# Patient Record
Sex: MALE | Race: WHITE | NOT HISPANIC OR LATINO | URBAN - NONMETROPOLITAN AREA
[De-identification: names, ages, dates, MRNs, and addresses within clinical notes are randomized per-mention and may not be internally consistent; named-entity substitution may affect disease eponyms.]

---

## 2019-11-15 ENCOUNTER — APPOINTMENT (RX ONLY)
Dept: URBAN - NONMETROPOLITAN AREA CLINIC 31 | Facility: CLINIC | Age: 74
Setting detail: DERMATOLOGY
End: 2019-11-15

## 2019-11-15 DIAGNOSIS — I87.2 VENOUS INSUFFICIENCY (CHRONIC) (PERIPHERAL): ICD-10-CM

## 2019-11-15 DIAGNOSIS — L57.0 ACTINIC KERATOSIS: ICD-10-CM

## 2019-11-15 DIAGNOSIS — I78.8 OTHER DISEASES OF CAPILLARIES: ICD-10-CM

## 2019-11-15 PROBLEM — J30.1 ALLERGIC RHINITIS DUE TO POLLEN: Status: ACTIVE | Noted: 2019-11-15

## 2019-11-15 PROBLEM — M12.9 ARTHROPATHY, UNSPECIFIED: Status: ACTIVE | Noted: 2019-11-15

## 2019-11-15 PROBLEM — I10 ESSENTIAL (PRIMARY) HYPERTENSION: Status: ACTIVE | Noted: 2019-11-15

## 2019-11-15 PROBLEM — H91.90 UNSPECIFIED HEARING LOSS, UNSPECIFIED EAR: Status: ACTIVE | Noted: 2019-11-15

## 2019-11-15 PROBLEM — J45.909 UNSPECIFIED ASTHMA, UNCOMPLICATED: Status: ACTIVE | Noted: 2019-11-15

## 2019-11-15 PROCEDURE — 17000 DESTRUCT PREMALG LESION: CPT

## 2019-11-15 PROCEDURE — 17003 DESTRUCT PREMALG LES 2-14: CPT

## 2019-11-15 PROCEDURE — ? LIQUID NITROGEN

## 2019-11-15 PROCEDURE — ? COUNSELING

## 2019-11-15 PROCEDURE — ? PATIENT SPECIFIC COUNSELING

## 2019-11-15 PROCEDURE — ? SUNSCREEN RECOMMENDATIONS

## 2019-11-15 PROCEDURE — 99203 OFFICE O/P NEW LOW 30 MIN: CPT | Mod: 25

## 2019-11-15 ASSESSMENT — LOCATION DETAILED DESCRIPTION DERM
LOCATION DETAILED: LEFT CENTRAL FRONTAL SCALP
LOCATION DETAILED: LEFT PROXIMAL PRETIBIAL REGION
LOCATION DETAILED: NASAL SUPRATIP
LOCATION DETAILED: RIGHT PROXIMAL PRETIBIAL REGION
LOCATION DETAILED: RIGHT FOREHEAD
LOCATION DETAILED: LEFT MEDIAL MALAR CHEEK
LOCATION DETAILED: NASAL TIP

## 2019-11-15 ASSESSMENT — LOCATION SIMPLE DESCRIPTION DERM
LOCATION SIMPLE: RIGHT PRETIBIAL REGION
LOCATION SIMPLE: RIGHT FOREHEAD
LOCATION SIMPLE: LEFT CHEEK
LOCATION SIMPLE: LEFT SCALP
LOCATION SIMPLE: LEFT PRETIBIAL REGION
LOCATION SIMPLE: NOSE

## 2019-11-15 ASSESSMENT — LOCATION ZONE DERM
LOCATION ZONE: LEG
LOCATION ZONE: SCALP
LOCATION ZONE: NOSE
LOCATION ZONE: FACE

## 2019-11-15 NOTE — PROCEDURE: MIPS QUALITY
Quality 130: Documentation Of Current Medications In The Medical Record: Current Medications Documented
Detail Level: Generalized
Quality 111:Pneumonia Vaccination Status For Older Adults: Pneumococcal Vaccination not Administered or Previously Received, Reason not Otherwise Specified
Quality 265: Biopsy Follow-Up: Biopsy results reviewed, communicated, tracked, and documented
Quality 226: Preventive Care And Screening: Tobacco Use: Screening And Cessation Intervention: Patient screened for tobacco use and is an ex/non-smoker
Quality 110: Preventive Care And Screening: Influenza Immunization: Influenza immunization was not ordered or administered, reason not given

## 2019-11-15 NOTE — HPI: SKIN LESIONS
How Severe Is Your Skin Lesion?: mild
treated_been_treated
Is This A New Presentation, Or A Follow-Up?: Skin Lesions
Additional History: He received PDT. Received a fungal shampoo to treat face. Has a lesion on the leg.

## 2019-11-15 NOTE — PROCEDURE: LIQUID NITROGEN
Number Of Freeze-Thaw Cycles: 1 freeze-thaw cycle
Detail Level: Detailed
Duration Of Freeze Thaw-Cycle (Seconds): 2
Post-Care Instructions: I reviewed with the patient in detail post-care instructions. Patient is to wear sunprotection, and avoid picking at any of the treated lesions. Pt may apply Vaseline to crusted or scabbing areas.
Render Post-Care Instructions In Note?: yes
Consent: The patient's consent was obtained including but not limited to risks of crusting, scabbing, blistering, scarring, darker or lighter pigmentary change, recurrence, incomplete removal and infection.

## 2019-11-15 NOTE — PROCEDURE: PATIENT SPECIFIC COUNSELING
Advised patient to wear compression socks everyday\\nprescription sent for triamcinolone 0.1% topical cream TP, apply twice daily to rash up to 2 weeks/month as needed. Avoid the face. (3refills)
Detail Level: Detailed

## 2019-12-11 VITALS — SYSTOLIC BLOOD PRESSURE: 154 MMHG | DIASTOLIC BLOOD PRESSURE: 86 MMHG

## 2020-07-21 ENCOUNTER — APPOINTMENT (RX ONLY)
Dept: URBAN - NONMETROPOLITAN AREA CLINIC 31 | Facility: CLINIC | Age: 75
Setting detail: DERMATOLOGY
End: 2020-07-21

## 2020-07-21 VITALS — TEMPERATURE: 98.9 F

## 2020-07-21 DIAGNOSIS — L21.8 OTHER SEBORRHEIC DERMATITIS: ICD-10-CM

## 2020-07-21 DIAGNOSIS — L57.0 ACTINIC KERATOSIS: ICD-10-CM

## 2020-07-21 DIAGNOSIS — L259 CONTACT DERMATITIS AND OTHER ECZEMA, UNSPECIFIED CAUSE: ICD-10-CM | Status: INADEQUATELY CONTROLLED

## 2020-07-21 PROBLEM — M12.9 ARTHROPATHY, UNSPECIFIED: Status: ACTIVE | Noted: 2020-07-21

## 2020-07-21 PROBLEM — L30.8 OTHER SPECIFIED DERMATITIS: Status: ACTIVE | Noted: 2020-07-21

## 2020-07-21 PROBLEM — J45.909 UNSPECIFIED ASTHMA, UNCOMPLICATED: Status: ACTIVE | Noted: 2020-07-21

## 2020-07-21 PROCEDURE — ? LIQUID NITROGEN

## 2020-07-21 PROCEDURE — ? COUNSELING

## 2020-07-21 PROCEDURE — ? PRESCRIPTION

## 2020-07-21 PROCEDURE — ? PATIENT SPECIFIC COUNSELING

## 2020-07-21 PROCEDURE — 17000 DESTRUCT PREMALG LESION: CPT

## 2020-07-21 PROCEDURE — 17003 DESTRUCT PREMALG LES 2-14: CPT

## 2020-07-21 PROCEDURE — 99214 OFFICE O/P EST MOD 30 MIN: CPT | Mod: 25

## 2020-07-21 RX ORDER — FLUOROURACIL 5 MG/G
CREAM TOPICAL
Qty: 1 | Refills: 0 | Status: ERX | COMMUNITY
Start: 2020-07-21

## 2020-07-21 RX ORDER — MUPIROCIN 20 MG/G
OINTMENT TOPICAL
Qty: 1 | Refills: 0 | Status: ERX | COMMUNITY
Start: 2020-07-21

## 2020-07-21 RX ADMIN — MUPIROCIN: 20 OINTMENT TOPICAL at 00:00

## 2020-07-21 RX ADMIN — FLUOROURACIL: 5 CREAM TOPICAL at 00:00

## 2020-07-21 ASSESSMENT — LOCATION DETAILED DESCRIPTION DERM
LOCATION DETAILED: RIGHT PROXIMAL CALF
LOCATION DETAILED: RIGHT INFERIOR FOREHEAD
LOCATION DETAILED: LEFT CENTRAL PARIETAL SCALP
LOCATION DETAILED: RIGHT PROXIMAL PRETIBIAL REGION
LOCATION DETAILED: LEFT PROXIMAL PRETIBIAL REGION
LOCATION DETAILED: RIGHT CENTRAL TEMPLE
LOCATION DETAILED: RIGHT FOREHEAD
LOCATION DETAILED: LEFT PROXIMAL DORSAL FOREARM
LOCATION DETAILED: RIGHT SUPERIOR FOREHEAD
LOCATION DETAILED: LEFT SUPERIOR FOREHEAD
LOCATION DETAILED: LEFT PROXIMAL CALF
LOCATION DETAILED: LEFT CENTRAL FRONTAL SCALP
LOCATION DETAILED: RIGHT PROXIMAL DORSAL FOREARM

## 2020-07-21 ASSESSMENT — LOCATION SIMPLE DESCRIPTION DERM
LOCATION SIMPLE: LEFT SCALP
LOCATION SIMPLE: RIGHT TEMPLE
LOCATION SIMPLE: LEFT PRETIBIAL REGION
LOCATION SIMPLE: LEFT FOREHEAD
LOCATION SIMPLE: LEFT CALF
LOCATION SIMPLE: RIGHT CALF
LOCATION SIMPLE: LEFT FOREARM
LOCATION SIMPLE: RIGHT PRETIBIAL REGION
LOCATION SIMPLE: SCALP
LOCATION SIMPLE: RIGHT FOREHEAD
LOCATION SIMPLE: RIGHT FOREARM

## 2020-07-21 ASSESSMENT — LOCATION ZONE DERM
LOCATION ZONE: LEG
LOCATION ZONE: FACE
LOCATION ZONE: SCALP
LOCATION ZONE: ARM

## 2020-07-21 ASSESSMENT — SEVERITY ASSESSMENT: HOW SEVERE IS THIS PATIENT'S CONDITION?: ALMOST CLEAR

## 2020-07-21 NOTE — HPI: EVALUATION OF SKIN LESION(S)
What Type Of Note Output Would You Prefer (Optional)?: Standard Output
Hpi Title: Evaluation of Skin Lesions
Have Your Spot(S) Been Treated In The Past?: has not been treated
Additional History: Has tolerated levulan and [place]ac, and LN2, No history HSV

## 2020-07-21 NOTE — HPI: RASH
What Type Of Note Output Would You Prefer (Optional)?: Standard Output
How Severe Is Your Rash?: moderate
Is This A New Presentation, Or A Follow-Up?: Rash
Additional History: Maybe bite behind left knee, swollen.\\n\\nUsing triamcinalone after showers and AM.

## 2020-07-21 NOTE — PROCEDURE: LIQUID NITROGEN
Number Of Freeze-Thaw Cycles: 1 freeze-thaw cycle
Duration Of Freeze Thaw-Cycle (Seconds): 3
Detail Level: Detailed
Render Note In Bullet Format When Appropriate: No
Consent: The patient's consent was obtained including but not limited to risks of crusting, scabbing, blistering, scarring, darker or lighter pigmentary change, recurrence, incomplete removal and infection.
Post-Care Instructions: I reviewed with the patient in detail post-care instructions. Patient is to wear sunprotection, and avoid picking at any of the treated lesions. Pt may apply Vaseline to crusted or scabbing areas.

## 2020-07-21 NOTE — PROCEDURE: MIPS QUALITY
Quality 111:Pneumonia Vaccination Status For Older Adults: Pneumococcal Vaccination not Administered or Previously Received, Reason not Otherwise Specified
Quality 130: Documentation Of Current Medications In The Medical Record: Current Medications Documented
Quality 226: Preventive Care And Screening: Tobacco Use: Screening And Cessation Intervention: Patient screened for tobacco use and is an ex/non-smoker
Detail Level: Generalized
Quality 265: Biopsy Follow-Up: Biopsy results reviewed, communicated, tracked, and documented
Quality 110: Preventive Care And Screening: Influenza Immunization: Influenza immunization was not ordered or administered, reason not given

## 2021-07-01 ENCOUNTER — APPOINTMENT (RX ONLY)
Dept: URBAN - NONMETROPOLITAN AREA CLINIC 31 | Facility: CLINIC | Age: 76
Setting detail: DERMATOLOGY
End: 2021-07-01

## 2021-07-01 VITALS — TEMPERATURE: 98 F

## 2021-07-01 DIAGNOSIS — L259 CONTACT DERMATITIS AND OTHER ECZEMA, UNSPECIFIED CAUSE: ICD-10-CM

## 2021-07-01 DIAGNOSIS — L21.8 OTHER SEBORRHEIC DERMATITIS: ICD-10-CM

## 2021-07-01 DIAGNOSIS — L57.0 ACTINIC KERATOSIS: ICD-10-CM

## 2021-07-01 PROBLEM — M12.9 ARTHROPATHY, UNSPECIFIED: Status: ACTIVE | Noted: 2021-07-01

## 2021-07-01 PROBLEM — L30.8 OTHER SPECIFIED DERMATITIS: Status: ACTIVE | Noted: 2021-07-01

## 2021-07-01 PROBLEM — J45.909 UNSPECIFIED ASTHMA, UNCOMPLICATED: Status: ACTIVE | Noted: 2021-07-01

## 2021-07-01 PROBLEM — J30.1 ALLERGIC RHINITIS DUE TO POLLEN: Status: ACTIVE | Noted: 2021-07-01

## 2021-07-01 PROBLEM — H91.90 UNSPECIFIED HEARING LOSS, UNSPECIFIED EAR: Status: ACTIVE | Noted: 2021-07-01

## 2021-07-01 PROCEDURE — 17003 DESTRUCT PREMALG LES 2-14: CPT

## 2021-07-01 PROCEDURE — ? LIQUID NITROGEN

## 2021-07-01 PROCEDURE — ? COUNSELING

## 2021-07-01 PROCEDURE — ? PRESCRIPTION MEDICATION MANAGEMENT

## 2021-07-01 PROCEDURE — ? PRESCRIPTION

## 2021-07-01 PROCEDURE — 17000 DESTRUCT PREMALG LESION: CPT

## 2021-07-01 PROCEDURE — 99214 OFFICE O/P EST MOD 30 MIN: CPT | Mod: 25

## 2021-07-01 RX ORDER — CLOBETASOL PROPIONATE 0.5 MG/G
CREAM TOPICAL
Qty: 1 | Refills: 2 | Status: ERX | COMMUNITY
Start: 2021-07-01

## 2021-07-01 RX ADMIN — CLOBETASOL PROPIONATE: 0.5 CREAM TOPICAL at 00:00

## 2021-07-01 ASSESSMENT — LOCATION SIMPLE DESCRIPTION DERM
LOCATION SIMPLE: LEFT LIP
LOCATION SIMPLE: LEFT CALF
LOCATION SIMPLE: LEFT CHEEK
LOCATION SIMPLE: LEFT FOREARM
LOCATION SIMPLE: LEFT PRETIBIAL REGION
LOCATION SIMPLE: LEFT ZYGOMA
LOCATION SIMPLE: RIGHT EAR
LOCATION SIMPLE: RIGHT PRETIBIAL REGION
LOCATION SIMPLE: RIGHT CHEEK
LOCATION SIMPLE: RIGHT FOREARM
LOCATION SIMPLE: RIGHT CALF
LOCATION SIMPLE: LEFT FOREHEAD
LOCATION SIMPLE: RIGHT TEMPLE

## 2021-07-01 ASSESSMENT — LOCATION DETAILED DESCRIPTION DERM
LOCATION DETAILED: RIGHT SUPERIOR CENTRAL BUCCAL CHEEK
LOCATION DETAILED: RIGHT PROXIMAL PRETIBIAL REGION
LOCATION DETAILED: LEFT PROXIMAL DORSAL FOREARM
LOCATION DETAILED: RIGHT INFERIOR CRUS OF ANTIHELIX
LOCATION DETAILED: LEFT PROXIMAL PRETIBIAL REGION
LOCATION DETAILED: RIGHT MEDIAL MALAR CHEEK
LOCATION DETAILED: RIGHT PROXIMAL CALF
LOCATION DETAILED: LEFT MEDIAL ZYGOMA
LOCATION DETAILED: RIGHT SUPERIOR LATERAL MALAR CHEEK
LOCATION DETAILED: LEFT LOWER CUTANEOUS LIP
LOCATION DETAILED: LEFT INFERIOR LATERAL FOREHEAD
LOCATION DETAILED: RIGHT CENTRAL TEMPLE
LOCATION DETAILED: LEFT PROXIMAL CALF
LOCATION DETAILED: LEFT SUPERIOR CENTRAL MALAR CHEEK
LOCATION DETAILED: RIGHT PROXIMAL DORSAL FOREARM
LOCATION DETAILED: LEFT SUPERIOR PREAURICULAR CHEEK
LOCATION DETAILED: LEFT CENTRAL MALAR CHEEK
LOCATION DETAILED: RIGHT MEDIAL TEMPLE

## 2021-07-01 ASSESSMENT — LOCATION ZONE DERM
LOCATION ZONE: LIP
LOCATION ZONE: FACE
LOCATION ZONE: LEG
LOCATION ZONE: ARM
LOCATION ZONE: EAR

## 2021-07-01 NOTE — PROCEDURE: PRESCRIPTION MEDICATION MANAGEMENT
Continue Regimen: Triamcinolone cream on face
Detail Level: Zone
Initiate Treatment: clobetasol for new spots
Render In Strict Bullet Format?: No

## 2021-08-17 ENCOUNTER — APPOINTMENT (RX ONLY)
Dept: URBAN - NONMETROPOLITAN AREA CLINIC 31 | Facility: CLINIC | Age: 76
Setting detail: DERMATOLOGY
End: 2021-08-17

## 2021-08-17 DIAGNOSIS — L21.8 OTHER SEBORRHEIC DERMATITIS: ICD-10-CM

## 2021-08-17 PROCEDURE — 99213 OFFICE O/P EST LOW 20 MIN: CPT

## 2021-08-17 PROCEDURE — ? PRESCRIPTION

## 2021-08-17 PROCEDURE — ? COUNSELING

## 2021-08-17 RX ORDER — KETOCONAZOLE 20 MG/G
CREAM TOPICAL
Qty: 1 | Refills: 1 | Status: ERX | COMMUNITY
Start: 2021-08-17

## 2021-08-17 RX ORDER — SULFACETAMIDE SODIUM AND SULFUR 10; 5 MG/G; MG/G
RINSE TOPICAL
Qty: 1 | Refills: 1 | Status: ERX | COMMUNITY
Start: 2021-08-17

## 2021-08-17 RX ADMIN — KETOCONAZOLE: 20 CREAM TOPICAL at 00:00

## 2021-08-17 RX ADMIN — SULFACETAMIDE SODIUM AND SULFUR: 10; 5 RINSE TOPICAL at 00:00

## 2021-08-17 ASSESSMENT — LOCATION ZONE DERM: LOCATION ZONE: LIP

## 2021-08-17 ASSESSMENT — LOCATION DETAILED DESCRIPTION DERM: LOCATION DETAILED: RIGHT UPPER CUTANEOUS LIP

## 2021-08-17 ASSESSMENT — LOCATION SIMPLE DESCRIPTION DERM: LOCATION SIMPLE: RIGHT LIP

## 2022-02-20 ENCOUNTER — HOSPITAL ENCOUNTER (EMERGENCY)
Facility: HOSPITAL | Age: 77
Discharge: HOME | End: 2022-02-20
Attending: EMERGENCY MEDICINE
Payer: MEDICARE

## 2022-02-20 VITALS
WEIGHT: 185 LBS | HEART RATE: 65 BPM | BODY MASS INDEX: 29.03 KG/M2 | OXYGEN SATURATION: 95 % | SYSTOLIC BLOOD PRESSURE: 119 MMHG | DIASTOLIC BLOOD PRESSURE: 81 MMHG | HEIGHT: 67 IN | RESPIRATION RATE: 16 BRPM | TEMPERATURE: 99 F

## 2022-02-20 DIAGNOSIS — U07.1 COVID-19: Primary | ICD-10-CM

## 2022-02-20 PROCEDURE — 99284 EMERGENCY DEPT VISIT MOD MDM: CPT

## 2022-02-20 RX ORDER — BUDESONIDE AND FORMOTEROL FUMARATE DIHYDRATE 160; 4.5 UG/1; UG/1
AEROSOL RESPIRATORY (INHALATION) 2 TIMES DAILY
COMMUNITY

## 2022-02-20 RX ORDER — ESCITALOPRAM OXALATE 10 MG/1
10 TABLET ORAL DAILY
COMMUNITY

## 2022-02-20 RX ORDER — TRIAMCINOLONE ACETONIDE 55 UG/1
SPRAY, METERED NASAL DAILY
COMMUNITY

## 2022-02-20 RX ORDER — MONTELUKAST SODIUM 10 MG/1
10 TABLET ORAL NIGHTLY
COMMUNITY

## 2022-02-20 RX ORDER — ACETAMINOPHEN 160 MG
500 TABLET,DISINTEGRATING ORAL DAILY
COMMUNITY

## 2022-02-20 RX ORDER — CELECOXIB 200 MG/1
200 CAPSULE ORAL 2 TIMES DAILY
COMMUNITY

## 2022-02-20 RX ORDER — LOSARTAN POTASSIUM 50 MG/1
TABLET ORAL DAILY
COMMUNITY

## 2022-02-20 RX ORDER — POLYETHYLENE GLYCOL 3350 17 G/17G
17 POWDER, FOR SOLUTION ORAL DAILY
COMMUNITY

## 2022-02-20 RX ORDER — GABAPENTIN 300 MG/1
300 CAPSULE ORAL 3 TIMES DAILY
COMMUNITY

## 2022-02-20 RX ORDER — MULTIVITAMIN
1 TABLET ORAL DAILY
COMMUNITY

## 2022-02-20 RX ORDER — FEXOFENADINE HCL 180 MG/1
180 TABLET ORAL DAILY
COMMUNITY

## 2022-02-20 RX ORDER — TAMSULOSIN HYDROCHLORIDE 0.4 MG/1
CAPSULE ORAL DAILY
COMMUNITY

## 2022-02-20 RX ORDER — CLOBETASOL PROPIONATE 0.5 MG/G
CREAM TOPICAL 2 TIMES DAILY
COMMUNITY

## 2022-02-20 NOTE — ED INITIAL ASSESSMENT (HPI)
PT PRESENTS TO ED AFTER HE WAS DX WITH COVID 19 TODAY, SYMPTOMS STARTED 2 DAYS AGO, PT STATES HE WAS SENT FOR ANTIBODIES, PT STATES HE DOES NOT HAVE MORE SHORTNESS OF BREATH THAN NORMAL, HX OF COPD, WEARS O2 AT NIGHT. 93% ON RA.  STATES HE HAS SORE THROAT AND HEADACHE.

## 2022-02-21 ENCOUNTER — TELEPHONE (OUTPATIENT)
Dept: CASE MANAGEMENT | Age: 77
End: 2022-02-21

## 2022-05-05 ENCOUNTER — HOSPITAL ENCOUNTER (OUTPATIENT)
Age: 77
Discharge: HOME | End: 2022-05-05
Payer: MEDICARE

## 2022-05-05 VITALS
DIASTOLIC BLOOD PRESSURE: 76 MMHG | WEIGHT: 185 LBS | SYSTOLIC BLOOD PRESSURE: 134 MMHG | HEART RATE: 67 BPM | OXYGEN SATURATION: 96 % | TEMPERATURE: 98 F | RESPIRATION RATE: 18 BRPM | BODY MASS INDEX: 29.73 KG/M2 | HEIGHT: 66 IN

## 2022-05-05 DIAGNOSIS — S61.412A LACERATION OF LEFT HAND WITHOUT FOREIGN BODY, INITIAL ENCOUNTER: Primary | ICD-10-CM

## 2022-05-05 PROCEDURE — 99203 OFFICE O/P NEW LOW 30 MIN: CPT | Performed by: NURSE PRACTITIONER

## 2022-05-05 PROCEDURE — A4570 SPLINT: HCPCS | Performed by: NURSE PRACTITIONER

## 2022-05-05 PROCEDURE — 90715 TDAP VACCINE 7 YRS/> IM: CPT | Performed by: NURSE PRACTITIONER

## 2022-05-05 PROCEDURE — 90471 IMMUNIZATION ADMIN: CPT | Performed by: NURSE PRACTITIONER

## 2022-05-05 RX ORDER — CEPHALEXIN 500 MG/1
500 CAPSULE ORAL 4 TIMES DAILY
Qty: 28 CAPSULE | Refills: 0 | Status: SHIPPED | OUTPATIENT
Start: 2022-05-05 | End: 2022-05-12

## 2022-05-27 ENCOUNTER — HOSPITAL ENCOUNTER (INPATIENT)
Facility: HOSPITAL | Age: 77
LOS: 3 days | Discharge: HOME | End: 2022-05-31
Attending: EMERGENCY MEDICINE | Admitting: INTERNAL MEDICINE
Payer: MEDICARE

## 2022-05-27 ENCOUNTER — APPOINTMENT (OUTPATIENT)
Dept: GENERAL RADIOLOGY | Facility: HOSPITAL | Age: 77
End: 2022-05-27
Attending: HOSPITALIST
Payer: MEDICARE

## 2022-05-27 DIAGNOSIS — I89.1 LYMPHANGITIS: ICD-10-CM

## 2022-05-27 DIAGNOSIS — L08.9: ICD-10-CM

## 2022-05-27 DIAGNOSIS — S61.412A: ICD-10-CM

## 2022-05-27 DIAGNOSIS — L03.114 CELLULITIS OF LEFT UPPER EXTREMITY: Primary | ICD-10-CM

## 2022-05-27 LAB
ALBUMIN SERPL-MCNC: 3.8 G/DL (ref 3.4–5)
ALBUMIN/GLOB SERPL: 1.2 {RATIO} (ref 1–2)
ALP LIVER SERPL-CCNC: 85 U/L
ALT SERPL-CCNC: 22 U/L
ANION GAP SERPL CALC-SCNC: 2 MMOL/L (ref 0–18)
AST SERPL-CCNC: 27 U/L (ref 15–37)
BASOPHILS # BLD AUTO: 0.03 X10(3) UL (ref 0–0.2)
BASOPHILS NFR BLD AUTO: 0.4 %
BILIRUB SERPL-MCNC: 1.2 MG/DL (ref 0.1–2)
BILIRUB UR QL STRIP.AUTO: NEGATIVE
BUN BLD-MCNC: 18 MG/DL (ref 7–18)
CALCIUM BLD-MCNC: 9 MG/DL (ref 8.5–10.1)
CHLORIDE SERPL-SCNC: 108 MMOL/L (ref 98–112)
CLARITY UR REFRACT.AUTO: CLEAR
CO2 SERPL-SCNC: 29 MMOL/L (ref 21–32)
COLOR UR AUTO: YELLOW
CREAT BLD-MCNC: 0.92 MG/DL
EOSINOPHIL # BLD AUTO: 0.07 X10(3) UL (ref 0–0.7)
EOSINOPHIL NFR BLD AUTO: 0.9 %
ERYTHROCYTE [DISTWIDTH] IN BLOOD BY AUTOMATED COUNT: 13.7 %
GLOBULIN PLAS-MCNC: 3.2 G/DL (ref 2.8–4.4)
GLUCOSE BLD-MCNC: 90 MG/DL (ref 70–99)
GLUCOSE UR STRIP.AUTO-MCNC: NEGATIVE MG/DL
HCT VFR BLD AUTO: 39.5 %
HGB BLD-MCNC: 13.6 G/DL
IMM GRANULOCYTES # BLD AUTO: 0.02 X10(3) UL (ref 0–1)
IMM GRANULOCYTES NFR BLD: 0.3 %
KETONES UR STRIP.AUTO-MCNC: NEGATIVE MG/DL
LEUKOCYTE ESTERASE UR QL STRIP.AUTO: NEGATIVE
LYMPHOCYTES # BLD AUTO: 1.93 X10(3) UL (ref 1–4)
LYMPHOCYTES NFR BLD AUTO: 25.3 %
MCH RBC QN AUTO: 35.8 PG (ref 26–34)
MCHC RBC AUTO-ENTMCNC: 34.4 G/DL (ref 31–37)
MCV RBC AUTO: 103.9 FL
MONOCYTES # BLD AUTO: 0.7 X10(3) UL (ref 0.1–1)
MONOCYTES NFR BLD AUTO: 9.2 %
NEUTROPHILS # BLD AUTO: 4.87 X10 (3) UL (ref 1.5–7.7)
NEUTROPHILS # BLD AUTO: 4.87 X10(3) UL (ref 1.5–7.7)
NEUTROPHILS NFR BLD AUTO: 63.9 %
NITRITE UR QL STRIP.AUTO: NEGATIVE
NT-PROBNP SERPL-MCNC: 323 PG/ML (ref ?–450)
OSMOLALITY SERPL CALC.SUM OF ELEC: 289 MOSM/KG (ref 275–295)
PH UR STRIP.AUTO: 5 [PH] (ref 5–8)
PLATELET # BLD AUTO: 148 10(3)UL (ref 150–450)
POTASSIUM SERPL-SCNC: 4.4 MMOL/L (ref 3.5–5.1)
PROT SERPL-MCNC: 7 G/DL (ref 6.4–8.2)
PROT UR STRIP.AUTO-MCNC: NEGATIVE MG/DL
RBC # BLD AUTO: 3.8 X10(6)UL
RBC UR QL AUTO: NEGATIVE
SARS-COV-2 RNA RESP QL NAA+PROBE: NOT DETECTED
SODIUM SERPL-SCNC: 139 MMOL/L (ref 136–145)
SP GR UR STRIP.AUTO: 1.02 (ref 1–1.03)
UROBILINOGEN UR STRIP.AUTO-MCNC: 4 MG/DL
WBC # BLD AUTO: 7.6 X10(3) UL (ref 4–11)

## 2022-05-27 PROCEDURE — 73130 X-RAY EXAM OF HAND: CPT | Performed by: HOSPITALIST

## 2022-05-27 PROCEDURE — 99223 1ST HOSP IP/OBS HIGH 75: CPT | Performed by: HOSPITALIST

## 2022-05-27 RX ORDER — ENOXAPARIN SODIUM 100 MG/ML
40 INJECTION SUBCUTANEOUS DAILY
Status: DISCONTINUED | OUTPATIENT
Start: 2022-05-28 | End: 2022-05-31

## 2022-05-27 RX ORDER — TAMSULOSIN HYDROCHLORIDE 0.4 MG/1
0.4 CAPSULE ORAL DAILY
Status: DISCONTINUED | OUTPATIENT
Start: 2022-05-27 | End: 2022-05-31

## 2022-05-27 RX ORDER — LOSARTAN POTASSIUM 50 MG/1
50 TABLET ORAL DAILY
Status: DISCONTINUED | OUTPATIENT
Start: 2022-05-28 | End: 2022-05-31

## 2022-05-27 RX ORDER — GABAPENTIN 300 MG/1
300 CAPSULE ORAL 3 TIMES DAILY
Status: DISCONTINUED | OUTPATIENT
Start: 2022-05-27 | End: 2022-05-29

## 2022-05-27 RX ORDER — MONTELUKAST SODIUM 10 MG/1
10 TABLET ORAL NIGHTLY
Status: DISCONTINUED | OUTPATIENT
Start: 2022-05-27 | End: 2022-05-31

## 2022-05-27 RX ORDER — ONDANSETRON 2 MG/ML
4 INJECTION INTRAMUSCULAR; INTRAVENOUS EVERY 6 HOURS PRN
Status: DISCONTINUED | OUTPATIENT
Start: 2022-05-27 | End: 2022-05-31

## 2022-05-27 RX ORDER — POLYETHYLENE GLYCOL 3350 17 G/17G
17 POWDER, FOR SOLUTION ORAL DAILY PRN
Status: DISCONTINUED | OUTPATIENT
Start: 2022-05-27 | End: 2022-05-31

## 2022-05-27 RX ORDER — MELATONIN
3 NIGHTLY PRN
Status: DISCONTINUED | OUTPATIENT
Start: 2022-05-27 | End: 2022-05-31

## 2022-05-27 RX ORDER — METOCLOPRAMIDE HYDROCHLORIDE 5 MG/ML
10 INJECTION INTRAMUSCULAR; INTRAVENOUS EVERY 8 HOURS PRN
Status: DISCONTINUED | OUTPATIENT
Start: 2022-05-27 | End: 2022-05-31

## 2022-05-27 RX ORDER — CELECOXIB 200 MG/1
200 CAPSULE ORAL 2 TIMES DAILY
Status: DISCONTINUED | OUTPATIENT
Start: 2022-05-27 | End: 2022-05-31

## 2022-05-27 RX ORDER — SENNOSIDES 8.6 MG
17.2 TABLET ORAL NIGHTLY PRN
Status: DISCONTINUED | OUTPATIENT
Start: 2022-05-27 | End: 2022-05-31

## 2022-05-27 RX ORDER — POLYETHYLENE GLYCOL 3350 17 G/17G
17 POWDER, FOR SOLUTION ORAL DAILY
Status: DISCONTINUED | OUTPATIENT
Start: 2022-05-27 | End: 2022-05-31

## 2022-05-27 RX ORDER — BISACODYL 10 MG
10 SUPPOSITORY, RECTAL RECTAL
Status: DISCONTINUED | OUTPATIENT
Start: 2022-05-27 | End: 2022-05-31

## 2022-05-27 RX ORDER — CETIRIZINE HYDROCHLORIDE 10 MG/1
10 TABLET ORAL DAILY
Status: DISCONTINUED | OUTPATIENT
Start: 2022-05-27 | End: 2022-05-31

## 2022-05-27 RX ORDER — CLINDAMYCIN PHOSPHATE 600 MG/50ML
600 INJECTION INTRAVENOUS EVERY 8 HOURS
Status: DISCONTINUED | OUTPATIENT
Start: 2022-05-28 | End: 2022-05-31

## 2022-05-27 RX ORDER — ESCITALOPRAM OXALATE 10 MG/1
10 TABLET ORAL DAILY
Status: DISCONTINUED | OUTPATIENT
Start: 2022-05-27 | End: 2022-05-31

## 2022-05-27 RX ORDER — ACETAMINOPHEN 325 MG/1
650 TABLET ORAL EVERY 6 HOURS PRN
Status: DISCONTINUED | OUTPATIENT
Start: 2022-05-27 | End: 2022-05-31

## 2022-05-27 RX ORDER — SODIUM PHOSPHATE, DIBASIC AND SODIUM PHOSPHATE, MONOBASIC 7; 19 G/133ML; G/133ML
1 ENEMA RECTAL ONCE AS NEEDED
Status: DISCONTINUED | OUTPATIENT
Start: 2022-05-27 | End: 2022-05-31

## 2022-05-27 RX ORDER — SODIUM CHLORIDE 9 MG/ML
INJECTION, SOLUTION INTRAVENOUS CONTINUOUS
Status: DISCONTINUED | OUTPATIENT
Start: 2022-05-27 | End: 2022-05-28

## 2022-05-27 NOTE — ED INITIAL ASSESSMENT (HPI)
Pt c/o of redness/swelling to his left hand. Pt had a laceration to the site 10 days ago and had steri-strips placed and prescribed abx. Pt's son also states that pt having elevated BP readings at home and swelling to bilateral lower extremities.

## 2022-05-28 LAB
ALBUMIN SERPL-MCNC: 3.5 G/DL (ref 3.4–5)
ALBUMIN/GLOB SERPL: 1.1 {RATIO} (ref 1–2)
ALP LIVER SERPL-CCNC: 83 U/L
ALT SERPL-CCNC: 19 U/L
ANION GAP SERPL CALC-SCNC: 4 MMOL/L (ref 0–18)
AST SERPL-CCNC: 24 U/L (ref 15–37)
ATRIAL RATE: 66 BPM
BASOPHILS # BLD AUTO: 0.03 X10(3) UL (ref 0–0.2)
BASOPHILS NFR BLD AUTO: 0.5 %
BILIRUB SERPL-MCNC: 1.6 MG/DL (ref 0.1–2)
BUN BLD-MCNC: 14 MG/DL (ref 7–18)
CALCIUM BLD-MCNC: 8.7 MG/DL (ref 8.5–10.1)
CHLORIDE SERPL-SCNC: 108 MMOL/L (ref 98–112)
CO2 SERPL-SCNC: 28 MMOL/L (ref 21–32)
CREAT BLD-MCNC: 0.75 MG/DL
CRP SERPL-MCNC: <0.29 MG/DL (ref ?–0.3)
EOSINOPHIL # BLD AUTO: 0.05 X10(3) UL (ref 0–0.7)
EOSINOPHIL NFR BLD AUTO: 0.9 %
ERYTHROCYTE [DISTWIDTH] IN BLOOD BY AUTOMATED COUNT: 13.3 %
ERYTHROCYTE [SEDIMENTATION RATE] IN BLOOD: 14 MM/HR
GLOBULIN PLAS-MCNC: 3.1 G/DL (ref 2.8–4.4)
GLUCOSE BLD-MCNC: 100 MG/DL (ref 70–99)
GLUCOSE BLD-MCNC: 81 MG/DL (ref 70–99)
HCT VFR BLD AUTO: 38.9 %
HGB BLD-MCNC: 13.2 G/DL
IMM GRANULOCYTES # BLD AUTO: 0.02 X10(3) UL (ref 0–1)
IMM GRANULOCYTES NFR BLD: 0.4 %
LYMPHOCYTES # BLD AUTO: 1.48 X10(3) UL (ref 1–4)
LYMPHOCYTES NFR BLD AUTO: 26.4 %
MCH RBC QN AUTO: 35.3 PG (ref 26–34)
MCHC RBC AUTO-ENTMCNC: 33.9 G/DL (ref 31–37)
MCV RBC AUTO: 104 FL
MONOCYTES # BLD AUTO: 0.47 X10(3) UL (ref 0.1–1)
MONOCYTES NFR BLD AUTO: 8.4 %
NEUTROPHILS # BLD AUTO: 3.55 X10 (3) UL (ref 1.5–7.7)
NEUTROPHILS # BLD AUTO: 3.55 X10(3) UL (ref 1.5–7.7)
NEUTROPHILS NFR BLD AUTO: 63.4 %
NT-PROBNP SERPL-MCNC: 680 PG/ML (ref ?–450)
OSMOLALITY SERPL CALC.SUM OF ELEC: 291 MOSM/KG (ref 275–295)
P AXIS: 19 DEGREES
P-R INTERVAL: 136 MS
PLATELET # BLD AUTO: 140 10(3)UL (ref 150–450)
POTASSIUM SERPL-SCNC: 3.8 MMOL/L (ref 3.5–5.1)
PROT SERPL-MCNC: 6.6 G/DL (ref 6.4–8.2)
Q-T INTERVAL: 408 MS
QRS DURATION: 78 MS
QTC CALCULATION (BEZET): 427 MS
R AXIS: 46 DEGREES
RBC # BLD AUTO: 3.74 X10(6)UL
SODIUM SERPL-SCNC: 140 MMOL/L (ref 136–145)
T AXIS: 29 DEGREES
VENTRICULAR RATE: 66 BPM
WBC # BLD AUTO: 5.6 X10(3) UL (ref 4–11)

## 2022-05-28 PROCEDURE — 5A09357 ASSISTANCE WITH RESPIRATORY VENTILATION, LESS THAN 24 CONSECUTIVE HOURS, CONTINUOUS POSITIVE AIRWAY PRESSURE: ICD-10-PCS | Performed by: STUDENT IN AN ORGANIZED HEALTH CARE EDUCATION/TRAINING PROGRAM

## 2022-05-28 PROCEDURE — 99232 SBSQ HOSP IP/OBS MODERATE 35: CPT | Performed by: STUDENT IN AN ORGANIZED HEALTH CARE EDUCATION/TRAINING PROGRAM

## 2022-05-28 NOTE — PROGRESS NOTES
D: new admit from ED, pt ambulated from stretcher to bed w/ standby assist. A+ox4, pt unsure of whether or not he takes certain medications at home. Pt denies pain to left hand at this time. Pt encouraged to keep hand elevated, pillows provided for support. Admission navigator completed, med rec reviewed with patient. Pt has hx of MAL, cpap at hs. IV abx and fluids infusing at this time. Will cont to monitor.

## 2022-05-28 NOTE — PLAN OF CARE
Patient is alert and oriented x 4. On room air. SL. Voiding freely. Wound to posterior left hand covered with gauze dressing - clean, dry, intact. Han,ple for cultures obtained prior to putting dressing on. Patient denies pain. SCDs. IS and ankle pumps encouraged, call light within reach and encouraged to call for assistance. All safety precautions and alarms in place.

## 2022-05-28 NOTE — ED QUICK NOTES
Orders for admission, patient is aware of plan and ready to go upstairs. Any questions, please call ED RN Georgina Davis at extension [phone].      Patient Covid vaccination status: Fully vaccinated     COVID Test Ordered in ED: Rapid SARS-CoV-2 by PCR    COVID Suspicion at Admission: Low clinical suspicion for COVID    Running Infusions:  None    Mental Status/LOC at time of transport: AAOx4    Other pertinent information:   CIWA score: N/A   NIH score:  N/A

## 2022-05-28 NOTE — ED QUICK NOTES
Patient resting comfortably on the cot, vitals stable, respirations easy and unlabored, no distress noted. Son at bedside. Informed that we are waiting for a room assignment. Will continue to monitor. Patient son concerned about home medications. This RN reviewed medications on file with patients med list he has on his phone. Informed that patient will receive his home meds when he is here. Son also concerned about CPAP machine and oxygen. Informed that the physician can order a CPAP machine for him and Respiratory Therapy can bring it to him.

## 2022-05-29 ENCOUNTER — APPOINTMENT (OUTPATIENT)
Dept: ULTRASOUND IMAGING | Facility: HOSPITAL | Age: 77
End: 2022-05-29
Attending: STUDENT IN AN ORGANIZED HEALTH CARE EDUCATION/TRAINING PROGRAM
Payer: MEDICARE

## 2022-05-29 PROCEDURE — 76882 US LMTD JT/FCL EVL NVASC XTR: CPT | Performed by: STUDENT IN AN ORGANIZED HEALTH CARE EDUCATION/TRAINING PROGRAM

## 2022-05-29 PROCEDURE — 99233 SBSQ HOSP IP/OBS HIGH 50: CPT | Performed by: STUDENT IN AN ORGANIZED HEALTH CARE EDUCATION/TRAINING PROGRAM

## 2022-05-29 RX ORDER — GABAPENTIN 300 MG/1
900 CAPSULE ORAL 3 TIMES DAILY
Status: DISCONTINUED | OUTPATIENT
Start: 2022-05-29 | End: 2022-05-31

## 2022-05-29 NOTE — PLAN OF CARE
Patient is alert and oriented x 4. On room air. SL. Voiding freely. Wound to posterior left hand covered with gauze dressing - clean, dry, intact. Patient denies pain. SCDs. IS and ankle pumps encouraged, call light within reach and encouraged to call for assistance. All safety precautions and alarms in place. US of hand prior to discontinue to determine whether there is an abscess forming.

## 2022-05-29 NOTE — PLAN OF CARE
Pt resting comfortably in bed at this time. Vss at this time, refusing to use cpap tonight,  & tele in place. Pt refusing scds tonight also. Gauze clean dry and intact changed by day rn. Iv abx given see mar. Vss at this time. Pt verbalized understanding of poc & call don't fall protocol. Plan home when ready.

## 2022-05-30 ENCOUNTER — ANESTHESIA (OUTPATIENT)
Dept: SURGERY | Facility: HOSPITAL | Age: 77
End: 2022-05-30
Payer: MEDICARE

## 2022-05-30 ENCOUNTER — ANESTHESIA EVENT (OUTPATIENT)
Dept: SURGERY | Facility: HOSPITAL | Age: 77
End: 2022-05-30
Payer: MEDICARE

## 2022-05-30 PROCEDURE — 0LQ80ZZ REPAIR LEFT HAND TENDON, OPEN APPROACH: ICD-10-PCS | Performed by: ORTHOPAEDIC SURGERY

## 2022-05-30 PROCEDURE — 99232 SBSQ HOSP IP/OBS MODERATE 35: CPT | Performed by: STUDENT IN AN ORGANIZED HEALTH CARE EDUCATION/TRAINING PROGRAM

## 2022-05-30 PROCEDURE — 0RBV0ZZ EXCISION OF LEFT METACARPOPHALANGEAL JOINT, OPEN APPROACH: ICD-10-PCS | Performed by: ORTHOPAEDIC SURGERY

## 2022-05-30 RX ORDER — KETOROLAC TROMETHAMINE 30 MG/ML
30 INJECTION, SOLUTION INTRAMUSCULAR; INTRAVENOUS EVERY 6 HOURS PRN
Status: DISCONTINUED | OUTPATIENT
Start: 2022-05-30 | End: 2022-05-30

## 2022-05-30 RX ORDER — CEFAZOLIN SODIUM 1 G/3ML
INJECTION, POWDER, FOR SOLUTION INTRAMUSCULAR; INTRAVENOUS AS NEEDED
Status: DISCONTINUED | OUTPATIENT
Start: 2022-05-30 | End: 2022-05-30 | Stop reason: SURG

## 2022-05-30 RX ORDER — HYDROMORPHONE HYDROCHLORIDE 1 MG/ML
0.6 INJECTION, SOLUTION INTRAMUSCULAR; INTRAVENOUS; SUBCUTANEOUS EVERY 5 MIN PRN
Status: DISCONTINUED | OUTPATIENT
Start: 2022-05-30 | End: 2022-05-30 | Stop reason: HOSPADM

## 2022-05-30 RX ORDER — MIDAZOLAM HYDROCHLORIDE 1 MG/ML
1 INJECTION INTRAMUSCULAR; INTRAVENOUS EVERY 5 MIN PRN
Status: DISCONTINUED | OUTPATIENT
Start: 2022-05-30 | End: 2022-05-30 | Stop reason: HOSPADM

## 2022-05-30 RX ORDER — IPRATROPIUM BROMIDE AND ALBUTEROL SULFATE 2.5; .5 MG/3ML; MG/3ML
SOLUTION RESPIRATORY (INHALATION)
Status: DISCONTINUED
Start: 2022-05-30 | End: 2022-05-30 | Stop reason: WASHOUT

## 2022-05-30 RX ORDER — KETOROLAC TROMETHAMINE 30 MG/ML
30 INJECTION, SOLUTION INTRAMUSCULAR; INTRAVENOUS EVERY 6 HOURS PRN
Status: DISCONTINUED | OUTPATIENT
Start: 2022-05-30 | End: 2022-05-31

## 2022-05-30 RX ORDER — DEXAMETHASONE SODIUM PHOSPHATE 4 MG/ML
VIAL (ML) INJECTION AS NEEDED
Status: DISCONTINUED | OUTPATIENT
Start: 2022-05-30 | End: 2022-05-30 | Stop reason: SURG

## 2022-05-30 RX ORDER — HYDROMORPHONE HYDROCHLORIDE 1 MG/ML
INJECTION, SOLUTION INTRAMUSCULAR; INTRAVENOUS; SUBCUTANEOUS
Status: COMPLETED
Start: 2022-05-30 | End: 2022-05-30

## 2022-05-30 RX ORDER — SODIUM CHLORIDE, SODIUM LACTATE, POTASSIUM CHLORIDE, CALCIUM CHLORIDE 600; 310; 30; 20 MG/100ML; MG/100ML; MG/100ML; MG/100ML
INJECTION, SOLUTION INTRAVENOUS CONTINUOUS
Status: DISCONTINUED | OUTPATIENT
Start: 2022-05-30 | End: 2022-05-30 | Stop reason: HOSPADM

## 2022-05-30 RX ORDER — HYDROMORPHONE HYDROCHLORIDE 1 MG/ML
0.4 INJECTION, SOLUTION INTRAMUSCULAR; INTRAVENOUS; SUBCUTANEOUS EVERY 5 MIN PRN
Status: DISCONTINUED | OUTPATIENT
Start: 2022-05-30 | End: 2022-05-30 | Stop reason: HOSPADM

## 2022-05-30 RX ORDER — NALOXONE HYDROCHLORIDE 0.4 MG/ML
80 INJECTION, SOLUTION INTRAMUSCULAR; INTRAVENOUS; SUBCUTANEOUS AS NEEDED
Status: DISCONTINUED | OUTPATIENT
Start: 2022-05-30 | End: 2022-05-30 | Stop reason: HOSPADM

## 2022-05-30 RX ORDER — ONDANSETRON 2 MG/ML
4 INJECTION INTRAMUSCULAR; INTRAVENOUS EVERY 6 HOURS PRN
Status: DISCONTINUED | OUTPATIENT
Start: 2022-05-30 | End: 2022-05-30 | Stop reason: HOSPADM

## 2022-05-30 RX ORDER — HYDROMORPHONE HYDROCHLORIDE 1 MG/ML
0.2 INJECTION, SOLUTION INTRAMUSCULAR; INTRAVENOUS; SUBCUTANEOUS EVERY 5 MIN PRN
Status: DISCONTINUED | OUTPATIENT
Start: 2022-05-30 | End: 2022-05-30 | Stop reason: HOSPADM

## 2022-05-30 RX ORDER — METOCLOPRAMIDE HYDROCHLORIDE 5 MG/ML
10 INJECTION INTRAMUSCULAR; INTRAVENOUS EVERY 8 HOURS PRN
Status: DISCONTINUED | OUTPATIENT
Start: 2022-05-30 | End: 2022-05-30 | Stop reason: HOSPADM

## 2022-05-30 RX ORDER — ONDANSETRON 2 MG/ML
INJECTION INTRAMUSCULAR; INTRAVENOUS AS NEEDED
Status: DISCONTINUED | OUTPATIENT
Start: 2022-05-30 | End: 2022-05-30 | Stop reason: SURG

## 2022-05-30 RX ORDER — LIDOCAINE HYDROCHLORIDE 10 MG/ML
INJECTION, SOLUTION EPIDURAL; INFILTRATION; INTRACAUDAL; PERINEURAL AS NEEDED
Status: DISCONTINUED | OUTPATIENT
Start: 2022-05-30 | End: 2022-05-30 | Stop reason: HOSPADM

## 2022-05-30 RX ADMIN — DEXAMETHASONE SODIUM PHOSPHATE 4 MG: 4 MG/ML VIAL (ML) INJECTION at 10:47:00

## 2022-05-30 RX ADMIN — CEFAZOLIN SODIUM 2 G: 1 INJECTION, POWDER, FOR SOLUTION INTRAMUSCULAR; INTRAVENOUS at 10:44:00

## 2022-05-30 RX ADMIN — ONDANSETRON 4 MG: 2 INJECTION INTRAMUSCULAR; INTRAVENOUS at 10:47:00

## 2022-05-30 NOTE — ANESTHESIA POSTPROCEDURE EVALUATION
[address] Patient Status:  Inpatient   Age/Gender 76year old male MRN [id number]   Location [address] [facility] Attending Yudy Whatley MD   [facility] Day # 2 PCP None Pcp       Anesthesia Post-op Note     IRRIGATION & DEBRIDEMENT LEFT HAND    Procedure Summary     Date: 05/30/22 Room / Location: [facility] MAIN OR 08 / [facility] MAIN OR    Anesthesia Start: 1031 Anesthesia Stop: 1137    Procedure: IRRIGATION & DEBRIDEMENT LEFT HAND (Left Hand) Diagnosis:       Laceration of left hand with infection      (Laceration of left hand with infection [S11.349P, L08.9])    Surgeons: Sahara Ayala MD Anesthesiologist: Chacorta Patterson MD    Anesthesia Type: general ASA Status: 3          Anesthesia Type: general    Vitals Value Taken Time   BP See epic 05/30/22 1140   Temp 98.0 05/30/22 1140   Pulse 59 05/30/22 1140   Resp 14 05/30/22 1140   SpO2 98 05/30/22 1140       Patient Location: PACU    Anesthesia Type: general    Airway Patency: patent    Postop Pain Control: adequate    Mental Status: mildly sedated but able to meaningfully participate in the post-anesthesia evaluation    Nausea/Vomiting: none    Cardiopulmonary/Hydration status: stable euvolemic    Dental Exam: Unchanged from Preop    Patient to be discharged from PACU when criteria met.

## 2022-05-30 NOTE — PLAN OF CARE
Pt AOx4, VSS on RA hx of MAL refused CPAP, , and ankle pumps encouraged. On tele-NSR. Gauze and tape dressing to L hand with scant drain. Denies pain, sob, chest pain, n/v. Last BM 05/29, voids in the bathroom up adlib. Reminded to \"call, don't fall\", call light placed within reach, safety precaution in place. POC discussed with patient. Plan: NPO, possible I and D 05/30  Will continue to monitor.

## 2022-05-30 NOTE — BRIEF OP NOTE
Pre-Operative Diagnosis: Laceration of left hand with infection [S61.412A, L08.9]     Post-Operative Diagnosis: Laceration of left hand with infection [H16.192M, L08.9]      Procedure Performed:    IRRIGATION & DEBRIDEMENT LEFT HAND    Surgeon(s) and Role:     Aruna Dunn MD - Primary    Assistant(s):        Surgical Findings: open MCP joint with extensor tendon lac       Specimen: tisue for C&S     Estimated Blood Loss: Blood Output: 5 mL (5/30/2022 11:28 AM)      Dictation Number:       Tierra Parks MD  5/30/2022  11:57 AM

## 2022-05-30 NOTE — ANESTHESIA PROCEDURE NOTES
Airway  Date/Time: 5/30/2022 10:40 AM  Urgency: elective      General Information and Staff    Patient location during procedure: OR  Anesthesiologist: Naida Conrad MD  Performed: anesthesiologist     Indications and Patient Condition  Indications for airway management: anesthesia  Sedation level: deep  Preoxygenated: yes  Patient position: sniffing  Mask difficulty assessment: 1 - vent by mask    Final Airway Details  Final airway type: supraglottic airway      Successful airway: classic  Size 3      Number of attempts at approach: 1

## 2022-05-30 NOTE — PLAN OF CARE
Pt A&O. On O2 2L per NC. Pt states he does use sometimes at home with activity. He also wears cpap at night. Tele and  monitoring maintained. Tolerating regular diet. Last BM 5/29/22. Refusing Scds. Voiding w/o difficulty. Pain managed with current medications. Up with SB assist. Left hand splint w/ ace wrap drsg C/D/I. Elevated on pillows. Tolerating IV atbx as ordered. Pt plans to be dc'd home when ready, possibly tomorrow.

## 2022-05-31 VITALS
HEIGHT: 66.14 IN | BODY MASS INDEX: 29.73 KG/M2 | OXYGEN SATURATION: 90 % | HEART RATE: 82 BPM | SYSTOLIC BLOOD PRESSURE: 114 MMHG | TEMPERATURE: 98 F | WEIGHT: 185 LBS | DIASTOLIC BLOOD PRESSURE: 75 MMHG | RESPIRATION RATE: 18 BRPM

## 2022-05-31 PROCEDURE — 99239 HOSP IP/OBS DSCHRG MGMT >30: CPT | Performed by: STUDENT IN AN ORGANIZED HEALTH CARE EDUCATION/TRAINING PROGRAM

## 2022-05-31 RX ORDER — KETOROLAC TROMETHAMINE 15 MG/ML
15 INJECTION, SOLUTION INTRAMUSCULAR; INTRAVENOUS EVERY 6 HOURS PRN
Status: DISCONTINUED | OUTPATIENT
Start: 2022-05-31 | End: 2022-05-31

## 2022-05-31 RX ORDER — ONDANSETRON 4 MG/1
8 TABLET, ORALLY DISINTEGRATING ORAL EVERY 8 HOURS PRN
Qty: 12 TABLET | Refills: 0 | Status: SHIPPED | OUTPATIENT
Start: 2022-05-31

## 2022-05-31 RX ORDER — GARLIC EXTRACT 500 MG
1 CAPSULE ORAL DAILY
Qty: 10 CAPSULE | Refills: 0 | Status: SHIPPED | OUTPATIENT
Start: 2022-05-31 | End: 2022-06-10

## 2022-05-31 RX ORDER — GARLIC EXTRACT 500 MG
1 CAPSULE ORAL DAILY
Status: DISCONTINUED | OUTPATIENT
Start: 2022-05-31 | End: 2022-05-31

## 2022-05-31 RX ORDER — CLINDAMYCIN HYDROCHLORIDE 300 MG/1
600 CAPSULE ORAL 3 TIMES DAILY
Qty: 60 CAPSULE | Refills: 0 | Status: SHIPPED | OUTPATIENT
Start: 2022-05-31 | End: 2022-06-10

## 2022-05-31 NOTE — PROGRESS NOTES
Pt c/o nausea prior to discharge. Spoke with Dr. Beryl Ennis, she will call in script to his pharmacy for zofran. Pt given ginger ale. His son here now to take him home. PCT took pt to lobby via w/c for DC at this time.

## 2022-05-31 NOTE — PLAN OF CARE
Patient A&O X4 on 2L O2 hx MAL w/ CPAP. VSS, /IS/telemetry- NSR. Refusing SCDs, lovenox. Voiding freely, LBM 5/30. Regular diet- tolerating well, denies n/v. On Clindamycin Q8h. Surgical incision to left hand covered with splint/ACE wrap, c/d/i. Encouraged to elevate on pillows. Pain controlled with IV Toradol. Up ad zhen. Reminded to use call light. Plan for possible dc home in a day or so.

## 2022-05-31 NOTE — PLAN OF CARE
Pt A & O x4, on RA. MAL with CPAP, pt wears 3L oxygen at home at night. Refusing SCDs. Lovenox. Regular diet. Pt had BM today. Pt up ad zhen. Splint to left hand CDI/ace wrap. Pt informed to elevate hand on pillows. Plan for DC today at 80, his son is picking him up by ER. PIV removed. Pt last dose of clindamycin at 1000 given. Pt to f/u on Friday at ortho clinic. Will continue to monitor.

## 2022-05-31 NOTE — CONSULTS
[facility] Pharmacy Note:  Age Based Dose Adjustment    Lawerence Hodgkin has been prescribed ketorolac (TORADOL) 30 mg IV every 6 hours prn. Patient is >71 years old therefore the dose has been adjusted to 15 mg IV every 6 hours prn.       Thank you,  Elizabeth Monet, PharmD  5/31/2022 8:21 AM

## 2022-05-31 NOTE — OPERATIVE REPORT
[address]    PATIENT'S NAME: Isabel Charles   ATTENDING PHYSICIAN: Irvin Langley. Elif Garcia MD   OPERATING PHYSICIAN: Farhad Garcia M.D. PATIENT ACCOUNT#:   [de-identified]    LOCATION:  PACU [address] [place] [address]  MEDICAL RECORD #:   [id number]       YOB: 1945  ADMISSION DATE:       05/27/2022      OPERATION DATE:  05/30/2022    OPERATIVE REPORT    PREOPERATIVE DIAGNOSIS:  Left hand infection. POSTOPERATIVE DIAGNOSIS:  Left hand infection with extensor mechanism laceration. PROCEDURE:  Left hand incision and drainage of the MCP joint with repair of the extensor mechanism at the MCP joint. ANESTHESIA:  General.    ESTIMATED BLOOD LOSS:  Minimal.    TOURNIQUET TIME:  Please see OR records. SPECIMENS:  Tissue for culture and sensitivity. COMPLICATIONS:  None. DISPOSITION:  To recovery room. PLAN:  Patient will be splinted for 1 week and then can transition over to a motion splint. INDICATIONS:  The patient is a 39-year-old gentleman who had sustained a laceration to the left hand with a knife. Injury occurred weeks ago. He had drainage from the site. This was felt to be likely an injury into the joint with drainage from the joint. He was therefore offered surgical intervention. The risks and benefits of the procedure were discussed in detail with the patient, including risk of chronic pain, stiffness, skin healing problems, infection. He shows good understanding of these issues and wished to proceed with surgery. FINDINGS:  Patient with laceration of the extensor mechanism with granulation tissue present. OPERATIVE TECHNIQUE:  On date of operation, I saw the patient in the holding room, initialed the surgical site. The patient was taken to the operating room and was placed on the OR table. General endotracheal anesthesia was initiated. Tourniquet was placed about the left biceps, and the left upper extremity was prepped and draped in standard surgical fashion.   A surgical time-out was taken for in which the proper patient, surgical site, and procedure were verified. The area surrounding the second  MCP joint was infiltrated with plain lidocaine. The limb was exsanguinated with an Esmarch, and tourniquet was inflated to 250 mmHg. The previous laceration was opened and was extended both radially and ulnarly as this was a transverse laceration. Full-thickness skin flaps were raised. There was inflammatory tissue that was present that was debrided back to healthy tissue. There was a significant laceration in the extensor mechanism and essentially an open MCP joint. The granulation tissue was debrided back to healthy tissue, and a significant portion of the capsular tissue that appeared to be infected was resected. The wound was copiously irrigated with 3 L of normal saline. The extensor mechanism was then repaired with horizontal mattress 3-0 Vicryl suture in a modified horizontal mattress configuration. This brought about good coverage of the MCP joint. The skin flaps were then loosely reapproximated with interrupted 4-0 nylon suture. Sterile bulky dressings and a volar splint, splinting the MCP joint extension, were placed. The tourniquet was released and fingers pinked up nicely. The patient was taken to the recovery room in fair condition. He will be admitted for postoperative observation.      Dictated By Jessica Bacon M.D.  d: 05/30/2022 12:12:03  t: 05/30/2022 12:21:18  Job [id number]  /

## 2022-06-02 ENCOUNTER — PATIENT OUTREACH (OUTPATIENT)
Dept: CASE MANAGEMENT | Age: 77
End: 2022-06-02

## 2022-06-02 ENCOUNTER — TELEPHONE (OUTPATIENT)
Dept: FAMILY MEDICINE CLINIC | Facility: CLINIC | Age: 77
End: 2022-06-02

## 2022-06-02 DIAGNOSIS — Z02.9 ENCOUNTERS FOR UNSPECIFIED ADMINISTRATIVE PURPOSE: ICD-10-CM

## 2022-06-02 DIAGNOSIS — L03.114 CELLULITIS OF LEFT UPPER EXTREMITY: ICD-10-CM

## 2022-06-02 PROCEDURE — 1111F DSCHRG MED/CURRENT MED MERGE: CPT

## 2022-06-02 NOTE — TELEPHONE ENCOUNTER
SHIRLEY, Spoke to pt for TCM today. Patient has upcoming appointment with Dr. Raysa Espinal to establish as a new patient, please discuss with PCP, if okay to change appt to TCM/HFU.  TCM/HFU appt recommended by 6/7/22 as pt is a high risk for readmission. Patient states he was prescribed Clindamycin in the ER, which is making him very sick, he c/o indigestion and diarrhea, he is not able to continue this medication. Patient just moved to the [facility] and is asking if Dr. Raysa Espinal would be able to switch him to different antibiotic? Please advise.       BOOK BY DATE (last date for TCM): 6/14/22

## 2022-06-03 NOTE — TELEPHONE ENCOUNTER
Unfortunately I cannot prescribe any medication without seeing the patient, he should call his surgeon

## 2022-12-07 ENCOUNTER — TELEPHONE (OUTPATIENT)
Dept: SURGERY | Facility: CLINIC | Age: 77
End: 2022-12-07

## 2022-12-09 NOTE — TELEPHONE ENCOUNTER
I called the pt. He stated that his previous urologist retired and they were having trouble obtaining the records.  He will attempt to get them for his upcoming appt
Patient is supposed to see me next week to establish care and prior Urologist in [place]. Can you ask patient to bring records from prior Urologist to appt or request we have records faxed to our office for appt?      Thanks,  MPH
SICU CONSULT NOTE    HPI  57 y/o male h/o HTN, CAD - s/p recent stent in July 2021 at [facility] ([facility] in 2017 as well), HLD, previously presenting in Sept 2021 for obstructive jaundice s/p ERCP with CBD and PD stent placement on 9/7 - now presenting to surgical ICU s/p whipple and cholecystectomy for hemodynamic monitoring.    Per anesthesia, intra-op blood loss ~400 cc, s/p 3500 cc NS and 500 cc albumin intra-op.  cc. Off lindsay shortly after extubation. On arrival to SICU pt HDS, A&Ox4. A-line and PIV x 2 in place. Lactate trending upwards. From cardiac standpoint, pt to be continued on ASA daily in setting of cardiac stent in July 2021.      PAST MEDICAL HISTORY: No pertinent past medical history    Hypertension    Hyperlipidemia    CAD (coronary artery disease)    PAST SURGICAL HISTORY: No significant past surgical history    S/P coronary artery stent placement    FAMILY HISTORY: FH: MI in first degree male relative (Father)    FHx: heart disease (Mother)    SOCIAL HISTORY:    CODE STATUS:     HOME MEDICATIONS:    ALLERGIES: No Known Allergies    VITAL SIGNS:  ICU Vital Signs Last 24 Hrs  T(C): 37.9 (06 Oct 2021 16:00), Max: 37.9 (06 Oct 2021 16:00)  T(F): 100.2 (06 Oct 2021 16:00), Max: 100.2 (06 Oct 2021 16:00)  HR: 77 (06 Oct 2021 17:00) (58 - 82)  BP: 128/70 (06 Oct 2021 15:20) (128/70 - 140/90)  BP(mean): 83 (06 Oct 2021 15:20) (83 - 83)  ABP: 168/81 (06 Oct 2021 17:00) (138/67 - 168/81)  ABP(mean): 112 (06 Oct 2021 17:00) (93 - 112)  RR: 20 (06 Oct 2021 17:00) (11 - 20)  SpO2: 98% (06 Oct 2021 17:00) (95% - 98%)      NEURO  Exam: A&O x 4 moving all extremities spontaneously  acetaminophen  IVPB .. 1000 milliGRAM(s) IV Intermittent once PRN Mild Pain (1 - 3)    RESPIRATORY  Mechanical Ventilation:   ABG - ( 06 Oct 2021 15:56 )  pH: 7.37  /  pCO2: 41    /  pO2: 101   / HCO3: 24    / Base Excess: -1.5  /  SaO2: 98.5    Lactate: x      Exam: CTAB. Normal effort.    CARDIOVASCULAR  Exam: RRR. Extremities warm and well perfused  Cardiac Rhythm: SR  metoprolol tartrate Injectable 5 milliGRAM(s) IV Push every 6 hours    GI/NUTRITION  Exam: Abd appropriately TTP. Wound dressings clean, dry and intact  Diet:  pantoprazole  Injectable 40 milliGRAM(s) IV Push daily    GENITOURINARY/RENAL  calcium gluconate IVPB 2 Gram(s) IV Intermittent once  dextrose 5% + sodium chloride 0.45%. 1000 milliLiter(s) IV Continuous <Continuous>  magnesium sulfate  IVPB 4 Gram(s) IV Intermittent once      10-06 @ 07:01  -  10-06 @ 17:08  --------------------------------------------------------  IN:    dextrose 5% + sodium chloride 0.45%: 100 mL  Total IN: 100 mL    OUT:  Total OUT: 0 mL    Total NET: 100 mL        Weight (kg): 68 (10-06 @ 05:45)  10-06    139  |  103  |  13  ----------------------------<  169<H>  3.9   |  21<L>  |  0.64    Ca    8.4      06 Oct 2021 15:56  Phos  3.7     10-06  Mg     1.50     10-06    TPro  6.6  /  Alb  3.7  /  TBili  1.1  /  DBili  x   /  AST  73<H>  /  ALT  68<H>  /  AlkPhos  193<H>  10-06    [ ] Moyer catheter, indication: urine output monitoring in critically ill patient    HEMATOLOGIC  [ ] VTE Prophylaxis:  heparin   Injectable 5000 Unit(s) SubCutaneous every 8 hours                          11.6   17.55 )-----------( 352      ( 06 Oct 2021 15:56 )             34.3     PT/INR - ( 06 Oct 2021 15:56 )   PT: 14.2 sec;   INR: 1.25 ratio         PTT - ( 06 Oct 2021 15:56 )  PTT:30.9 sec  Transfusion: [ ] PRBC	[ ] Platelets	[ ] FFP	[ ] Cryoprecipitate      INFECTIOUS DISEASES    RECENT CULTURES:      ENDOCRINE    CAPILLARY BLOOD GLUCOSE    PATIENT CARE ACCESS DEVICES:  [ ] Peripheral IV  [ ] Central Venous Line	[ ] R	[ ] L	[ ] IJ	[ ] Fem	[ ] SC	Placed:   [ ] Arterial Line		[ ] R	[ ] L	[ ] Fem	[ ] Rad	[ ] Ax	Placed:   [ ] PICC:					[ ] Mediport  [ ] Urinary Catheter, Date Placed:   [x] Necessity of urinary, arterial, and venous catheters discussed    OTHER MEDICATIONS:     IMAGING STUDIES:

## 2022-12-14 ENCOUNTER — LAB ENCOUNTER (OUTPATIENT)
Dept: LAB | Age: 77
End: 2022-12-14
Attending: UROLOGY
Payer: MEDICARE

## 2022-12-14 ENCOUNTER — OFFICE VISIT (OUTPATIENT)
Dept: SURGERY | Facility: CLINIC | Age: 77
End: 2022-12-14
Payer: MEDICARE

## 2022-12-14 DIAGNOSIS — Z12.5 SCREENING PSA (PROSTATE SPECIFIC ANTIGEN): Primary | ICD-10-CM

## 2022-12-14 DIAGNOSIS — N39.41 URGE INCONTINENCE: ICD-10-CM

## 2022-12-14 DIAGNOSIS — N40.1 BPH WITH OBSTRUCTION/LOWER URINARY TRACT SYMPTOMS: ICD-10-CM

## 2022-12-14 DIAGNOSIS — N13.8 BPH WITH OBSTRUCTION/LOWER URINARY TRACT SYMPTOMS: ICD-10-CM

## 2022-12-14 DIAGNOSIS — Z12.5 SCREENING PSA (PROSTATE SPECIFIC ANTIGEN): ICD-10-CM

## 2022-12-14 LAB — COMPLEXED PSA SERPL-MCNC: 0.96 NG/ML (ref ?–4)

## 2022-12-14 PROCEDURE — 36415 COLL VENOUS BLD VENIPUNCTURE: CPT

## 2022-12-14 PROCEDURE — 99204 OFFICE O/P NEW MOD 45 MIN: CPT | Performed by: UROLOGY

## 2022-12-14 RX ORDER — FINASTERIDE 5 MG/1
5 TABLET, FILM COATED ORAL DAILY
Qty: 90 TABLET | Refills: 6 | Status: SHIPPED | OUTPATIENT
Start: 2022-12-14

## 2022-12-14 RX ORDER — TAMSULOSIN HYDROCHLORIDE 0.4 MG/1
0.4 CAPSULE ORAL DAILY
Qty: 90 CAPSULE | Refills: 5 | Status: SHIPPED | OUTPATIENT
Start: 2022-12-14

## 2022-12-14 RX ORDER — FINASTERIDE 5 MG/1
5 TABLET, FILM COATED ORAL DAILY
Qty: 90 TABLET | Refills: 6 | Status: SHIPPED | OUTPATIENT
Start: 2022-12-14 | End: 2022-12-14

## 2022-12-14 RX ORDER — TAMSULOSIN HYDROCHLORIDE 0.4 MG/1
0.4 CAPSULE ORAL DAILY
Qty: 90 CAPSULE | Refills: 5 | Status: SHIPPED | OUTPATIENT
Start: 2022-12-14 | End: 2022-12-14

## 2023-03-14 ENCOUNTER — HOSPITAL ENCOUNTER (OUTPATIENT)
Dept: CV DIAGNOSTICS | Facility: HOSPITAL | Age: 78
Discharge: HOME | End: 2023-03-14
Attending: INTERNAL MEDICINE
Payer: MEDICARE

## 2023-03-14 ENCOUNTER — LAB ENCOUNTER (OUTPATIENT)
Dept: LAB | Facility: HOSPITAL | Age: 78
End: 2023-03-14
Attending: INTERNAL MEDICINE
Payer: MEDICARE

## 2023-03-14 ENCOUNTER — LAB ENCOUNTER (OUTPATIENT)
Dept: LAB | Age: 78
End: 2023-03-14
Attending: INTERNAL MEDICINE
Payer: MEDICARE

## 2023-03-14 DIAGNOSIS — M19.90 ARTHRITIS: ICD-10-CM

## 2023-03-14 DIAGNOSIS — R06.09 DOE (DYSPNEA ON EXERTION): ICD-10-CM

## 2023-03-14 DIAGNOSIS — R53.83 FATIGUE: ICD-10-CM

## 2023-03-14 DIAGNOSIS — I10 BENIGN ESSENTIAL HYPERTENSION: ICD-10-CM

## 2023-03-14 DIAGNOSIS — M19.90 ARTHRITIS: Primary | ICD-10-CM

## 2023-03-14 DIAGNOSIS — I27.20 PULMONARY HTN (HCC): ICD-10-CM

## 2023-03-14 DIAGNOSIS — I10 ESSENTIAL HYPERTENSION, BENIGN: ICD-10-CM

## 2023-03-14 DIAGNOSIS — I27.20 PHT (PULMONARY HYPERTENSION) (HCC): ICD-10-CM

## 2023-03-14 LAB
ALBUMIN SERPL-MCNC: 3.8 G/DL (ref 3.4–5)
ALBUMIN/GLOB SERPL: 1.1 {RATIO} (ref 1–2)
ALP LIVER SERPL-CCNC: 86 U/L
ALT SERPL-CCNC: 20 U/L
ANION GAP SERPL CALC-SCNC: 7 MMOL/L (ref 0–18)
AST SERPL-CCNC: 35 U/L (ref 15–37)
BASOPHILS # BLD AUTO: 0.03 X10(3) UL (ref 0–0.2)
BASOPHILS NFR BLD AUTO: 0.6 %
BILIRUB SERPL-MCNC: 1.3 MG/DL (ref 0.1–2)
BUN BLD-MCNC: 11 MG/DL (ref 7–18)
CALCIUM BLD-MCNC: 9.3 MG/DL (ref 8.5–10.1)
CHLORIDE SERPL-SCNC: 104 MMOL/L (ref 98–112)
CHOLEST SERPL-MCNC: 173 MG/DL (ref ?–200)
CO2 SERPL-SCNC: 29 MMOL/L (ref 21–32)
CREAT BLD-MCNC: 0.96 MG/DL
EOSINOPHIL # BLD AUTO: 0.09 X10(3) UL (ref 0–0.7)
EOSINOPHIL NFR BLD AUTO: 1.8 %
ERYTHROCYTE [DISTWIDTH] IN BLOOD BY AUTOMATED COUNT: 12.6 %
ERYTHROCYTE [SEDIMENTATION RATE] IN BLOOD: 20 MM/HR
FASTING PATIENT LIPID ANSWER: YES
FASTING STATUS PATIENT QL REPORTED: YES
GFR SERPLBLD BASED ON 1.73 SQ M-ARVRAT: 80 ML/MIN/1.73M2 (ref 60–?)
GLOBULIN PLAS-MCNC: 3.5 G/DL (ref 2.8–4.4)
GLUCOSE BLD-MCNC: 90 MG/DL (ref 70–99)
HCT VFR BLD AUTO: 44.6 %
HDLC SERPL-MCNC: 59 MG/DL (ref 40–59)
HGB BLD-MCNC: 15.4 G/DL
IMM GRANULOCYTES # BLD AUTO: 0.01 X10(3) UL (ref 0–1)
IMM GRANULOCYTES NFR BLD: 0.2 %
LDLC SERPL CALC-MCNC: 101 MG/DL (ref ?–100)
LYMPHOCYTES # BLD AUTO: 2.82 X10(3) UL (ref 1–4)
LYMPHOCYTES NFR BLD AUTO: 56.1 %
MCH RBC QN AUTO: 35.5 PG (ref 26–34)
MCHC RBC AUTO-ENTMCNC: 34.5 G/DL (ref 31–37)
MCV RBC AUTO: 102.8 FL
MONOCYTES # BLD AUTO: 0.39 X10(3) UL (ref 0.1–1)
MONOCYTES NFR BLD AUTO: 7.8 %
NEUTROPHILS # BLD AUTO: 1.69 X10 (3) UL (ref 1.5–7.7)
NEUTROPHILS # BLD AUTO: 1.69 X10(3) UL (ref 1.5–7.7)
NEUTROPHILS NFR BLD AUTO: 33.5 %
NONHDLC SERPL-MCNC: 114 MG/DL (ref ?–130)
NT-PROBNP SERPL-MCNC: 300 PG/ML (ref ?–450)
OSMOLALITY SERPL CALC.SUM OF ELEC: 289 MOSM/KG (ref 275–295)
PLATELET # BLD AUTO: 167 10(3)UL (ref 150–450)
POTASSIUM SERPL-SCNC: 4.2 MMOL/L (ref 3.5–5.1)
PROT SERPL-MCNC: 7.3 G/DL (ref 6.4–8.2)
RBC # BLD AUTO: 4.34 X10(6)UL
SODIUM SERPL-SCNC: 140 MMOL/L (ref 136–145)
TRIGL SERPL-MCNC: 65 MG/DL (ref 30–149)
TSI SER-ACNC: 1.65 MIU/ML (ref 0.36–3.74)
VLDLC SERPL CALC-MCNC: 11 MG/DL (ref 0–30)
WBC # BLD AUTO: 5 X10(3) UL (ref 4–11)

## 2023-03-14 PROCEDURE — 80061 LIPID PANEL: CPT

## 2023-03-14 PROCEDURE — 85652 RBC SED RATE AUTOMATED: CPT

## 2023-03-14 PROCEDURE — 84443 ASSAY THYROID STIM HORMONE: CPT

## 2023-03-14 PROCEDURE — 83880 ASSAY OF NATRIURETIC PEPTIDE: CPT

## 2023-03-14 PROCEDURE — 93306 TTE W/DOPPLER COMPLETE: CPT | Performed by: INTERNAL MEDICINE

## 2023-03-14 PROCEDURE — 36415 COLL VENOUS BLD VENIPUNCTURE: CPT

## 2023-03-14 PROCEDURE — 80053 COMPREHEN METABOLIC PANEL: CPT

## 2023-03-14 PROCEDURE — 85025 COMPLETE CBC W/AUTO DIFF WBC: CPT

## 2023-08-30 ENCOUNTER — OFFICE VISIT (OUTPATIENT)
Dept: SURGERY | Facility: CLINIC | Age: 78
End: 2023-08-30

## 2023-08-30 DIAGNOSIS — N52.9 ERECTILE DYSFUNCTION, UNSPECIFIED ERECTILE DYSFUNCTION TYPE: ICD-10-CM

## 2023-08-30 DIAGNOSIS — N40.1 BPH WITH OBSTRUCTION/LOWER URINARY TRACT SYMPTOMS: Primary | ICD-10-CM

## 2023-08-30 DIAGNOSIS — N39.41 URGE INCONTINENCE: ICD-10-CM

## 2023-08-30 DIAGNOSIS — N13.8 BPH WITH OBSTRUCTION/LOWER URINARY TRACT SYMPTOMS: Primary | ICD-10-CM

## 2023-08-30 DIAGNOSIS — R31.0 GROSS HEMATURIA: ICD-10-CM

## 2023-08-30 PROCEDURE — 51798 US URINE CAPACITY MEASURE: CPT | Performed by: UROLOGY

## 2023-08-30 PROCEDURE — 99214 OFFICE O/P EST MOD 30 MIN: CPT | Performed by: UROLOGY

## 2023-08-30 PROCEDURE — 1160F RVW MEDS BY RX/DR IN RCRD: CPT | Performed by: UROLOGY

## 2023-08-30 PROCEDURE — 1159F MED LIST DOCD IN RCRD: CPT | Performed by: UROLOGY

## 2023-08-30 RX ORDER — SOLIFENACIN SUCCINATE 10 MG/1
10 TABLET, FILM COATED ORAL DAILY
Qty: 30 TABLET | Refills: 11 | Status: SHIPPED | OUTPATIENT
Start: 2023-08-30

## 2023-08-30 RX ORDER — TADALAFIL 20 MG/1
20 TABLET ORAL
Qty: 30 TABLET | Refills: 5 | Status: SHIPPED | OUTPATIENT
Start: 2023-08-30

## 2023-11-06 RX ORDER — GABAPENTIN 400 MG/1
800 CAPSULE ORAL 3 TIMES DAILY
COMMUNITY

## 2023-11-21 ENCOUNTER — ANESTHESIA (OUTPATIENT)
Dept: ENDOSCOPY | Facility: HOSPITAL | Age: 78
End: 2023-11-21
Payer: MEDICARE

## 2023-11-21 ENCOUNTER — HOSPITAL ENCOUNTER (OUTPATIENT)
Facility: HOSPITAL | Age: 78
Setting detail: HOSPITAL OUTPATIENT SURGERY
Discharge: HOME | End: 2023-11-21
Attending: INTERNAL MEDICINE | Admitting: INTERNAL MEDICINE
Payer: MEDICARE

## 2023-11-21 ENCOUNTER — ANESTHESIA EVENT (OUTPATIENT)
Dept: ENDOSCOPY | Facility: HOSPITAL | Age: 78
End: 2023-11-21
Payer: MEDICARE

## 2023-11-21 VITALS
WEIGHT: 190 LBS | HEART RATE: 65 BPM | OXYGEN SATURATION: 95 % | HEIGHT: 65 IN | SYSTOLIC BLOOD PRESSURE: 147 MMHG | BODY MASS INDEX: 31.65 KG/M2 | RESPIRATION RATE: 18 BRPM | DIASTOLIC BLOOD PRESSURE: 78 MMHG

## 2023-11-21 PROCEDURE — 0DB38ZX EXCISION OF LOWER ESOPHAGUS, VIA NATURAL OR ARTIFICIAL OPENING ENDOSCOPIC, DIAGNOSTIC: ICD-10-PCS | Performed by: INTERNAL MEDICINE

## 2023-11-21 PROCEDURE — 0DB68ZX EXCISION OF STOMACH, VIA NATURAL OR ARTIFICIAL OPENING ENDOSCOPIC, DIAGNOSTIC: ICD-10-PCS | Performed by: INTERNAL MEDICINE

## 2023-11-21 PROCEDURE — 88305 TISSUE EXAM BY PATHOLOGIST: CPT | Performed by: INTERNAL MEDICINE

## 2023-11-21 PROCEDURE — 0DB18ZX EXCISION OF UPPER ESOPHAGUS, VIA NATURAL OR ARTIFICIAL OPENING ENDOSCOPIC, DIAGNOSTIC: ICD-10-PCS | Performed by: INTERNAL MEDICINE

## 2023-11-21 PROCEDURE — 0D738ZZ DILATION OF LOWER ESOPHAGUS, VIA NATURAL OR ARTIFICIAL OPENING ENDOSCOPIC: ICD-10-PCS | Performed by: INTERNAL MEDICINE

## 2023-11-21 RX ORDER — NALOXONE HYDROCHLORIDE 0.4 MG/ML
0.08 INJECTION, SOLUTION INTRAMUSCULAR; INTRAVENOUS; SUBCUTANEOUS ONCE AS NEEDED
Status: DISCONTINUED | OUTPATIENT
Start: 2023-11-21 | End: 2023-11-21

## 2023-11-21 RX ORDER — SODIUM CHLORIDE, SODIUM LACTATE, POTASSIUM CHLORIDE, CALCIUM CHLORIDE 600; 310; 30; 20 MG/100ML; MG/100ML; MG/100ML; MG/100ML
INJECTION, SOLUTION INTRAVENOUS CONTINUOUS
Status: DISCONTINUED | OUTPATIENT
Start: 2023-11-21 | End: 2023-11-21

## 2023-11-21 RX ORDER — LIDOCAINE HYDROCHLORIDE 10 MG/ML
INJECTION, SOLUTION EPIDURAL; INFILTRATION; INTRACAUDAL; PERINEURAL AS NEEDED
Status: DISCONTINUED | OUTPATIENT
Start: 2023-11-21 | End: 2023-11-21 | Stop reason: SURG

## 2023-11-21 RX ADMIN — LIDOCAINE HYDROCHLORIDE 50 MG: 10 INJECTION, SOLUTION EPIDURAL; INFILTRATION; INTRACAUDAL; PERINEURAL at 10:14:00

## 2023-11-21 NOTE — ANESTHESIA POSTPROCEDURE EVALUATION
[address] Patient Status:  Hospital Outpatient Surgery   Age/Gender [de-identified]year old male MRN [id number]   Location [address] [address] Attending Ирина Patel MD   [facility] Day # 0 PCP Andrew Pineda MD       Anesthesia Post-op Note    ESOPHAGOGASTRODUODENOSCOPY (EGD) with biopsies and balloon dilation 18-20mm    Procedure Summary       Date: 11/21/23 Room / Location: [facility] ENDOSCOPY 04 / [facility] ENDOSCOPY    Anesthesia Start: 1649 Anesthesia Stop:     Procedure: ESOPHAGOGASTRODUODENOSCOPY (EGD) with biopsies and balloon dilation 18-20mm Diagnosis: (Schatzki's ring)    Surgeons: Ирина Patel MD Anesthesiologist: Sammy English MD    Anesthesia Type: MAC ASA Status: 3            Anesthesia Type: MAC    Vitals Value Taken Time   /70 11/21/23 1028   Temp na 11/21/23 1028   Pulse 72 11/21/23 1028   Resp 16 11/21/23 1028   SpO2 93 11/21/23 1028       Patient Location: Endoscopy    Anesthesia Type: MAC    Airway Patency: patent    Postop Pain Control: adequate    Mental Status: mildly sedated but able to meaningfully participate in the post-anesthesia evaluation    Nausea/Vomiting: none    Cardiopulmonary/Hydration status: stable euvolemic    Complications: no apparent anesthesia related complications    Postop vital signs: stable    Dental Exam: Unchanged from Preop    Patient to be discharged from PACU when criteria met.

## 2023-11-21 NOTE — H&P
[address] Patient Status:  Hospital Outpatient Surgery    1945 MRN [id number]   Location [address] [facility] 28 Attending Darcie Lucero MD   [address] Day # 0 PCP Jhonatan Reyes MD     Date:  2023  Date of Admission:  2023    History provided by:patient  Chief Complaint:    dysphagia    HPI:   Maren Lund is a(n) [de-identified]year old male. Here for dysphagia    History     Past Medical History:   Diagnosis Date    Arthritis     Asthma     Back problem     Cataract     COPD (chronic obstructive pulmonary disease) (HCC)     Degenerative disc disease, cervical     Eczema     Esophageal reflux     Essential hypertension     Hearing impairment     bilateral hearing aids    High blood pressure     Interstitial lung disease (HCC)     Muscle weakness     Osteoarthritis     Osteopenia     Problems with swallowing     Pulmonary hypertension (HCC)     Scoliosis     Sleep apnea     cpap    Thoracic kyphosis     Visual impairment      Past Surgical History:   Procedure Laterality Date    APPENDECTOMY      HERNIA SURGERY      HIP REPLACEMENT SURGERY Left     REPAIR ING HERNIA,5+Y/O,REDUCIBL      SPINE SURGERY PROCEDURE UNLISTED  2019    lumbar fusion    SPINE SURGERY PROCEDURE UNLISTED      LUMBAR SURGERY X 2     History reviewed. No pertinent family history. Social History:  Social History     Socioeconomic History    Marital status:    Tobacco Use    Smoking status: Former     Types: Cigarettes    Smokeless tobacco: Never   Vaping Use    Vaping Use: Never used   Substance and Sexual Activity    Alcohol use: Yes     Alcohol/week: 14.0 standard drinks of alcohol     Types: 14 Standard drinks or equivalent per week    Drug use: Never     Allergies/Medications: Allergies:    Allergies   Allergen Reactions    Lisinopril ANGIOEDEMA    Tramadol ANGIOEDEMA    Codeine UNKNOWN     Medications Prior to Admission   Medication Sig gabapentin 400 MG Oral Cap Take 2 capsules (800 mg total) by mouth 3 (three) times daily. Solifenacin Succinate (VESICARE) 10 MG Oral Tab Take 1 tablet (10 mg total) by mouth daily. finasteride 5 MG Oral Tab Take 1 tablet (5 mg total) by mouth daily. tamsulosin 0.4 MG Oral Cap Take 1 capsule (0.4 mg total) by mouth daily. montelukast 10 MG Oral Tab Take 1 tablet (10 mg total) by mouth nightly. celecoxib 200 MG Oral Cap Take 1 capsule (200 mg total) by mouth 2 (two) times daily. losartan 50 MG Oral Tab Take by mouth daily. escitalopram 10 MG Oral Tab Take 1 tablet (10 mg total) by mouth daily. cholecalciferol 50 MCG (2000 UT) Oral Cap Take 500 Units by mouth daily. albuterol (5 MG/ML) 0.5% Inhalation Nebu Soln Take 0.5 mL (2.5 mg total) by nebulization every 6 (six) hours as needed for Wheezing. Tadalafil (CIALIS) 20 MG Oral Tab Take 1 tablet (20 mg total) by mouth daily as needed for Erectile Dysfunction. fexofenadine 180 MG Oral Tab Take 180 mg by mouth daily. Budesonide-Formoterol Fumarate 160-4.5 MCG/ACT Inhalation Aerosol Inhale 2 puffs into the lungs 2 (two) times daily. triamcinolone 55 MCG/ACT Nasal Aerosol by Nasal route daily. clobetasol 0.05 % External Cream Apply topically 2 (two) times daily. polyethylene glycol, PEG 3350, 17 g Oral Powd Pack Take 17 g by mouth daily as needed. Review of Systems:   Pertinent items are noted in HPI. A comprehensive review of systems was negative. Physical Exam:   Vital Signs:  Height 5' 4\" (1.626 m), weight 190 lb (86.2 kg).      General appearance:  alert, appears stated age, and cooperative  Head: Normocephalic, without obvious abnormality, atraumatic  Pulmonary: clear to auscultation bilaterally  Cardiovascular: S1, S2 normal, no murmur, click, rub or gallop, regular rate and rhythm  Abdominal: soft, non-tender; bowel sounds normal; no masses,  no organomegaly  Extremities: extremities normal, atraumatic, no cyanosis or edema        Results:     Lab Results   Component Value Date    WBC 5.0 03/14/2023    HGB 15.4 03/14/2023    HCT 44.6 03/14/2023    .0 03/14/2023    CREATSERUM 0.96 03/14/2023    BUN 11 03/14/2023     03/14/2023    K 4.2 03/14/2023     03/14/2023    CO2 29.0 03/14/2023    GLU 90 03/14/2023    CA 9.3 03/14/2023    ALB 3.8 03/14/2023    ALKPHO 86 03/14/2023    BILT 1.3 03/14/2023    TP 7.3 03/14/2023    AST 35 03/14/2023    ALT 20 03/14/2023    TSH 1.650 03/14/2023    ESRML 20 03/14/2023    CRP <0.29 05/27/2022       No results found.         Assessment/Plan:   Tenny Cushing, MD  11/21/2023

## 2024-01-03 ENCOUNTER — ORDER TRANSCRIPTION (OUTPATIENT)
Dept: CARDIAC REHAB | Facility: HOSPITAL | Age: 79
End: 2024-01-03

## 2024-01-03 DIAGNOSIS — I27.20 PULMONARY HYPERTENSION (HCC): Primary | ICD-10-CM

## 2024-01-10 ENCOUNTER — APPOINTMENT (OUTPATIENT)
Dept: CARDIAC REHAB | Facility: HOSPITAL | Age: 79
End: 2024-01-10
Attending: INTERNAL MEDICINE
Payer: MEDICARE

## 2024-01-15 ENCOUNTER — CARDPULM VISIT (OUTPATIENT)
Dept: CARDIAC REHAB | Facility: HOSPITAL | Age: 79
End: 2024-01-15
Attending: INTERNAL MEDICINE
Payer: MEDICARE

## 2024-01-18 ENCOUNTER — CARDPULM VISIT (OUTPATIENT)
Dept: CARDIAC REHAB | Facility: HOSPITAL | Age: 79
End: 2024-01-18
Attending: INTERNAL MEDICINE
Payer: MEDICARE

## 2024-01-23 ENCOUNTER — CARDPULM VISIT (OUTPATIENT)
Dept: CARDIAC REHAB | Facility: HOSPITAL | Age: 79
End: 2024-01-23
Attending: INTERNAL MEDICINE
Payer: MEDICARE

## 2024-01-25 ENCOUNTER — CARDPULM VISIT (OUTPATIENT)
Dept: CARDIAC REHAB | Facility: HOSPITAL | Age: 79
End: 2024-01-25
Attending: INTERNAL MEDICINE
Payer: MEDICARE

## 2024-01-29 ENCOUNTER — OFFICE VISIT (OUTPATIENT)
Dept: SURGERY | Facility: CLINIC | Age: 79
End: 2024-01-29
Payer: COMMERCIAL

## 2024-01-29 DIAGNOSIS — N52.9 ERECTILE DYSFUNCTION, UNSPECIFIED ERECTILE DYSFUNCTION TYPE: ICD-10-CM

## 2024-01-29 DIAGNOSIS — N13.8 BPH WITH OBSTRUCTION/LOWER URINARY TRACT SYMPTOMS: Primary | ICD-10-CM

## 2024-01-29 DIAGNOSIS — N40.1 BPH WITH OBSTRUCTION/LOWER URINARY TRACT SYMPTOMS: Primary | ICD-10-CM

## 2024-01-29 DIAGNOSIS — N39.46 MIXED INCONTINENCE URGE AND STRESS: ICD-10-CM

## 2024-01-29 LAB
APPEARANCE: CLEAR
BILIRUBIN: NEGATIVE
GLUCOSE (URINE DIPSTICK): NEGATIVE MG/DL
KETONES (URINE DIPSTICK): NEGATIVE MG/DL
LEUKOCYTES: NEGATIVE
MULTISTIX LOT#: NORMAL NUMERIC
NITRITE, URINE: NEGATIVE
OCCULT BLOOD: NEGATIVE
PH, URINE: 6.5 (ref 4.5–8)
PROTEIN (URINE DIPSTICK): NEGATIVE MG/DL
SPECIFIC GRAVITY: <=1.005 (ref 1–1.03)
URINE-COLOR: YELLOW
UROBILINOGEN,SEMI-QN: 0.2 MG/DL (ref 0–1.9)

## 2024-01-29 PROCEDURE — 99214 OFFICE O/P EST MOD 30 MIN: CPT | Performed by: UROLOGY

## 2024-01-29 PROCEDURE — 81003 URINALYSIS AUTO W/O SCOPE: CPT | Performed by: UROLOGY

## 2024-01-29 PROCEDURE — 51798 US URINE CAPACITY MEASURE: CPT | Performed by: UROLOGY

## 2024-01-29 PROCEDURE — 1159F MED LIST DOCD IN RCRD: CPT | Performed by: UROLOGY

## 2024-01-29 PROCEDURE — 1160F RVW MEDS BY RX/DR IN RCRD: CPT | Performed by: UROLOGY

## 2024-01-29 NOTE — PROGRESS NOTES
HPI:     Duke Garcia III is a 78 year old male with a PMH of HTN, ILD, MAL, pulm HTN, asthma, scoliosis, DDD, OA, eczema.    Following for:  1. BPH/LUTS  - flomax for years, proscar 12/14/22  2. OAB/UI and fecal urgency  - using depends since 2021, Kegels reviewed  - oxybutynin was discussed by prior urologist but he didn't try  3. Prostate nodule  - stable for many years, noted during first exam with prior Urologist and no bx or MRI done  4. h/o gross hematuria and blood in stool  - s/p neg cysto and GI scope in 2016  - no recurrence  5. S/p left hydrocelectomy ~ 2015    PCP - [facility]  Prior Urologist - Kirill Villalobos ([place] - retired)  LOV 8/30/2023    Presents for check-up and wife is here today.  He is taking flomax, proscar and now 10 mg vesicare. Was doing well up until about a week ago and started having a lot of frequency, nocturia. Drinks 2 glasses wine qhs. Exercises twice a week for pulm rehab.    AUA SS is 21/35 with 5 n; 4 f, CLAUDE; 3 w, u; 2 I. Was 20/35 prior to proscar with 5 u; 4 f; 3 I, CLAUDE; 2 n, w; 1 s. Mostly unhappy with LUTS.  Incontinence: mixed 50/50 incontinence leaks into 2-3 depends, typically just damp. He is doing Kegels but not regularly. Has not made PFT appt.  Penoscrotal LOV: no abnormalities  ROSE LOV: ~ 2 cm nodule noted at apex, no tenderness    Recent UA negative and PVR is 115 mL, was 70 mL LOV and 121 mL prior.    Reported ~ 40-60 oz water, 20 coffee with light yellow urine.    PSA 0.855 1/25/24    Has poor potency and cannot get partial erections. Tried low dose viagra many y ago. Requests 20 mg cialis.    We discussed anticholinergic medications as well as myrbetriq for OAB and reviewed SEs to both options as well as possible cost of medications. The patient would prefer adding myrbetriq in addition to vesicare and reviewed risk of weak stream/retaining.    We again discussed PFT as option for both bladder and bowel urgency and mixed urinary incontinence. He would like to  do this.    Continue increased water and cut back on grapes, wine for OAB and exercise more. Continue flomax, proscar. Continue 10 mg vesicare and trial 50 mg myrbetriq. Wife will help get him scheduled for PFT appt. Trial 20 mg cials prn for ED. F/u 6 weeks for check-up with PVR.    Prior note:    Reports 2-3 y h/o OAB/LUTS which is worsening.    UTI hx: none  Diarrhea/constipation: intermittently both  Gross hematuria: several y ago had blood in stool and urine (had neg eval)  Tobacco hx: 20 pack years, quit 2015  Kidney stone hx: none  Fam h/o  malignancy: none    We reviewed cystitis tx and prevention strategies at today's visit and I provided educational materials for this. I encouraged the patient to drink at least 40-60 oz water per day or enough to keep urine clear. I also reviewed dietary irritants and provided educational materals for this. We also discussed trying prn AZO for pain relief with plenty of water.    We discussed Kegel exercises for incontinence. I provided and reviewed educational materials for this. He may also be interested in PFT later but wife has cardiac issues so probably won't do this right now.    HISTORY:  Past Medical History:   Diagnosis Date    Arthritis     Asthma     Back problem     Cataract     COPD (chronic obstructive pulmonary disease) (HCC)     Degenerative disc disease, cervical     Eczema     Esophageal reflux     Essential hypertension     Hearing impairment     bilateral hearing aids    High blood pressure     Interstitial lung disease (HCC)     Muscle weakness     Osteoarthritis     Osteopenia     Problems with swallowing     Pulmonary hypertension (HCC)     Scoliosis     Sleep apnea     cpap    Thoracic kyphosis     Visual impairment       Past Surgical History:   Procedure Laterality Date    APPENDECTOMY      HERNIA SURGERY      HIP REPLACEMENT SURGERY Left     REPAIR ING HERNIA,5+Y/O,REDUCIBL      SPINE SURGERY PROCEDURE UNLISTED  2019    lumbar fusion    SPINE  SURGERY PROCEDURE UNLISTED      LUMBAR SURGERY X 2      History reviewed. No pertinent family history.   Social History:   Social History     Socioeconomic History    Marital status:    Tobacco Use    Smoking status: Former     Types: Cigarettes    Smokeless tobacco: Never   Vaping Use    Vaping Use: Never used   Substance and Sexual Activity    Alcohol use: Yes     Alcohol/week: 14.0 standard drinks of alcohol     Types: 14 Standard drinks or equivalent per week    Drug use: Never        Medications (Active prior to today's visit):  Current Outpatient Medications   Medication Sig Dispense Refill    gabapentin 400 MG Oral Cap Take 2 capsules (800 mg total) by mouth 3 (three) times daily.      Solifenacin Succinate (VESICARE) 10 MG Oral Tab Take 1 tablet (10 mg total) by mouth daily. 30 tablet 11    Tadalafil (CIALIS) 20 MG Oral Tab Take 1 tablet (20 mg total) by mouth daily as needed for Erectile Dysfunction. 30 tablet 5    finasteride 5 MG Oral Tab Take 1 tablet (5 mg total) by mouth daily. 90 tablet 6    tamsulosin 0.4 MG Oral Cap Take 1 capsule (0.4 mg total) by mouth daily. 90 capsule 5    fexofenadine 180 MG Oral Tab Take 1 tablet (180 mg total) by mouth daily.      Budesonide-Formoterol Fumarate 160-4.5 MCG/ACT Inhalation Aerosol Inhale 2 puffs into the lungs 2 (two) times daily.      montelukast 10 MG Oral Tab Take 1 tablet (10 mg total) by mouth nightly.      celecoxib 200 MG Oral Cap Take 1 capsule (200 mg total) by mouth 2 (two) times daily.      losartan 50 MG Oral Tab Take by mouth daily.      escitalopram 10 MG Oral Tab Take 1 tablet (10 mg total) by mouth daily.      triamcinolone 55 MCG/ACT Nasal Aerosol by Nasal route daily.      clobetasol 0.05 % External Cream Apply topically 2 (two) times daily.      polyethylene glycol, PEG 3350, 17 g Oral Powd Pack Take 17 g by mouth daily as needed.      cholecalciferol 50 MCG (2000 UT) Oral Cap Take 500 Units by mouth daily.      albuterol (5 MG/ML)  0.5% Inhalation Nebu Soln Take 0.5 mL (2.5 mg total) by nebulization every 6 (six) hours as needed for Wheezing.         Allergies:  Allergies   Allergen Reactions    Lisinopril ANGIOEDEMA and UNKNOWN    Tramadol ANGIOEDEMA and UNKNOWN    Codeine UNKNOWN         ROS:     A comprehensive 10 point review of systems was completed.  Pertinent positives and negatives noted in the the HPI.    PHYSICAL EXAM:     GENERAL APPEARANCE: well, developed, well nourished, in no acute distress  NEUROLOGIC: nonfocal, alert and oriented  HEAD: normocephalic, atraumatic  EYES: sclera non-icteric  EARS: hearing intact  ORAL CAVITY: mucosa moist  NECK/THYROID: no obvious goiter or masses  LUNGS: nonlabored breathing  ABDOMEN: soft, no obvious masses or tenderness  SKIN: no obvious rashes    : as noted above    ASSESSMENT/PLAN:   Diagnoses and all orders for this visit:    BPH with obstruction/lower urinary tract symptoms  -     URINALYSIS, AUTO, W/O SCOPE    Mixed incontinence urge and stress  -     PELVIC FLOOR THERAPY - INTERNAL  -     SPECIALTY (OTHER) - EXTERNAL    Erectile dysfunction, unspecified erectile dysfunction type      - as noted above.    Thanks again for this consult.    Franki Palma MD, FACS  Urologist  [facility]  Office: [phone]

## 2024-01-30 ENCOUNTER — CARDPULM VISIT (OUTPATIENT)
Dept: CARDIAC REHAB | Facility: HOSPITAL | Age: 79
End: 2024-01-30
Attending: INTERNAL MEDICINE
Payer: MEDICARE

## 2024-02-01 ENCOUNTER — CARDPULM VISIT (OUTPATIENT)
Dept: CARDIAC REHAB | Facility: HOSPITAL | Age: 79
End: 2024-02-01
Attending: INTERNAL MEDICINE
Payer: MEDICARE

## 2024-02-06 ENCOUNTER — CARDPULM VISIT (OUTPATIENT)
Dept: CARDIAC REHAB | Facility: HOSPITAL | Age: 79
End: 2024-02-06
Attending: INTERNAL MEDICINE
Payer: MEDICARE

## 2024-02-08 ENCOUNTER — CARDPULM VISIT (OUTPATIENT)
Dept: CARDIAC REHAB | Facility: HOSPITAL | Age: 79
End: 2024-02-08
Attending: INTERNAL MEDICINE
Payer: MEDICARE

## 2024-02-13 ENCOUNTER — CARDPULM VISIT (OUTPATIENT)
Dept: CARDIAC REHAB | Facility: HOSPITAL | Age: 79
End: 2024-02-13
Attending: INTERNAL MEDICINE
Payer: MEDICARE

## 2024-02-15 ENCOUNTER — CARDPULM VISIT (OUTPATIENT)
Dept: CARDIAC REHAB | Facility: HOSPITAL | Age: 79
End: 2024-02-15
Attending: INTERNAL MEDICINE
Payer: MEDICARE

## 2024-02-20 ENCOUNTER — CARDPULM VISIT (OUTPATIENT)
Dept: CARDIAC REHAB | Facility: HOSPITAL | Age: 79
End: 2024-02-20
Attending: INTERNAL MEDICINE
Payer: MEDICARE

## 2024-02-22 ENCOUNTER — CARDPULM VISIT (OUTPATIENT)
Dept: CARDIAC REHAB | Facility: HOSPITAL | Age: 79
End: 2024-02-22
Attending: INTERNAL MEDICINE
Payer: MEDICARE

## 2024-02-27 ENCOUNTER — CARDPULM VISIT (OUTPATIENT)
Dept: CARDIAC REHAB | Facility: HOSPITAL | Age: 79
End: 2024-02-27
Attending: INTERNAL MEDICINE
Payer: MEDICARE

## 2024-02-29 ENCOUNTER — CARDPULM VISIT (OUTPATIENT)
Dept: CARDIAC REHAB | Facility: HOSPITAL | Age: 79
End: 2024-02-29
Attending: INTERNAL MEDICINE
Payer: MEDICARE

## 2024-03-05 ENCOUNTER — CARDPULM VISIT (OUTPATIENT)
Dept: CARDIAC REHAB | Facility: HOSPITAL | Age: 79
End: 2024-03-05
Attending: INTERNAL MEDICINE
Payer: MEDICARE

## 2024-03-07 ENCOUNTER — CARDPULM VISIT (OUTPATIENT)
Dept: CARDIAC REHAB | Facility: HOSPITAL | Age: 79
End: 2024-03-07
Attending: INTERNAL MEDICINE
Payer: MEDICARE

## 2024-03-11 DIAGNOSIS — N40.1 BPH WITH OBSTRUCTION/LOWER URINARY TRACT SYMPTOMS: ICD-10-CM

## 2024-03-11 DIAGNOSIS — N13.8 BPH WITH OBSTRUCTION/LOWER URINARY TRACT SYMPTOMS: ICD-10-CM

## 2024-03-12 ENCOUNTER — CARDPULM VISIT (OUTPATIENT)
Dept: CARDIAC REHAB | Facility: HOSPITAL | Age: 79
End: 2024-03-12
Attending: INTERNAL MEDICINE
Payer: MEDICARE

## 2024-03-13 ENCOUNTER — OFFICE VISIT (OUTPATIENT)
Dept: SURGERY | Facility: CLINIC | Age: 79
End: 2024-03-13

## 2024-03-13 ENCOUNTER — TELEPHONE (OUTPATIENT)
Dept: SURGERY | Facility: CLINIC | Age: 79
End: 2024-03-13

## 2024-03-13 DIAGNOSIS — N39.41 URGE INCONTINENCE: ICD-10-CM

## 2024-03-13 DIAGNOSIS — N39.46 MIXED INCONTINENCE URGE AND STRESS: ICD-10-CM

## 2024-03-13 DIAGNOSIS — R82.90 URINE FINDING: Primary | ICD-10-CM

## 2024-03-13 DIAGNOSIS — R31.0 GROSS HEMATURIA: ICD-10-CM

## 2024-03-13 DIAGNOSIS — N40.1 BPH WITH OBSTRUCTION/LOWER URINARY TRACT SYMPTOMS: ICD-10-CM

## 2024-03-13 DIAGNOSIS — N52.9 ERECTILE DYSFUNCTION, UNSPECIFIED ERECTILE DYSFUNCTION TYPE: ICD-10-CM

## 2024-03-13 DIAGNOSIS — N13.8 BPH WITH OBSTRUCTION/LOWER URINARY TRACT SYMPTOMS: ICD-10-CM

## 2024-03-13 PROCEDURE — 99214 OFFICE O/P EST MOD 30 MIN: CPT | Performed by: UROLOGY

## 2024-03-13 PROCEDURE — 1160F RVW MEDS BY RX/DR IN RCRD: CPT | Performed by: UROLOGY

## 2024-03-13 PROCEDURE — 1159F MED LIST DOCD IN RCRD: CPT | Performed by: UROLOGY

## 2024-03-13 RX ORDER — MIRABEGRON 50 MG/1
50 TABLET, FILM COATED, EXTENDED RELEASE ORAL DAILY
Qty: 90 EACH | Refills: 5 | Status: SHIPPED | OUTPATIENT
Start: 2024-03-13

## 2024-03-13 NOTE — PATIENT INSTRUCTIONS
1. Increase water intake to 40-60 ounces (2 liters) per day or enough to keep urine clear to pale yellow.  2. Cut back on dietary irritants such as coffee, tea, soda, juice, wine   3. Stop solifenacin.  If symptoms worsen you can resume.  4. Continue tamsulosin, finasteride, mirabegron

## 2024-03-13 NOTE — TELEPHONE ENCOUNTER
Per wife Dr. Palma was going to give a month sample of medication and they left without getting them. Per wife she will be there to pick them up tomorrow 3/14 around 1pm.  Thank you

## 2024-03-13 NOTE — PROGRESS NOTES
HPI:     Duke Garcia III is a 79 year old male with a PMH of HTN, ILD, MLA, pulm HTN, asthma, scoliosis, DDD, OA, eczema.    Following for:  1. BPH/LUTS  - flomax for years, proscar 12/14/22  2. OAB/UI and fecal urgency  - using depends since 2021, Kegels reviewed  - oxybutynin was discussed by prior urologist but he didn't try  3. Prostate nodule  - stable for many years, noted during first exam with prior Urologist and no bx or MRI done  4. h/o gross hematuria and blood in stool  - s/p neg cysto and GI scope in 2016  - no recurrence  5. S/p left hydrocelectomy ~ 2015    PCP - [facility]  Prior Urologist - Kirill Villalobos ([place] - retired)  LOV 1/29/2024    Presents for check-up and wife is here today.  He is taking flomax, proscar and 10 mg vesicare. Now taking myrbetriq. Urinary symptoms are much better with myrbetriq.  Drinks 2 glasses wine qhs. Exercises twice a week for pulm rehab.    AUA SS is 11/35 with 3 CLAUDE; 2 n, s; 1 w, u, I, f. Was 21/35 prior to myrbetriq with 5 n; 4 f, CLAUDE; 3 w, u; 2 I. Was 20/35 prior to proscar with 5 u; 4 f; 3 I, CLAUDE; 2 n, w; 1 s. Mostly happy with LUTS.  Incontinence: much improved incontinence- now just 1 PPD and often dry. Prior to myrbetriq had 2-3 depends per day, typically just damp. He is doing Kegels but not regularly. Has not made PFT appt.  Penoscrotal LOV: no abnormalities  ROSE LOV: ~ 2 cm nodule noted at apex, no tenderness    UA is negative and PVR is 229 mL - was 115 mL LOV, 70, 121 mL prior.    Reported ~ 30-40 oz water, 20 coffee withlight yellow urine.    PSA 0.855 1/25/24    Has poor potency and cannot get partial erections. Has Rx for 20 mg cialis prn.  Tried low dose viagra many y ago.     We have discussed PFT as option for both bladder and bowel urgency and mixed urinary incontinence. He is doing pretty well with myrbetriq.    Continue increased water and cut back on grapes, wine for OAB and exercise more. Continue flomax, proscar. Continue 50 mg myrbetriq  and stopping 10 mg vesicare. Wife will help get him scheduled for PFT appt. Trial 20 mg cials prn for ED. F/u in 6 mo for check-up with PVR.    Prior note:  Was doing well up until about a week ago and started having a lot of frequency, nocturia.     Reports 2-3 y h/o OAB/LUTS which is worsening.    UTI hx: none  Diarrhea/constipation: intermittently both  Gross hematuria: several y ago had blood in stool and urine (had neg eval)  Tobacco hx: 20 pack years, quit 2015  Kidney stone hx: none  Fam h/o  malignancy: none    We reviewed cystitis tx and prevention strategies at today's visit and I provided educational materials for this. I encouraged the patient to drink at least 40-60 oz water per day or enough to keep urine clear. I also reviewed dietary irritants and provided educational materals for this. We also discussed trying prn AZO for pain relief with plenty of water.    We discussed Kegel exercises for incontinence. I provided and reviewed educational materials for this. He may also be interested in PFT later but wife has cardiac issues so probably won't do this right now.    HISTORY:  Past Medical History:   Diagnosis Date    Arthritis     Asthma (HCC)     Back problem     Cataract     COPD (chronic obstructive pulmonary disease) ([facility])     Degenerative disc disease, cervical     Eczema     Esophageal reflux     Essential hypertension     Hearing impairment     bilateral hearing aids    High blood pressure     Interstitial lung disease (HCC)     Muscle weakness     Osteoarthritis     Osteopenia     Problems with swallowing     Pulmonary hypertension (HCC)     Scoliosis     Sleep apnea     cpap    Thoracic kyphosis     Visual impairment       Past Surgical History:   Procedure Laterality Date    APPENDECTOMY      HERNIA SURGERY      HIP REPLACEMENT SURGERY Left     REPAIR ING HERNIA,5+Y/O,REDUCIBL      SPINE SURGERY PROCEDURE UNLISTED  2019    lumbar fusion    SPINE SURGERY PROCEDURE UNLISTED      LUMBAR  SURGERY X 2      History reviewed. No pertinent family history.   Social History:   Social History     Socioeconomic History    Marital status:    Tobacco Use    Smoking status: Former     Types: Cigarettes    Smokeless tobacco: Never   Vaping Use    Vaping Use: Never used   Substance and Sexual Activity    Alcohol use: Yes     Alcohol/week: 14.0 standard drinks of alcohol     Types: 14 Standard drinks or equivalent per week    Drug use: Never        Medications (Active prior to today's visit):  Current Outpatient Medications   Medication Sig Dispense Refill    Mirabegron ER (MYRBETRIQ) 50 MG Oral Tablet 24 Hr Take 1 tablet (50 mg total) by mouth daily. 90 each 5    gabapentin 400 MG Oral Cap Take 2 capsules (800 mg total) by mouth 3 (three) times daily.      Tadalafil (CIALIS) 20 MG Oral Tab Take 1 tablet (20 mg total) by mouth daily as needed for Erectile Dysfunction. 30 tablet 5    finasteride 5 MG Oral Tab Take 1 tablet (5 mg total) by mouth daily. 90 tablet 6    tamsulosin 0.4 MG Oral Cap Take 1 capsule (0.4 mg total) by mouth daily. 90 capsule 5    fexofenadine 180 MG Oral Tab Take 1 tablet (180 mg total) by mouth daily.      Budesonide-Formoterol Fumarate 160-4.5 MCG/ACT Inhalation Aerosol Inhale 2 puffs into the lungs 2 (two) times daily.      montelukast 10 MG Oral Tab Take 1 tablet (10 mg total) by mouth nightly.      celecoxib 200 MG Oral Cap Take 1 capsule (200 mg total) by mouth 2 (two) times daily.      losartan 50 MG Oral Tab Take by mouth daily.      escitalopram 10 MG Oral Tab Take 1 tablet (10 mg total) by mouth daily.      triamcinolone 55 MCG/ACT Nasal Aerosol by Nasal route daily.      clobetasol 0.05 % External Cream Apply topically 2 (two) times daily.      polyethylene glycol, PEG 3350, 17 g Oral Powd Pack Take 17 g by mouth daily as needed.      cholecalciferol 50 MCG (2000 UT) Oral Cap Take 500 Units by mouth daily.      albuterol (5 MG/ML) 0.5% Inhalation Nebu Soln Take 0.5 mL (2.5  mg total) by nebulization every 6 (six) hours as needed for Wheezing.         Allergies:  Allergies   Allergen Reactions    Lisinopril ANGIOEDEMA and UNKNOWN    Tramadol ANGIOEDEMA and UNKNOWN    Codeine UNKNOWN         ROS:     A comprehensive 10 point review of systems was completed.  Pertinent positives and negatives noted in the the HPI.    PHYSICAL EXAM:     GENERAL APPEARANCE: well, developed, well nourished, in no acute distress  NEUROLOGIC: nonfocal, alert and oriented  HEAD: normocephalic, atraumatic  EYES: sclera non-icteric  EARS: hearing intact  ORAL CAVITY: mucosa moist  NECK/THYROID: no obvious goiter or masses  LUNGS: nonlabored breathing  ABDOMEN: soft, no obvious masses or tenderness  SKIN: no obvious rashes    : as noted above    ASSESSMENT/PLAN:   Diagnoses and all orders for this visit:    Urine finding  -     Cancel: POC Urinalysis, Automated Dip without microscopy (PCA and EMMG ONLY) [[id number]]    BPH with obstruction/lower urinary tract symptoms    Mixed incontinence urge and stress    Erectile dysfunction, unspecified erectile dysfunction type    Urge incontinence  -     Mirabegron ER (MYRBETRIQ) 50 MG Oral Tablet 24 Hr; Take 1 tablet (50 mg total) by mouth daily.  -     PELVIC FLOOR THERAPY - INTERNAL  -     SPECIALTY (OTHER) - EXTERNAL    Gross hematuria    - as noted above.    Thanks again for this consult.    Franki Palma MD, FACS  Urologist  [facility]  Office: [phone]

## 2024-03-14 ENCOUNTER — CARDPULM VISIT (OUTPATIENT)
Dept: CARDIAC REHAB | Facility: HOSPITAL | Age: 79
End: 2024-03-14
Attending: INTERNAL MEDICINE
Payer: MEDICARE

## 2024-03-15 RX ORDER — FINASTERIDE 5 MG/1
5 TABLET, FILM COATED ORAL DAILY
Qty: 90 TABLET | Refills: 6 | Status: SHIPPED | OUTPATIENT
Start: 2024-03-15

## 2024-03-19 ENCOUNTER — CARDPULM VISIT (OUTPATIENT)
Dept: CARDIAC REHAB | Facility: HOSPITAL | Age: 79
End: 2024-03-19
Attending: INTERNAL MEDICINE
Payer: MEDICARE

## 2024-03-21 ENCOUNTER — CARDPULM VISIT (OUTPATIENT)
Dept: CARDIAC REHAB | Facility: HOSPITAL | Age: 79
End: 2024-03-21
Attending: INTERNAL MEDICINE
Payer: MEDICARE

## 2024-03-26 ENCOUNTER — APPOINTMENT (OUTPATIENT)
Dept: CARDIAC REHAB | Facility: HOSPITAL | Age: 79
End: 2024-03-26
Attending: INTERNAL MEDICINE
Payer: MEDICARE

## 2024-03-28 ENCOUNTER — APPOINTMENT (OUTPATIENT)
Dept: CARDIAC REHAB | Facility: HOSPITAL | Age: 79
End: 2024-03-28
Attending: INTERNAL MEDICINE
Payer: MEDICARE

## 2024-04-02 ENCOUNTER — CARDPULM VISIT (OUTPATIENT)
Dept: CARDIAC REHAB | Facility: HOSPITAL | Age: 79
End: 2024-04-02
Attending: INTERNAL MEDICINE
Payer: MEDICARE

## 2024-04-04 ENCOUNTER — APPOINTMENT (OUTPATIENT)
Dept: CARDIAC REHAB | Facility: HOSPITAL | Age: 79
End: 2024-04-04
Attending: INTERNAL MEDICINE
Payer: MEDICARE

## 2024-04-05 DIAGNOSIS — N40.1 BPH WITH OBSTRUCTION/LOWER URINARY TRACT SYMPTOMS: ICD-10-CM

## 2024-04-05 DIAGNOSIS — N13.8 BPH WITH OBSTRUCTION/LOWER URINARY TRACT SYMPTOMS: ICD-10-CM

## 2024-04-09 ENCOUNTER — APPOINTMENT (OUTPATIENT)
Dept: CARDIAC REHAB | Facility: HOSPITAL | Age: 79
End: 2024-04-09
Attending: INTERNAL MEDICINE
Payer: MEDICARE

## 2024-04-09 RX ORDER — TAMSULOSIN HYDROCHLORIDE 0.4 MG/1
0.4 CAPSULE ORAL DAILY
Qty: 90 CAPSULE | Refills: 5 | Status: SHIPPED | OUTPATIENT
Start: 2024-04-09

## 2024-04-11 ENCOUNTER — APPOINTMENT (OUTPATIENT)
Dept: CARDIAC REHAB | Facility: HOSPITAL | Age: 79
End: 2024-04-11
Attending: INTERNAL MEDICINE
Payer: MEDICARE

## 2024-04-16 ENCOUNTER — APPOINTMENT (OUTPATIENT)
Dept: CARDIAC REHAB | Facility: HOSPITAL | Age: 79
End: 2024-04-16
Attending: INTERNAL MEDICINE
Payer: MEDICARE

## 2024-04-18 ENCOUNTER — APPOINTMENT (OUTPATIENT)
Dept: CARDIAC REHAB | Facility: HOSPITAL | Age: 79
End: 2024-04-18
Attending: INTERNAL MEDICINE
Payer: MEDICARE

## 2024-04-23 ENCOUNTER — APPOINTMENT (OUTPATIENT)
Dept: CARDIAC REHAB | Facility: HOSPITAL | Age: 79
End: 2024-04-23
Attending: INTERNAL MEDICINE
Payer: MEDICARE

## 2024-04-25 ENCOUNTER — APPOINTMENT (OUTPATIENT)
Dept: CARDIAC REHAB | Facility: HOSPITAL | Age: 79
End: 2024-04-25
Attending: INTERNAL MEDICINE
Payer: MEDICARE

## 2024-04-30 ENCOUNTER — APPOINTMENT (OUTPATIENT)
Dept: CARDIAC REHAB | Facility: HOSPITAL | Age: 79
End: 2024-04-30
Attending: INTERNAL MEDICINE
Payer: MEDICARE

## 2024-05-01 ENCOUNTER — HOSPITAL ENCOUNTER (OUTPATIENT)
Facility: HOSPITAL | Age: 79
Setting detail: OBSERVATION
Discharge: HOME | End: 2024-05-02
Attending: EMERGENCY MEDICINE | Admitting: INTERNAL MEDICINE
Payer: MEDICARE

## 2024-05-01 ENCOUNTER — APPOINTMENT (OUTPATIENT)
Dept: CT IMAGING | Facility: HOSPITAL | Age: 79
End: 2024-05-01
Attending: EMERGENCY MEDICINE
Payer: MEDICARE

## 2024-05-01 DIAGNOSIS — R55 SYNCOPE, UNSPECIFIED SYNCOPE TYPE: Primary | ICD-10-CM

## 2024-05-01 DIAGNOSIS — R26.89 POOR BALANCE: ICD-10-CM

## 2024-05-01 LAB
ALBUMIN SERPL-MCNC: 3.6 G/DL (ref 3.4–5)
ALBUMIN/GLOB SERPL: 1.2 {RATIO} (ref 1–2)
ALP LIVER SERPL-CCNC: 74 U/L
ALT SERPL-CCNC: 16 U/L
ANION GAP SERPL CALC-SCNC: 3 MMOL/L (ref 0–18)
AST SERPL-CCNC: 23 U/L (ref 15–37)
BASOPHILS # BLD AUTO: 0.03 X10(3) UL (ref 0–0.2)
BASOPHILS NFR BLD AUTO: 0.8 %
BILIRUB SERPL-MCNC: 1.6 MG/DL (ref 0.1–2)
BUN BLD-MCNC: 11 MG/DL (ref 9–23)
CALCIUM BLD-MCNC: 8.9 MG/DL (ref 8.5–10.1)
CHLORIDE SERPL-SCNC: 108 MMOL/L (ref 98–112)
CO2 SERPL-SCNC: 28 MMOL/L (ref 21–32)
CREAT BLD-MCNC: 0.86 MG/DL
EGFRCR SERPLBLD CKD-EPI 2021: 87 ML/MIN/1.73M2 (ref 60–?)
EOSINOPHIL # BLD AUTO: 0.06 X10(3) UL (ref 0–0.7)
EOSINOPHIL NFR BLD AUTO: 1.6 %
ERYTHROCYTE [DISTWIDTH] IN BLOOD BY AUTOMATED COUNT: 13.4 %
GLOBULIN PLAS-MCNC: 2.9 G/DL (ref 2.8–4.4)
GLUCOSE BLD-MCNC: 106 MG/DL (ref 70–99)
HCT VFR BLD AUTO: 38.4 %
HGB BLD-MCNC: 13.2 G/DL
IMM GRANULOCYTES # BLD AUTO: 0.01 X10(3) UL (ref 0–1)
IMM GRANULOCYTES NFR BLD: 0.3 %
LYMPHOCYTES # BLD AUTO: 1.49 X10(3) UL (ref 1–4)
LYMPHOCYTES NFR BLD AUTO: 40.1 %
MCH RBC QN AUTO: 36.3 PG (ref 26–34)
MCHC RBC AUTO-ENTMCNC: 34.4 G/DL (ref 31–37)
MCV RBC AUTO: 105.5 FL
MONOCYTES # BLD AUTO: 0.32 X10(3) UL (ref 0.1–1)
MONOCYTES NFR BLD AUTO: 8.6 %
NEUTROPHILS # BLD AUTO: 1.81 X10 (3) UL (ref 1.5–7.7)
NEUTROPHILS # BLD AUTO: 1.81 X10(3) UL (ref 1.5–7.7)
NEUTROPHILS NFR BLD AUTO: 48.6 %
OSMOLALITY SERPL CALC.SUM OF ELEC: 288 MOSM/KG (ref 275–295)
PLATELET # BLD AUTO: 145 10(3)UL (ref 150–450)
POTASSIUM SERPL-SCNC: 4.5 MMOL/L (ref 3.5–5.1)
PROT SERPL-MCNC: 6.5 G/DL (ref 6.4–8.2)
RBC # BLD AUTO: 3.64 X10(6)UL
SODIUM SERPL-SCNC: 139 MMOL/L (ref 136–145)
TROPONIN I SERPL HS-MCNC: 5 NG/L
WBC # BLD AUTO: 3.7 X10(3) UL (ref 4–11)

## 2024-05-01 PROCEDURE — 84484 ASSAY OF TROPONIN QUANT: CPT

## 2024-05-01 PROCEDURE — 70450 CT HEAD/BRAIN W/O DYE: CPT | Performed by: EMERGENCY MEDICINE

## 2024-05-01 PROCEDURE — 96360 HYDRATION IV INFUSION INIT: CPT

## 2024-05-01 PROCEDURE — 93010 ELECTROCARDIOGRAM REPORT: CPT

## 2024-05-01 PROCEDURE — 85025 COMPLETE CBC W/AUTO DIFF WBC: CPT | Performed by: EMERGENCY MEDICINE

## 2024-05-01 PROCEDURE — 96361 HYDRATE IV INFUSION ADD-ON: CPT

## 2024-05-01 PROCEDURE — 99285 EMERGENCY DEPT VISIT HI MDM: CPT

## 2024-05-01 PROCEDURE — 80053 COMPREHEN METABOLIC PANEL: CPT

## 2024-05-01 PROCEDURE — 85025 COMPLETE CBC W/AUTO DIFF WBC: CPT

## 2024-05-01 PROCEDURE — 80053 COMPREHEN METABOLIC PANEL: CPT | Performed by: EMERGENCY MEDICINE

## 2024-05-01 PROCEDURE — 84484 ASSAY OF TROPONIN QUANT: CPT | Performed by: EMERGENCY MEDICINE

## 2024-05-01 PROCEDURE — 93005 ELECTROCARDIOGRAM TRACING: CPT

## 2024-05-01 RX ORDER — FLUTICASONE FUROATE AND VILANTEROL 200; 25 UG/1; UG/1
1 POWDER RESPIRATORY (INHALATION) DAILY
Status: DISCONTINUED | OUTPATIENT
Start: 2024-05-02 | End: 2024-05-02

## 2024-05-01 RX ORDER — TAMSULOSIN HYDROCHLORIDE 0.4 MG/1
0.4 CAPSULE ORAL DAILY
Status: DISCONTINUED | OUTPATIENT
Start: 2024-05-02 | End: 2024-05-02

## 2024-05-01 RX ORDER — MELATONIN
3 NIGHTLY PRN
Status: DISCONTINUED | OUTPATIENT
Start: 2024-05-01 | End: 2024-05-02

## 2024-05-01 RX ORDER — MONTELUKAST SODIUM 10 MG/1
10 TABLET ORAL NIGHTLY
Status: DISCONTINUED | OUTPATIENT
Start: 2024-05-01 | End: 2024-05-02

## 2024-05-01 RX ORDER — ACETAMINOPHEN 500 MG
500 TABLET ORAL EVERY 4 HOURS PRN
Status: DISCONTINUED | OUTPATIENT
Start: 2024-05-01 | End: 2024-05-02

## 2024-05-01 RX ORDER — ENOXAPARIN SODIUM 100 MG/ML
40 INJECTION SUBCUTANEOUS DAILY
Status: DISCONTINUED | OUTPATIENT
Start: 2024-05-02 | End: 2024-05-02

## 2024-05-01 RX ORDER — CETIRIZINE HYDROCHLORIDE 10 MG/1
10 TABLET ORAL DAILY
Status: DISCONTINUED | OUTPATIENT
Start: 2024-05-02 | End: 2024-05-01

## 2024-05-01 RX ORDER — METOCLOPRAMIDE HYDROCHLORIDE 5 MG/ML
10 INJECTION INTRAMUSCULAR; INTRAVENOUS EVERY 8 HOURS PRN
Status: DISCONTINUED | OUTPATIENT
Start: 2024-05-01 | End: 2024-05-02

## 2024-05-01 RX ORDER — SODIUM CHLORIDE 9 MG/ML
INJECTION, SOLUTION INTRAVENOUS CONTINUOUS
Status: DISCONTINUED | OUTPATIENT
Start: 2024-05-01 | End: 2024-05-02

## 2024-05-01 RX ORDER — CETIRIZINE HYDROCHLORIDE 5 MG/1
5 TABLET ORAL DAILY
Status: DISCONTINUED | OUTPATIENT
Start: 2024-05-02 | End: 2024-05-02

## 2024-05-01 RX ORDER — LOSARTAN POTASSIUM 50 MG/1
50 TABLET ORAL DAILY
Status: DISCONTINUED | OUTPATIENT
Start: 2024-05-02 | End: 2024-05-02

## 2024-05-01 RX ORDER — FINASTERIDE 5 MG/1
5 TABLET, FILM COATED ORAL DAILY
Status: DISCONTINUED | OUTPATIENT
Start: 2024-05-02 | End: 2024-05-02

## 2024-05-01 RX ORDER — ESCITALOPRAM OXALATE 10 MG/1
10 TABLET ORAL DAILY
Status: DISCONTINUED | OUTPATIENT
Start: 2024-05-02 | End: 2024-05-02

## 2024-05-01 RX ORDER — ONDANSETRON 2 MG/ML
4 INJECTION INTRAMUSCULAR; INTRAVENOUS EVERY 6 HOURS PRN
Status: DISCONTINUED | OUTPATIENT
Start: 2024-05-01 | End: 2024-05-02

## 2024-05-01 NOTE — ED INITIAL ASSESSMENT (HPI)
Patient ambulatory to ED. States he has had 3 falls in the last weeks due to passing out. Last syncopal episode was 1 week ago. Went to  today because someone mentioned to him he might have a concussion. Equal smile present, no arm/ leg weakness or numbness. Denies blood thinners or pain at this time.

## 2024-05-02 ENCOUNTER — APPOINTMENT (OUTPATIENT)
Dept: CV DIAGNOSTICS | Facility: HOSPITAL | Age: 79
End: 2024-05-02
Attending: INTERNAL MEDICINE
Payer: MEDICARE

## 2024-05-02 VITALS
OXYGEN SATURATION: 99 % | SYSTOLIC BLOOD PRESSURE: 141 MMHG | BODY MASS INDEX: 31.65 KG/M2 | WEIGHT: 190 LBS | HEIGHT: 65 IN | RESPIRATION RATE: 19 BRPM | HEART RATE: 68 BPM | DIASTOLIC BLOOD PRESSURE: 92 MMHG | TEMPERATURE: 98 F

## 2024-05-02 LAB
ANION GAP SERPL CALC-SCNC: 5 MMOL/L (ref 0–18)
ATRIAL RATE: 71 BPM
BASOPHILS # BLD AUTO: 0.03 X10(3) UL (ref 0–0.2)
BASOPHILS NFR BLD AUTO: 0.7 %
BUN BLD-MCNC: 11 MG/DL (ref 9–23)
CALCIUM BLD-MCNC: 8.7 MG/DL (ref 8.5–10.1)
CHLORIDE SERPL-SCNC: 110 MMOL/L (ref 98–112)
CO2 SERPL-SCNC: 27 MMOL/L (ref 21–32)
CREAT BLD-MCNC: 0.81 MG/DL
EGFRCR SERPLBLD CKD-EPI 2021: 89 ML/MIN/1.73M2 (ref 60–?)
EOSINOPHIL # BLD AUTO: 0.08 X10(3) UL (ref 0–0.7)
EOSINOPHIL NFR BLD AUTO: 1.9 %
ERYTHROCYTE [DISTWIDTH] IN BLOOD BY AUTOMATED COUNT: 13.4 %
GLUCOSE BLD-MCNC: 89 MG/DL (ref 70–99)
HCT VFR BLD AUTO: 37.3 %
HGB BLD-MCNC: 12.8 G/DL
IMM GRANULOCYTES # BLD AUTO: 0.01 X10(3) UL (ref 0–1)
IMM GRANULOCYTES NFR BLD: 0.2 %
LYMPHOCYTES # BLD AUTO: 1.77 X10(3) UL (ref 1–4)
LYMPHOCYTES NFR BLD AUTO: 42.5 %
MCH RBC QN AUTO: 35.8 PG (ref 26–34)
MCHC RBC AUTO-ENTMCNC: 34.3 G/DL (ref 31–37)
MCV RBC AUTO: 104.2 FL
MONOCYTES # BLD AUTO: 0.42 X10(3) UL (ref 0.1–1)
MONOCYTES NFR BLD AUTO: 10.1 %
NEUTROPHILS # BLD AUTO: 1.85 X10 (3) UL (ref 1.5–7.7)
NEUTROPHILS # BLD AUTO: 1.85 X10(3) UL (ref 1.5–7.7)
NEUTROPHILS NFR BLD AUTO: 44.6 %
OSMOLALITY SERPL CALC.SUM OF ELEC: 293 MOSM/KG (ref 275–295)
P AXIS: 27 DEGREES
P-R INTERVAL: 162 MS
PLATELET # BLD AUTO: 139 10(3)UL (ref 150–450)
POTASSIUM SERPL-SCNC: 3.7 MMOL/L (ref 3.5–5.1)
Q-T INTERVAL: 400 MS
QRS DURATION: 68 MS
QTC CALCULATION (BEZET): 434 MS
R AXIS: 37 DEGREES
RBC # BLD AUTO: 3.58 X10(6)UL
SODIUM SERPL-SCNC: 142 MMOL/L (ref 136–145)
T AXIS: 32 DEGREES
VENTRICULAR RATE: 71 BPM
WBC # BLD AUTO: 4.2 X10(3) UL (ref 4–11)

## 2024-05-02 PROCEDURE — 96372 THER/PROPH/DIAG INJ SC/IM: CPT

## 2024-05-02 PROCEDURE — 94660 CPAP INITIATION&MGMT: CPT

## 2024-05-02 PROCEDURE — 97161 PT EVAL LOW COMPLEX 20 MIN: CPT

## 2024-05-02 PROCEDURE — 93306 TTE W/DOPPLER COMPLETE: CPT | Performed by: INTERNAL MEDICINE

## 2024-05-02 PROCEDURE — 80048 BASIC METABOLIC PNL TOTAL CA: CPT | Performed by: INTERNAL MEDICINE

## 2024-05-02 PROCEDURE — 97165 OT EVAL LOW COMPLEX 30 MIN: CPT

## 2024-05-02 PROCEDURE — 97535 SELF CARE MNGMENT TRAINING: CPT

## 2024-05-02 PROCEDURE — 97116 GAIT TRAINING THERAPY: CPT

## 2024-05-02 PROCEDURE — 94640 AIRWAY INHALATION TREATMENT: CPT

## 2024-05-02 PROCEDURE — 85025 COMPLETE CBC W/AUTO DIFF WBC: CPT | Performed by: INTERNAL MEDICINE

## 2024-05-02 RX ORDER — ESCITALOPRAM OXALATE 20 MG/1
20 TABLET ORAL DAILY
Status: DISCONTINUED | OUTPATIENT
Start: 2024-05-02 | End: 2024-05-02

## 2024-05-02 NOTE — OCCUPATIONAL THERAPY NOTE
OCCUPATIONAL THERAPY EVALUATION - INPATIENT    Room Number: 7615/7615-A  Evaluation Date: 5/2/2024     Type of Evaluation: Initial  Presenting Problem: syncope    Physician Order: IP Consult to Occupational Therapy  Reason for Therapy:  ADL/IADL Dysfunction and Discharge Planning      OCCUPATIONAL THERAPY ASSESSMENT   Patient is a 79 year old male admitted on 5/1/2024 with Presenting Problem: syncope. Co-Morbidities : COPD, HTN, ILD, pulm HTN  Patient is currently functioning near baseline with self care and functional mobility.  Prior to admission, patient's baseline is mod I/supervision.  Patient met all OT goals at Mod I level.  Patient reports no further questions/concerns at this time.       CT BRAIN OR HEAD 5/1/24  CONCLUSION:  1. There is severe chronic small vessel ischemic change and atrophy. 2. Right mastoid effusion is noted. 3. There is otherwise no acute abnormality detected.     Patient will be discharged from OT services at this time. Will benefit from home at discharge from the hospital.         WEIGHT BEARING RESTRICTION  Weight Bearing Restriction: None                Recommendations for nursing staff:   Transfers: 1 person, rolling walker  Toileting location: Toilet    EVALUATION SESSION:  Patient at start of session: sanding by toilet with RN.   FUNCTIONAL TRANSFER ASSESSMENT  Sit to Stand: Edge of Bed  Edge of Bed: Modified Independent  Toilet Transfer: Modified Independent    BED MOBILITY  Rolling: Modified Independent  Supine to Sit : Modified Independent  Scooting: independent    BALANCE ASSESSMENT  Static Sitting: Independent  Static Standing: Modified Independent  Standing Bilateral: Supervision    FUNCTIONAL ADL ASSESSMENT  LB Dressing Seated: Modified Independent  Toileting Seated: Modified Independent      ACTIVITY TOLERANCE: WFL.   BP taken:  Sitting 155/87  Standing 126/97    Pulse: 70  Heart Rate Source: Monitor  Resp: 18  BP: 155/87  BP Location: Right arm  BP Method:  Automatic  Patient Position: Sitting    O2 SATURATIONS  Oxygen Therapy  SPO2% on Room Air at Rest: 94    COGNITION  Overall Cognitive Status:  WFL - within functional limits  COGNITION ASSESSMENTS       Upper Extremity:   ROM: within functional limits   Strength: is within functional limits   Coordination:  Gross motor: WFL  Fine motor: WFL  Sensation: Light touch:  intact    EDUCATION PROVIDED  Patient: Role of Occupational Therapy; Plan of Care; Adaptive Equipment Recommendations; DME Recommendations; Energy Conservation; Compensatory ADL Techniques; Proper Body Mechanics  Patient's Response to Education: Verbalized Understanding    Equipment used: rolling walker, gait belt  Demonstrates functional use    Therapist comments: pleasant patient, agreeable to all presented tasks. Patient received standing exiting the restroom with a RN. Patient AO x4, able to follow multistep commands without difficulty. Patient educated on the role of OT, adaptive equipments for LB dressing, fall preventions strategies. Patient verbalized understanding.        Patient End of Session: Up in chair;Needs met;Call light within reach;RN aware of session/findings;All patient questions and concerns addressed;Alarm set    OCCUPATIONAL PROFILE    HOME SITUATION  Type of Home: Apartment  Home Layout: One level;Elevator  Lives With: Spouse    Toilet and Equipment: Standard height toilet  Shower/Tub and Equipment: Walk-in shower;Grab bar  Other Equipment: Sock aid    Occupation/Status: retired  Hand Dominance: Right  Drives: No  Patient Regularly Uses: Glasses    Prior Level of Function: typically patient is independent with self care and functional mobility. Patient owns a RW that he doesn't use. Patient stated that his wife wants him to use the walker to prevent him from falls, however, he stated that he falls due to passing out and RW won't help.    SUBJECTIVE  \"You sound like my wife\" when told to use the walker.     PAIN ASSESSMENT  Rating:  0  Location: denies       OBJECTIVE  Precautions: Bed/chair alarm;Hard of hearing  Fall Risk: Standard fall risk    WEIGHT BEARING RESTRICTION  Weight Bearing Restriction: None                AM-PAC ‘6-Clicks’ Inpatient Daily Activity Short Form  -   Putting on and taking off regular lower body clothing?: None  -   Bathing (including washing, rinsing, drying)?: A Little  -   Toileting, which includes using toilet, bedpan or urinal? : None  -   Putting on and taking off regular upper body clothing?: None  -   Taking care of personal grooming such as brushing teeth?: None  -   Eating meals?: None    AM-PAC Score:  Score: 23  Approx Degree of Impairment: 15.86%  Standardized Score (AM-PAC Scale): 51.12      ADDITIONAL TESTS     NEUROLOGICAL FINDINGS        PLAN   Patient has been evaluated and presents with no skilled Occupational Therapy needs at this time.  Patient discharged from Occupational Therapy services.  Please re-order if a new functional limitation presents during this admission.      Patient Evaluation Complexity Level:   Occupational Profile/Medical History LOW - Brief history including review of medical or therapy records    Specific performance deficits impacting engagement in ADL/IADL LOW  1 - 3 performance deficits    Client Assessment/Performance Deficits LOW - No comorbidities nor modifications of tasks    Clinical Decision Making MODERATE - Analysis of occupational profile, detailed assessments, several treatment options    Overall Complexity LOW     OT Session Time: 30 minutes  Self-Care Home Management: 15 minutes

## 2024-05-02 NOTE — PLAN OF CARE
Assumed patient care 0730  Patient alert and oriented X4, impulsive, forgetful at times   [place], bilateral hearing aids  On room air, VSS, NSR on tele  Denies pain  Patient is up with standby assist and walker to chair and bathroom, voiding, no BM this shift  Tolerating diet  PT/OT recommending outpatient PT  ECHO complete, paper preliminary results reviewed by MD and placed in patient chart  Patient cleared by all consults for discharge        NURSING DISCHARGE NOTE    All discharge documentation including AVS, follow ups, and medications reviewed with patient and spouse at bedside, verbalized understanding.  Signs and symptoms when to call 911 discussed with patient, verbalized understanding.   Discharged Home via Wheelchair.  Accompanied by RN and Spouse  Belongings Taken by patient/family.    Problem: NEUROLOGICAL - ADULT  Goal: Achieves stable or improved neurological status  Description: INTERVENTIONS  - Assess for and report changes in neurological status  - Initiate measures to prevent increased intracranial pressure  - Maintain blood pressure and fluid volume within ordered parameters to optimize cerebral perfusion and minimize risk of hemorrhage  - Monitor temperature, glucose, and sodium. Initiate appropriate interventions as ordered  Outcome: Progressing  Goal: Absence of seizures  Description: INTERVENTIONS  - Monitor for seizure activity  - Administer anti-seizure medications as ordered  - Monitor neurological status  Outcome: Progressing  Goal: Remains free of injury related to seizure activity  Description: INTERVENTIONS:  - Maintain airway, patient safety  and administer oxygen as ordered  - Monitor patient for seizure activity, document and report duration and description of seizure to MD/LIP  - If seizure occurs, turn patient to side and suction secretions as needed  - Reorient patient post seizure  - Seizure pads on all 4 side rails  - Instruct patient/family to notify RN of any seizure activity  -  Instruct patient/family to call for assistance with activity based on assessment  Outcome: Progressing  Goal: Achieves maximal functionality and self care  Description: INTERVENTIONS  - Monitor swallowing and airway patency with patient fatigue and changes in neurological status  - Encourage and assist patient to increase activity and self care with guidance from PT/OT  - Encourage visually impaired, hearing impaired and aphasic patients to use assistive/communication devices  Outcome: Progressing     Problem: PAIN - ADULT  Goal: Verbalizes/displays adequate comfort level or patient's stated pain goal  Description: INTERVENTIONS:  - Encourage pt to monitor pain and request assistance  - Assess pain using appropriate pain scale  - Administer analgesics based on type and severity of pain and evaluate response  - Implement non-pharmacological measures as appropriate and evaluate response  - Consider cultural and social influences on pain and pain management  - Manage/alleviate anxiety  - Utilize distraction and/or relaxation techniques  - Monitor for opioid side effects  - Notify MD/LIP if interventions unsuccessful or patient reports new pain  - Anticipate increased pain with activity and pre-medicate as appropriate  Outcome: Progressing

## 2024-05-02 NOTE — OCCUPATIONAL THERAPY NOTE
OCCUPATIONAL THERAPY EVALUATION - INPATIENT    Room Number: 7615/7615-A  Evaluation Date: 5/2/2024     Type of Evaluation: Initial  Presenting Problem: syncope    Physician Order: IP Consult to Occupational Therapy  Reason for Therapy:  ADL/IADL Dysfunction and Discharge Planning      OCCUPATIONAL THERAPY ASSESSMENT   Patient is a 79 year old male admitted on 5/1/2024 with Presenting Problem: syncope. Co-Morbidities : COPD, HTN, ILD, pulm HTN  Patient is currently functioning near baseline with self care and functional mobility.  Prior to admission, patient's baseline is mod I/supervision.  Patient met all OT goals at Mod I level.  Patient reports no further questions/concerns at this time.       CT BRAIN OR HEAD 5/1/24  CONCLUSION:  1. There is severe chronic small vessel ischemic change and atrophy. 2. Right mastoid effusion is noted. 3. There is otherwise no acute abnormality detected.     Patient will be discharged from OT services at this time. Will benefit from home at discharge from the hospital.         WEIGHT BEARING RESTRICTION  Weight Bearing Restriction: None                Recommendations for nursing staff:   Transfers: 1 person, rolling walker  Toileting location: Toilet    EVALUATION SESSION:  Patient at start of session: sanding by toilet with RN.   FUNCTIONAL TRANSFER ASSESSMENT  Sit to Stand: Edge of Bed  Edge of Bed: Modified Independent  Toilet Transfer: Modified Independent    BED MOBILITY  Rolling: Modified Independent  Supine to Sit : Modified Independent  Scooting: independent    BALANCE ASSESSMENT  Static Sitting: Independent  Static Standing: Modified Independent  Standing Bilateral: Modified Independent    FUNCTIONAL ADL ASSESSMENT  LB Dressing Seated: Modified Independent  Toileting Seated: Modified Independent      ACTIVITY TOLERANCE: WFL.   BP taken:  Sitting 155/87  Standing 126/97    Pulse: 70  Heart Rate Source: Monitor  Resp: 18  BP: 155/87  BP Location: Right arm  BP Method:  Automatic  Patient Position: Sitting    O2 SATURATIONS  Oxygen Therapy  SPO2% on Room Air at Rest: 94    COGNITION  Overall Cognitive Status:  WFL - within functional limits  COGNITION ASSESSMENTS       Upper Extremity:   ROM: within functional limits   Strength: is within functional limits   Coordination:  Gross motor: WFL  Fine motor: WFL  Sensation: Light touch:  intact    EDUCATION PROVIDED  Patient: Role of Occupational Therapy; Plan of Care; Adaptive Equipment Recommendations; DME Recommendations; Energy Conservation; Compensatory ADL Techniques; Proper Body Mechanics  Patient's Response to Education: Verbalized Understanding    Equipment used: rolling walker, gait belt  Demonstrates functional use    Therapist comments: pleasant patient, agreeable to all presented tasks. Patient received standing exiting the restroom with a RN. Patient AO x4, able to follow multistep commands without difficulty. Patient educated on the role of OT, adaptive equipments for LB dressing, fall preventions strategies. Patient verbalized understanding.        Patient End of Session: Up in chair;Needs met;Call light within reach;RN aware of session/findings;All patient questions and concerns addressed;Alarm set    OCCUPATIONAL PROFILE    HOME SITUATION  Type of Home: Apartment  Home Layout: One level;Elevator  Lives With: Spouse    Toilet and Equipment: Standard height toilet  Shower/Tub and Equipment: Walk-in shower;Grab bar  Other Equipment: Sock aid    Occupation/Status: retired  Hand Dominance: Right  Drives: No  Patient Regularly Uses: Glasses    Prior Level of Function: typically patient is independent with self care and functional mobility. Patient owns a RW that he doesn't use. Patient stated that his wife wants him to use the walker to prevent him from falls, however, he stated that he falls due to passing out and RW won't help.    SUBJECTIVE  \"You sound like my wife\" when told to use the walker.     PAIN ASSESSMENT  Rating:  0  Location: denies       OBJECTIVE  Precautions: Bed/chair alarm;Hard of hearing  Fall Risk: Standard fall risk    WEIGHT BEARING RESTRICTION  Weight Bearing Restriction: None                AM-PAC ‘6-Clicks’ Inpatient Daily Activity Short Form  -   Putting on and taking off regular lower body clothing?: None  -   Bathing (including washing, rinsing, drying)?: A Little  -   Toileting, which includes using toilet, bedpan or urinal? : None  -   Putting on and taking off regular upper body clothing?: None  -   Taking care of personal grooming such as brushing teeth?: None  -   Eating meals?: None    AM-PAC Score:  Score: 23  Approx Degree of Impairment: 15.86%  Standardized Score (AM-PAC Scale): 51.12      ADDITIONAL TESTS     NEUROLOGICAL FINDINGS        PLAN   Patient has been evaluated and presents with no skilled Occupational Therapy needs at this time.  Patient discharged from Occupational Therapy services.  Please re-order if a new functional limitation presents during this admission.      Patient Evaluation Complexity Level:   Occupational Profile/Medical History LOW - Brief history including review of medical or therapy records    Specific performance deficits impacting engagement in ADL/IADL LOW  1 - 3 performance deficits    Client Assessment/Performance Deficits LOW - No comorbidities nor modifications of tasks    Clinical Decision Making MODERATE - Analysis of occupational profile, detailed assessments, several treatment options    Overall Complexity LOW     OT Session Time: 30 minutes  Self-Care Home Management: 15 minutes

## 2024-05-02 NOTE — PHYSICAL THERAPY NOTE
PHYSICAL THERAPY EVALUATION - INPATIENT     Room Number: 7615/7615-A  Evaluation Date: 2024  Type of Evaluation: Initial  Physician Order: PT Eval and Treat    Presenting Problem: Syncope  Co-Morbidities : COPD, HTN, ILD, pulm HTN  Reason for Therapy: Mobility Dysfunction and Discharge Planning    PHYSICAL THERAPY ASSESSMENT   Patient is a 79 year old male admitted 2024 for syncope.   Patient is currently functioning at baseline with bed mobility, transfers, and gait. Prior to admission, patient's baseline is independent.     Patient will benefit from continued skilled PT Services at discharge to promote functional independence in home.  Anticipate patient will return home with OP PT.    PLAN  Patient has been evaluated and presents with no skilled Physical Therapy needs at this time.  Patient discharged from Physical Therapy services.  Please re-order if a new functional limitation presents during this admission.    GOALS  Patient was able to achieve the following goals ...    Patient was able to transfer At previous, functional level   Patient able to ambulate on level surfaces At previous, functional level         HOME SITUATION  Type of Home: Apartment   Home Layout: One level;Elevator                Lives With: Spouse  Drives: No  Patient Owned Equipment: Rolling walker;Cane  Patient Regularly Uses: Glasses    Prior Level of [place]: Pt typically independent with ADLs and mobility. Reports having a RW that his wife wants him to use, however he does not. Reports his falls are from passing out, not his balance or tripping.     SUBJECTIVE  \"Thanks for coming\"       OBJECTIVE  Precautions: Bed/chair alarm;Hard of hearing  Fall Risk: Standard fall risk    WEIGHT BEARING RESTRICTION  Weight Bearing Restriction: None                PAIN ASSESSMENT  [place]          COGNITION  Overall Cognitive Status:  WFL - within functional limits  Safety Judgement:  decreased awareness of need for  safety    RANGE OF MOTION AND STRENGTH ASSESSMENT  See OT note for UE assessment    Lower extremity ROM is within functional limits     Lower extremity strength is within functional limits   5/5 throughout     BALANCE  Static Sitting: Fair +  Dynamic Sitting: Fair +  Static Standing: Poor +  Dynamic Standing: Poor +    ADDITIONAL TESTS                                    ACTIVITY TOLERANCE         155/87 sitting  126/97 standing                O2 WALK       NEUROLOGICAL FINDINGS                        AM-PAC '6-Clicks' INPATIENT SHORT FORM - BASIC MOBILITY  How much difficulty does the patient currently have...  Patient Difficulty: Turning over in bed (including adjusting bedclothes, sheets and blankets)?: None   Patient Difficulty: Sitting down on and standing up from a chair with arms (e.g., wheelchair, bedside commode, etc.): A Little   Patient Difficulty: Moving from lying on back to sitting on the side of the bed?: None   How much help from another person does the patient currently need...   Help from Another: Moving to and from a bed to a chair (including a wheelchair)?: A Little   Help from Another: Need to walk in hospital room?: A Little   Help from Another: Climbing 3-5 steps with a railing?: A Little       AM-PAC Score:  Raw Score: 20   Approx Degree of Impairment: 35.83%   Standardized Score (AM-PAC Scale): 47.67   CMS Modifier (G-Code): CJ    FUNCTIONAL ABILITY STATUS  Gait Assessment   Functional Mobility/Gait Assessment  Gait Assistance: Contact guard assist;Supervision  Distance (ft): 250  Assistive Device: Rolling walker;None  Pattern: Within Functional Limits (mild anteropulsion of RW)    Skilled Therapy Provided     Bed Mobility:  Rolling: NT  Supine to sit: NT   Sit to supine: NT     Transfer Mobility:  Sit to stand: supervision   Stand to sit: supervision  Gait = supervision-CGA    Therapist's comments:RN cleared for session. Pt agreeable for therapy, received supine. Ortho BP readings as above.  Pt asymptomatic. Pt with increased unsteadiness when amb without RW, pt instructed on continued use of RW for optimal balance and fall prevention. Pt verbalizes understanding. Instructed to call for nursing staff for any needs and OOB mobility.     Exercise/Education Provided:  Body mechanics  Energy conservation  Functional activity tolerated  Gait training  Posture  Strengthening  Transfer training    Patient End of Session: Up in chair;Needs met;Call light within reach;RN aware of session/findings;All patient questions and concerns addressed;Alarm set;Discussed recommendations with /    Patient Evaluation Complexity Level:  History High - 3 or more personal factors and/or co-morbidities   Examination of body systems Low - addressing 1-2 elements   Clinical Presentation Low - Stable   Clinical Decision Making Low Complexity       PT Session Time: 25 minutes  Gait Training: 10 minutes  Therapeutic Activity: 5 minutes

## 2024-05-02 NOTE — CM/SW NOTE
Pt discussed with RN/PT. Recommendations for outpatient therapy, noted pt does not drive. Pt reports wife can take him to appointments. SW sent  referral for initial assessment/oversight. Notified RN.     KAROLINE Patel

## 2024-05-02 NOTE — DISCHARGE SUMMARY
[facility]ist Discharge Summary    Patient ID  Duke Garcia III  [id number]  79 year old  2/25/1945    Admit date: 5/1/2024    Discharge date: 5/2/2024      Attending: Keo Peterson MD     Primary Care Physician: Roberto Henderson MD      Reason for admission: falls / syncope    Discharge condition: stable    Disposition: home    Important follow up:  -PCP within 7 d  -specialists:      -labs:    -radiology:      Additional patient instructions       Discharge med list     Medication List        CONTINUE taking these medications      albuterol (5 MG/ML) 0.5% Nebu  Commonly known as: Ventolin     Budesonide-Formoterol Fumarate 160-4.5 MCG/ACT Aero  Commonly known as: SYMBICORT     celecoxib 200 MG Caps  Commonly known as: CeleBREX     cholecalciferol 50 MCG (2000 UT) Caps  Commonly known as: Vitamin D3     clobetasol 0.05 % Crea  Commonly known as: Temovate     escitalopram 10 MG Tabs  Commonly known as: Lexapro     fexofenadine 180 MG Tabs  Commonly known as: Allegra     finasteride 5 MG Tabs  Commonly known as: Proscar  TAKE 1 TABLET (5 MG TOTAL) BY MOUTH DAILY.     gabapentin 400 MG Caps  Commonly known as: Neurontin     montelukast 10 MG Tabs  Commonly known as: Singulair     Myrbetriq 50 MG Tb24  Generic drug: Mirabegron ER  Take 1 tablet (50 mg total) by mouth daily.     polyethylene glycol (PEG 3350) 17 g Pack  Commonly known as: Miralax     Tadalafil 20 MG Tabs  Commonly known as: Cialis  Take 1 tablet (20 mg total) by mouth daily as needed for Erectile Dysfunction.     tamsulosin 0.4 MG Caps  Commonly known as: Flomax  TAKE 1 CAPSULE BY MOUTH EVERY DAY     triamcinolone 55 MCG/ACT Aero  Commonly known as: Nasacort            STOP taking these medications      losartan 50 MG Tabs  Commonly known as: Cozaar              Discharge Diagnoses:  Syncope   Chronic copd wo exacerbation  Bph  Htn  R mastoid effusion    Consults:  IP CONSULT TO HOSPITALIST  IP CONSULT TO SOCIAL WORK    Radiology:  CT  BRAIN OR HEAD ([id number])    Result Date: 5/1/2024  PROCEDURE:  CT BRAIN OR HEAD ([id number])  COMPARISON:  None.  INDICATIONS:  falling, possible stroke, confusion x 2 weeks  TECHNIQUE:  Noncontrast CT scanning is performed through the brain. Dose reduction techniques were used. Dose information is transmitted to the ACR (American College of Radiology) NRDR (National Radiology Data Registry) which includes the Dose Index Registry.  PATIENT STATED HISTORY: (As transcribed by Technologist)  Three falls and syncopal episodes in past week. Confusion.    FINDINGS:  VENTRICLES/SULCI:  Ventricles and sulci are prominent compatible with atrophy. INTRACRANIAL:  There is multifocal and confluent decreased attenuation in periventricular and subcortical white matter which is consistent with chronic small vessel ischemic change.  There is no specific evidence of acute ischemia, hemorrhage or mass. SINUSES:           No sign of acute sinusitis.  MASTOIDS:          Partial opacification right mastoid air cells is noted consistent with mastoid effusion. SKULL:             No evidence for fracture or osseous abnormality. OTHER:             None.            CONCLUSION:  1. There is severe chronic small vessel ischemic change and atrophy. 2. Right mastoid effusion is noted. 3. There is otherwise no acute abnormality detected.    LOCATION:  [facility]   Dictated by (CST): Maykel Reyes MD on 5/01/2024 at 10:00 PM     Finalized by (CST): Maykel Reyes MD on 5/01/2024 at 10:03 PM         Operative reports:      Hospital course:    Patient is a 79 year old male with PMH sig for chronic COPD, HTN, ILD, pulm HTN presents for eval of syncope/falls.        # falls/syncope  - symptoms appear orthostatic. He does complain of dizziness when he stands up. Orthos VS neg here but could have been done after rehydration  Encouraged po hydration  Compression stockings prn  Takes flomax at home - to switch to nightly  Echo done - normal ef, no sig change from  prior (results in paper chart, not transferred to epic yet)  -CT head neg for acute findings but noted to have R mastoid effusion, no fevers / pain, fu with ENT as outpt, pt expxlained       # chronic COPD without exacerbation  - cont home inhaler regimen    # BPH  - cont flomax and finasteride    # HTN  - home meds at dc    Day of discharge exam:  Vitals:    05/02/24 1630   BP: (!) 141/92   Pulse: 68   Resp: 19   Temp: 98.4 °F (36.9 °C)     Feels well today, worked with PT  No nv  No cp    No acute distress, alert and oriented   Lungs clear  Heart regular  Abdomen benign    Total time coordinating care 32 min      Patient and/or family had opportunity to ask questions and expressed understanding and agreement with therapeutic plan as outlined         Tristan Menendez Hospitalist  [phone]  Answering Service: [phone]

## 2024-05-02 NOTE — CM/SW NOTE
[facility] accepted referral, agency reserved in aidin system and follow up requested.     Terrie Quijano RN Case Manager [phone]

## 2024-05-02 NOTE — ED PROVIDER NOTES
Patient Seen in: [facility]      History     Chief Complaint   Patient presents with    Syncope     Stated Complaint: falling, possible stroke, confusion x 2 weeks    Subjective:   HPI    79-year-old male history of COPD, hypertension presents to ED with report of 3 syncopal episodes of the last 2 months.  He reports his last syncopal episode was 1 week ago.  Patient denies chest pain, shortness of breath, vomiting, diarrhea, dizziness.  Patient denies warning signs prior to passing out.  He states he has been hitting his head.  He denies use of blood thinners.  Per note 4/17/2024 patient reported for dizziness and falls, had reported sinus pressure and diagnosed with sinusitis started on prednisone and amoxicillin.    Objective:   Past Medical History:    Arthritis    Asthma ([facility])    Back problem    Cataract    COPD (chronic obstructive pulmonary disease) ([facility])    Degenerative disc disease, cervical    Eczema    Esophageal reflux    Essential hypertension    Hearing impairment    bilateral hearing aids    High blood pressure    Interstitial lung disease (HCC)    Muscle weakness    Osteoarthritis    Osteopenia    Problems with swallowing    Pulmonary hypertension (HCC)    Scoliosis    Sleep apnea    cpap    Thoracic kyphosis    Visual impairment              Past Surgical History:   Procedure Laterality Date    Appendectomy      Hernia surgery      Hip replacement surgery Left     Repair ing hernia,5+y/o,reducibl      Spine surgery procedure unlisted  2019    lumbar fusion    Spine surgery procedure unlisted      LUMBAR SURGERY X 2                Social History     Socioeconomic History    Marital status:    Tobacco Use    Smoking status: Former     Types: Cigarettes    Smokeless tobacco: Never   Vaping Use    Vaping status: Never Used   Substance and Sexual Activity    Alcohol use: Yes     Alcohol/week: 14.0 standard drinks of alcohol     Types: 14 Standard drinks or equivalent  per week    Drug use: Never     Social Determinants of Health     Food Insecurity: No Food Insecurity (5/1/2024)    Food Insecurity     Food Insecurity: Never true   Transportation Needs: No Transportation Needs (5/1/2024)    Transportation Needs     Lack of Transportation: No   Housing Stability: Low Risk  (5/1/2024)    Housing Stability     Housing Instability: No              Review of Systems    Positive for stated complaint: falling, possible stroke, confusion x 2 weeks  Other systems are as noted in HPI.  Constitutional and vital signs reviewed.      All other systems reviewed and negative except as noted above.    Physical Exam     ED Triage Vitals [05/01/24 1551]   /82   Pulse 73   Resp 16   Temp 97.8 °F (36.6 °C)   Temp src Temporal   SpO2 96 %   O2 Device None (Room air)       Current:BP (!) 159/94 (BP Location: Left arm)   Pulse 68   Temp 97.8 °F (36.6 °C) (Oral)   Resp 15   Ht 165.1 cm (5' 5\")   Wt 86.2 kg   SpO2 94%   BMI 31.62 kg/m²         Physical Exam    Vital signs reviewed.  Nursing note reviewed.  Constitutional: Alert, well-appearing  Head: Normocephalic, atraumatic  Mouth: Moist  Eyes: Extraocular muscles intact, pupils equal  Cardiovascular: Regular rate and rhythm  Pulmonary: Effort normal, breath sounds normal  Abdomen: Soft, nontender nondistended  Skin: Warm and dry  Musculoskeletal range of motion grossly normal all extremities  Neuro: Alert, at baseline, no focal neuro deficit.  Face symmetric, speech clear.  Moves all extremities against gravity  Psych: Mood normal          ED Course     Labs Reviewed   COMP METABOLIC PANEL (14) - Abnormal; Notable for the following components:       Result Value    Glucose 106 (*)     All other components within normal limits   CBC W/ DIFFERENTIAL - Abnormal; Notable for the following components:    WBC 3.7 (*)     RBC 3.64 (*)     HCT 38.4 (*)     .0 (*)     .5 (*)     MCH 36.3 (*)     All other components within normal  limits   TROPONIN I HIGH SENSITIVITY - Normal   CBC WITH DIFFERENTIAL WITH PLATELET    Narrative:     The following orders were created for panel order CBC With Differential With Platelet.  Procedure                               Abnormality         Status                     ---------                               -----------         ------                     CBC W/ DIFFERENTIAL[[id number]]          Abnormal            Final result                 Please view results for these tests on the individual orders.   BASIC METABOLIC PANEL (8)   CBC WITH DIFFERENTIAL WITH PLATELET   RAINBOW DRAW LAVENDER   RAINBOW DRAW LIGHT GREEN   RAINBOW DRAW BLUE     EKG    Rate, intervals and axes as noted on EKG Report.  Rate: 71  Rhythm: Sinus Rhythm  Reading: No contiguous ST-T wave abnormality                 CT BRAIN OR HEAD ([id number])    Result Date: 5/1/2024  PROCEDURE:  CT BRAIN OR HEAD ([id number])  COMPARISON:  None.  INDICATIONS:  falling, possible stroke, confusion x 2 weeks  TECHNIQUE:  Noncontrast CT scanning is performed through the brain. Dose reduction techniques were used. Dose information is transmitted to the ACR (American College of Radiology) NRDR (National Radiology Data Registry) which includes the Dose Index Registry.  PATIENT STATED HISTORY: (As transcribed by Technologist)  Three falls and syncopal episodes in past week. Confusion.    FINDINGS:  VENTRICLES/SULCI:  Ventricles and sulci are prominent compatible with atrophy. INTRACRANIAL:  There is multifocal and confluent decreased attenuation in periventricular and subcortical white matter which is consistent with chronic small vessel ischemic change.  There is no specific evidence of acute ischemia, hemorrhage or mass. SINUSES:           No sign of acute sinusitis.  MASTOIDS:          Partial opacification right mastoid air cells is noted consistent with mastoid effusion. SKULL:             No evidence for fracture or osseous abnormality. OTHER:             None.             CONCLUSION:  1. There is severe chronic small vessel ischemic change and atrophy. 2. Right mastoid effusion is noted. 3. There is otherwise no acute abnormality detected.    LOCATION:  [facility]   Dictated by (CST): Maykel Reyes MD on 5/01/2024 at 10:00 PM     Finalized by (CST): Maykel Reyes MD on 5/01/2024 at 10:03 PM               [facility]      Medical Decision Making:    Differential diagnosis before testing includes syncope due to arrhythmia, dehydration, intracranial bleeding potential life threatening diagnosis which is a medical condition that poses a threat to life/function.     Comorbidities that add complexity to management: See HPI    I reviewed prior external ED notes including note from PCP a few days ago as discussed in HPI    Discussions of management with: Hospitalist    I personally reviewed the CT brain and my independent interpretation includes no acute abnormality    Shared decision making:  Admission disposition: 5/1/2024  7:03 PM         Labs unremarkable.  Patient has chronic thrombocytopenia stable to today.  Troponin negative, EKG nonischemic.  CT brain negative.  Orthostatics systolic dropped 10 points only.  Patient given IV fluids.  Patient will be admitted for syncope given multiple falls recently.                             Medical Decision Making      Disposition and Plan     Clinical Impression:  1. Syncope, unspecified syncope type         Disposition:  Admit  5/1/2024  7:03 pm    Follow-up:  No follow-up provider specified.        Medications Prescribed:  Current Discharge Medication List                            Hospital Problems       Present on Admission  Date Reviewed: 3/13/2024            ICD-10-CM Noted POA    * (Principal) Syncope, unspecified syncope type R55 5/1/2024 Unknown

## 2024-05-02 NOTE — PLAN OF CARE
Pt admitted from ED via cart around 2300  A&Ox4  Still complaining of dizziness. (-) orthostatics  1x assist with walker  Refused IVF  Echo needed to be done  PT/OT to see  Call light in reach  Bed in low position

## 2024-05-02 NOTE — ED QUICK NOTES
Orders for admission, patient is aware of plan and ready to go upstairs. Any questions, please call ED RN Anne-Marie at extension [phone].     Patient Covid vaccination status: Fully vaccinated     COVID Test Ordered in ED: None    COVID Suspicion at Admission: N/A    Running Infusions:  None    Mental Status/LOC at time of transport: alert    Other pertinent information: CPAP at night   CIWA score: N/A   NIH score:  N/A

## 2024-05-02 NOTE — H&P
[facility] Hospitalist History and Physical      Chief Complaint   Patient presents with    Syncope        PCP: Roberto Henderson MD      History of Present Illness: Patient is a 79 year old male with PMH sig for chronic COPD, HTN, ILD, pulm HTN presents for eval of syncope/falls.      Wife present at bedside in ER.     Episodes have occurred 3 times over the last two months.  Presented today bc his friend who is a referee suggested he get evaluated.  First episode occurred about two months ago.  Hx is vague but reports stood up, got lightheaded and passed out.  Wife nearby and he came to quickly. Did hit head.      Second two episodes seemed to have occurred while walking.  Inconsistent w fell or had LOC.  Denies chest pain or palpitations prior.  Denies HA or visions changes.  No LE edema.      No new meds.      Enrolled in pulm rehab.      Past Medical History:    Arthritis    Asthma (HCC)    Back problem    Cataract    COPD (chronic obstructive pulmonary disease) (HCC)    Degenerative disc disease, cervical    Eczema    Esophageal reflux    Essential hypertension    Hearing impairment    bilateral hearing aids    High blood pressure    Interstitial lung disease (HCC)    Muscle weakness    Osteoarthritis    Osteopenia    Problems with swallowing    Pulmonary hypertension (HCC)    Scoliosis    Sleep apnea    cpap    Thoracic kyphosis    Visual impairment      Past Surgical History:   Procedure Laterality Date    Appendectomy      Hernia surgery      Hip replacement surgery Left     Repair ing hernia,5+y/o,reducibl      Spine surgery procedure unlisted  2019    lumbar fusion    Spine surgery procedure unlisted      LUMBAR SURGERY X 2        ALL:  Allergies   Allergen Reactions    Lisinopril ANGIOEDEMA and UNKNOWN    Tramadol ANGIOEDEMA and UNKNOWN    Codeine UNKNOWN        Prior to Admission Medications   Medication Sig    TAMSULOSIN 0.4 MG Oral Cap TAKE 1 CAPSULE BY MOUTH EVERY DAY    FINASTERIDE 5 MG Oral Tab TAKE 1 TABLET  (5 MG TOTAL) BY MOUTH DAILY.    Mirabegron ER (MYRBETRIQ) 50 MG Oral Tablet 24 Hr Take 1 tablet (50 mg total) by mouth daily.    gabapentin 400 MG Oral Cap Take 2 capsules (800 mg total) by mouth 3 (three) times daily.    Tadalafil (CIALIS) 20 MG Oral Tab Take 1 tablet (20 mg total) by mouth daily as needed for Erectile Dysfunction.    fexofenadine 180 MG Oral Tab Take 1 tablet (180 mg total) by mouth daily.    Budesonide-Formoterol Fumarate 160-4.5 MCG/ACT Inhalation Aerosol Inhale 2 puffs into the lungs 2 (two) times daily.    montelukast 10 MG Oral Tab Take 1 tablet (10 mg total) by mouth nightly.    celecoxib 200 MG Oral Cap Take 1 capsule (200 mg total) by mouth 2 (two) times daily.    losartan 50 MG Oral Tab Take by mouth daily.    escitalopram 10 MG Oral Tab Take 1 tablet (10 mg total) by mouth daily.    triamcinolone 55 MCG/ACT Nasal Aerosol by Nasal route daily.    clobetasol 0.05 % External Cream Apply topically 2 (two) times daily.    polyethylene glycol, PEG 3350, 17 g Oral Powd Pack Take 17 g by mouth daily as needed.    cholecalciferol 50 MCG (2000 UT) Oral Cap Take 500 Units by mouth daily.    albuterol (5 MG/ML) 0.5% Inhalation Nebu Soln Take 0.5 mL (2.5 mg total) by nebulization every 6 (six) hours as needed for Wheezing.       Social History     Tobacco Use    Smoking status: Former     Types: Cigarettes    Smokeless tobacco: Never   Substance Use Topics    Alcohol use: Yes     Alcohol/week: 14.0 standard drinks of alcohol     Types: 14 Standard drinks or equivalent per week        Fam Hx  History reviewed. No pertinent family history.    Review of Systems  Comprehensive ROS reviewed and negative except for what is stated in HPI.      OBJECTIVE:  /82   Pulse 66   Temp 97.8 °F (36.6 °C) (Temporal)   Resp 18   Ht 5' 5\" (1.651 m)   Wt 190 lb (86.2 kg)   SpO2 96%   BMI 31.62 kg/m²   General:  Alert, no distress, appears stated age.    Head:  Normocephalic, without obvious abnormality,  atraumatic.   Eyes:  Sclera anicteric, No conjunctival pallor, EOMs intact.    Nose: Nares normal. Septum midline. Mucosa normal. No drainage.   Throat: Lips, mucosa, and tongue normal. Teeth and gums normal.   Neck: Supple, symmetrical, trachea midline, no cervical or supraclavicular lymph adenopathy, thyroid: no enlargment/tenderness/nodules appreciated   Lungs:   Clear to auscultation bilaterally. Normal effort   Chest wall:  No tenderness or deformity.   Heart:  Regular rate and rhythm, S1, S2 normal, no murmur, rub or gallop appreciated   Abdomen:   Soft, non-tender. Bowel sounds normal. No masses,  No organomegaly. Non distended   Extremities: Extremities normal, atraumatic, no cyanosis or edema.   Skin: Skin color, texture, turgor normal. No rashes or lesions.    Neurologic: Normal strength, no focal deficit appreciated     Data Review:    LABS:   Lab Results   Component Value Date    WBC 3.7 05/01/2024    HGB 13.2 05/01/2024    HCT 38.4 05/01/2024    .0 05/01/2024    CREATSERUM 0.86 05/01/2024    BUN 11 05/01/2024     05/01/2024    K 4.5 05/01/2024     05/01/2024    CO2 28.0 05/01/2024     05/01/2024    CA 8.9 05/01/2024    ALB 3.6 05/01/2024    ALKPHO 74 05/01/2024    BILT 1.6 05/01/2024    TP 6.5 05/01/2024    AST 23 05/01/2024    ALT 16 05/01/2024       CXR: image personally reviewed.      Radiology: CT BRAIN OR HEAD ([id number])    Result Date: 5/1/2024  PROCEDURE:  CT BRAIN OR HEAD ([id number])  COMPARISON:  None.  INDICATIONS:  falling, possible stroke, confusion x 2 weeks  TECHNIQUE:  Noncontrast CT scanning is performed through the brain. Dose reduction techniques were used. Dose information is transmitted to the ACR (American College of Radiology) NRDR (National Radiology Data Registry) which includes the Dose Index Registry.  PATIENT STATED HISTORY: (As transcribed by Technologist)  Three falls and syncopal episodes in past week. Confusion.    FINDINGS:  VENTRICLES/SULCI:   Ventricles and sulci are prominent compatible with atrophy. INTRACRANIAL:  There is multifocal and confluent decreased attenuation in periventricular and subcortical white matter which is consistent with chronic small vessel ischemic change.  There is no specific evidence of acute ischemia, hemorrhage or mass. SINUSES:           No sign of acute sinusitis.  MASTOIDS:          Partial opacification right mastoid air cells is noted consistent with mastoid effusion. SKULL:             No evidence for fracture or osseous abnormality. OTHER:             None.            CONCLUSION:  1. There is severe chronic small vessel ischemic change and atrophy. 2. Right mastoid effusion is noted. 3. There is otherwise no acute abnormality detected.    LOCATION:  [facility]   Dictated by (CST): Maykel Reyes MD on 5/01/2024 at 10:00 PM     Finalized by (CST): Maykel Reyes MD on 5/01/2024 at 10:03 PM          Assessment/Plan:     # falls/syncope  - unclear if true syncope or mechanical falls.  Will obtain echo, watch on tele.  PT eval  - check orthostatics  - consider dose decrease gabapentin    # chronic COPD without exacerbation  - cont home inhaler regimen    # BPH  - cont flomax and finasteride    # HTN  - losartan    scds      Outpatient records or previous hospital records reviewed.   [facility] hospitalist to continue to follow patient while in house  A total of 75  minutes taken with patient and coordinating care.  Greater than 50% face to face encounter.      Richard Peterson MD  [facility] Hospitalist  [phone]

## 2024-05-03 NOTE — PAYOR COMM NOTE
Discharge Notification    Patient Name: LIZZY USHA GALEANO  Payor: [employer]  Subscriber #: [id number]  Authorization Number: N/A  Admit Date/Time: 5/1/2024 4:31 PM  Discharge Date/Time: 5/2/2024 6:22 PM

## 2024-05-03 NOTE — HOME CARE LIAISON
[facility] SW spoke w/ patient who is agreeable with [facility]. Contact information placed on AVS. Unable to reach the patient.  Will continue to follow.

## 2024-06-03 DIAGNOSIS — N39.41 URGE INCONTINENCE: ICD-10-CM

## 2024-06-03 RX ORDER — SOLIFENACIN SUCCINATE 10 MG/1
10 TABLET, FILM COATED ORAL DAILY
Qty: 90 TABLET | Refills: 3 | OUTPATIENT
Start: 2024-06-03

## 2024-06-03 NOTE — TELEPHONE ENCOUNTER
Pt's last OV was on 3/13/24.  Pt no longer taking below medication.   This encounter is completed.

## 2024-07-26 ENCOUNTER — TELEPHONE (OUTPATIENT)
Dept: SURGERY | Facility: CLINIC | Age: 79
End: 2024-07-26

## 2024-07-26 DIAGNOSIS — N39.41 URGE INCONTINENCE: ICD-10-CM

## 2024-07-26 RX ORDER — MIRABEGRON 50 MG/1
50 TABLET, EXTENDED RELEASE ORAL DAILY
Qty: 90 TABLET | Refills: 0 | Status: SHIPPED | OUTPATIENT
Start: 2024-07-26

## 2024-09-05 NOTE — PROGRESS NOTES
HPI:     Duke Garcia III is a 79 year old male with a PMH of HTN, ILD, MAL, pulm HTN, asthma, scoliosis, DDD, OA, eczema.    Following for:  1. BPH/LUTS  - flomax for years, proscar 12/14/22  2. OAB/UI and fecal urgency  - myrbetriq 3/13/24  - using depends since 2021, Kegels reviewed  - oxybutynin was discussed by prior urologist but he didn't try  3. Prostate nodule  - stable for many years, noted during first exam with prior Urologist and no bx or MRI done  4. h/o gross hematuria and blood in stool  - s/p neg cysto and GI scope in 2016  - no recurrence  5. S/p left hydrocelectomy ~ 2015    PCP - [facility]  Prior Urologist - Kirill Villalobos ([place] - retired)  LOV 3/13/2024    Presents for check-up and wife is here today.  He is taking flomax, proscar, now myrbetriq and has stopped 10 mg vesicare. Myrbetriq helped for about 2 weeks and now symptoms are bothersome again.  In PT 2 x per week for a couple falls. Lives at home. Usually walks with walker for longer distances.    AUA SS is 30/35 with 4-5 for everything. LOV was 11/35 with 3 CLAUDE; 2 n, s; 1 w, u, I, f. Was 21/35 prior to myrbetriq with 5 n; 4 f, CLAUDE; 3 w, u; 2 I. Was 20/35 prior to proscar with 5 u; 4 f; 3 I, CLAUDE; 2 n, w; 1 s. Mostly happy with LUTS.  Incontinence: back to 2 PPD. Was just 1 PPD and often dry. Prior to myrbetriq had 2-3 depends per day, typically just damp. He is doing Kegels but not regularly. Has not made PFT appt.  Penoscrotal LOV: no abnormalities  ROSE LOV: ~ 2 cm nodule noted at apex, no tenderness    UA is negative and PVR is 142 mL - was 229, 115 mL LOV, 70, 121 mL prior.    Reported ~ 30-40 oz water, 20 coffee, 2 glasses wine qhs with light yellow urine.    PSA 0.855 1/25/24    Has poor potency and prefers observation    We again discussed PFT as option for both bladder and bowel urgency and mixed urinary incontinence. He wants to try this.    Also discussed option to try nightly CIC to see if this helps him empty better and he  declines.    Discussed UDS and office cysto for further evaluation of severe LUTS and he wants to do this. Does not want to resume vesicare as he doesn't think this helped.    Discussed that he may be a candidate for TURP but this may make incontinence worse. We may consider this if he is able to get incontinence under better control.    Continue increased water and cut back on grapes, wine for OAB and exercise more. Continue flomax, proscar. Continue 50 mg myrbetriq. Wife will help get him scheduled for PFT appt. Observation for ED. Pt requests obesity clinic referral. Plan for UDS and office cysto.    I spent over 40 minutes in consultation and coordination of this patient's care today including review of pertinent labs, imaging, records with > 50% of time spent counseling.      Prior note:  Was doing well up until about a week ago and started having a lot of frequency, nocturia.     Reports 2-3 y h/o OAB/LUTS which is worsening.    UTI hx: none  Diarrhea/constipation: intermittently both  Gross hematuria: several y ago had blood in stool and urine (had neg eval)  Tobacco hx: 20 pack years, quit 2015  Kidney stone hx: none  Fam h/o  malignancy: none    We reviewed cystitis tx and prevention strategies at today's visit and I provided educational materials for this. I encouraged the patient to drink at least 40-60 oz water per day or enough to keep urine clear. I also reviewed dietary irritants and provided educational materals for this. We also discussed trying prn AZO for pain relief with plenty of water.    We discussed Kegel exercises for incontinence. I provided and reviewed educational materials for this. He may also be interested in PFT later but wife has cardiac issues so probably won't do this right now.    HISTORY:  Past Medical History:    Arthritis    Asthma ([facility])    Back problem    Cataract    COPD (chronic obstructive pulmonary disease) ([facility])    Degenerative disc disease, cervical    Eczema     Esophageal reflux    Essential hypertension    Hearing impairment    bilateral hearing aids    High blood pressure    Interstitial lung disease (HCC)    Muscle weakness    Osteoarthritis    Osteopenia    Problems with swallowing    Pulmonary hypertension (HCC)    Scoliosis    Sleep apnea    cpap    Thoracic kyphosis    Visual impairment      Past Surgical History:   Procedure Laterality Date    Appendectomy      Hernia surgery      Hip replacement surgery Left     Repair ing hernia,5+y/o,reducibl      Spine surgery procedure unlisted  2019    lumbar fusion    Spine surgery procedure unlisted      LUMBAR SURGERY X 2      No family history on file.   Social History:   Social History     Socioeconomic History    Marital status:    Tobacco Use    Smoking status: Former     Types: Cigarettes    Smokeless tobacco: Never   Vaping Use    Vaping status: Never Used   Substance and Sexual Activity    Alcohol use: Yes     Alcohol/week: 14.0 standard drinks of alcohol     Types: 14 Standard drinks or equivalent per week    Drug use: Never     Social Determinants of Health     Food Insecurity: No Food Insecurity (5/1/2024)    Food Insecurity     Food Insecurity: Never true   Transportation Needs: No Transportation Needs (5/1/2024)    Transportation Needs     Lack of Transportation: No   Housing Stability: Low Risk  (5/1/2024)    Housing Stability     Housing Instability: No        Medications (Active prior to today's visit):  Current Outpatient Medications   Medication Sig Dispense Refill    MULTIPLE VITAMIN OR Take by mouth As Directed.      Cholecalciferol (VITAMIN D-3 OR) 1 - 2000 MG Tab      azithromycin 250 MG Oral Tab TAKE 2 TABLETS BY MOUTH TODAY, THEN TAKE 1 TABLET DAILY FOR 4 DAYS AS DIRECTED      buPROPion 75 MG Oral Tab Take 1 tablet (75 mg total) by mouth daily.      hydrocortisone 2.5 % External Cream APPLY TO AFFECTED AREA ON FACE AND EAR TWICE A DAY, TILL RESOLVED      predniSONE 20 MG Oral Tab Take 1 tablet  (20 mg total) by mouth daily.      famotidine 20 MG Oral Tab Take 1 tablet (20 mg total) by mouth 2 (two) times daily.      Mirabegron ER (MYRBETRIQ) 50 MG Oral Tablet 24 Hr Take 1 tablet (50 mg total) by mouth daily. 90 tablet 0    TAMSULOSIN 0.4 MG Oral Cap TAKE 1 CAPSULE BY MOUTH EVERY DAY 90 capsule 5    FINASTERIDE 5 MG Oral Tab TAKE 1 TABLET (5 MG TOTAL) BY MOUTH DAILY. 90 tablet 6    gabapentin 400 MG Oral Cap Take 2 capsules (800 mg total) by mouth 3 (three) times daily.      Tadalafil (CIALIS) 20 MG Oral Tab Take 1 tablet (20 mg total) by mouth daily as needed for Erectile Dysfunction. 30 tablet 5    fexofenadine 180 MG Oral Tab Take 1 tablet (180 mg total) by mouth daily.      Budesonide-Formoterol Fumarate 160-4.5 MCG/ACT Inhalation Aerosol Inhale 2 puffs into the lungs 2 (two) times daily.      montelukast 10 MG Oral Tab Take 1 tablet (10 mg total) by mouth nightly.      celecoxib 200 MG Oral Cap Take 1 capsule (200 mg total) by mouth 2 (two) times daily.      escitalopram 10 MG Oral Tab Take 2 tablets (20 mg total) by mouth daily.      triamcinolone 55 MCG/ACT Nasal Aerosol by Nasal route daily.      clobetasol 0.05 % External Cream Apply topically 2 (two) times daily.      polyethylene glycol, PEG 3350, 17 g Oral Powd Pack Take 17 g by mouth daily as needed.      cholecalciferol 50 MCG (2000 UT) Oral Cap Take 500 Units by mouth daily.      albuterol (5 MG/ML) 0.5% Inhalation Nebu Soln Take 0.5 mL (2.5 mg total) by nebulization every 6 (six) hours as needed for Wheezing.         Allergies:  Allergies   Allergen Reactions    Lisinopril ANGIOEDEMA and UNKNOWN    Tramadol ANGIOEDEMA and UNKNOWN    Codeine UNKNOWN         ROS:     A comprehensive 10 point review of systems was completed.  Pertinent positives and negatives noted in the the HPI.    PHYSICAL EXAM:     GENERAL APPEARANCE: well, developed, well nourished, in no acute distress  NEUROLOGIC: nonfocal, alert and oriented  HEAD: normocephalic,  atraumatic  EYES: sclera non-icteric  EARS: hearing intact  ORAL CAVITY: mucosa moist  NECK/THYROID: no obvious goiter or masses  LUNGS: nonlabored breathing  ABDOMEN: soft, no obvious masses or tenderness  SKIN: no obvious rashes    : as noted above    ASSESSMENT/PLAN:   Diagnoses and all orders for this visit:    Urine finding  -     POC Urinalysis, Automated Dip without microscopy (PCA and [facility] ONLY) [34845]    Urge incontinence    BPH with obstruction/lower urinary tract symptoms    Erectile dysfunction, unspecified erectile dysfunction type    Mixed incontinence urge and stress  -     PELVIC FLOOR THERAPY - INTERNAL  -     SPECIALTY (OTHER) - EXTERNAL    Gross hematuria    Obesity, unspecified classification, unspecified obesity type, unspecified whether serious comorbidity present  -     [facility] - [place] Location    - as noted above.    Thanks again for this consult.    Franki Palma MD, FACS  Urologist  [facility]  Office: [phone]

## 2024-09-13 ENCOUNTER — OFFICE VISIT (OUTPATIENT)
Dept: SURGERY | Facility: CLINIC | Age: 79
End: 2024-09-13
Payer: COMMERCIAL

## 2024-09-13 DIAGNOSIS — N52.9 ERECTILE DYSFUNCTION, UNSPECIFIED ERECTILE DYSFUNCTION TYPE: ICD-10-CM

## 2024-09-13 DIAGNOSIS — N39.46 MIXED INCONTINENCE URGE AND STRESS: ICD-10-CM

## 2024-09-13 DIAGNOSIS — N13.8 BPH WITH OBSTRUCTION/LOWER URINARY TRACT SYMPTOMS: ICD-10-CM

## 2024-09-13 DIAGNOSIS — E66.9 OBESITY, UNSPECIFIED CLASSIFICATION, UNSPECIFIED OBESITY TYPE, UNSPECIFIED WHETHER SERIOUS COMORBIDITY PRESENT: ICD-10-CM

## 2024-09-13 DIAGNOSIS — N39.41 URGE INCONTINENCE: ICD-10-CM

## 2024-09-13 DIAGNOSIS — N40.1 BPH WITH OBSTRUCTION/LOWER URINARY TRACT SYMPTOMS: ICD-10-CM

## 2024-09-13 DIAGNOSIS — R82.90 URINE FINDING: Primary | ICD-10-CM

## 2024-09-13 DIAGNOSIS — R31.0 GROSS HEMATURIA: ICD-10-CM

## 2024-09-13 LAB
APPEARANCE: CLEAR
BILIRUBIN: NEGATIVE
GLUCOSE (URINE DIPSTICK): NEGATIVE MG/DL
KETONES (URINE DIPSTICK): NEGATIVE MG/DL
LEUKOCYTES: NEGATIVE
MULTISTIX LOT#: NORMAL NUMERIC
NITRITE, URINE: NEGATIVE
OCCULT BLOOD: NEGATIVE
PH, URINE: 7 (ref 4.5–8)
PROTEIN (URINE DIPSTICK): NEGATIVE MG/DL
SPECIFIC GRAVITY: 1.01 (ref 1–1.03)
URINE-COLOR: YELLOW
UROBILINOGEN,SEMI-QN: 0.2 MG/DL (ref 0–1.9)

## 2024-09-13 PROCEDURE — 51798 US URINE CAPACITY MEASURE: CPT | Performed by: UROLOGY

## 2024-09-13 PROCEDURE — 99215 OFFICE O/P EST HI 40 MIN: CPT | Performed by: UROLOGY

## 2024-09-13 PROCEDURE — 1160F RVW MEDS BY RX/DR IN RCRD: CPT | Performed by: UROLOGY

## 2024-09-13 PROCEDURE — 81003 URINALYSIS AUTO W/O SCOPE: CPT | Performed by: UROLOGY

## 2024-09-13 PROCEDURE — 1159F MED LIST DOCD IN RCRD: CPT | Performed by: UROLOGY

## 2024-09-13 RX ORDER — HYDROCORTISONE 2.5 %
CREAM (GRAM) TOPICAL
COMMUNITY
Start: 2024-08-08

## 2024-09-13 RX ORDER — AZITHROMYCIN 250 MG/1
TABLET, FILM COATED ORAL
COMMUNITY

## 2024-09-13 RX ORDER — PREDNISONE 20 MG/1
1 TABLET ORAL DAILY
COMMUNITY

## 2024-09-13 RX ORDER — BUPROPION HYDROCHLORIDE 75 MG/1
1 TABLET ORAL DAILY
COMMUNITY
Start: 2024-06-03

## 2024-09-13 RX ORDER — FAMOTIDINE 20 MG/1
20 TABLET, FILM COATED ORAL 2 TIMES DAILY
COMMUNITY

## 2024-09-16 NOTE — PROGRESS NOTES
HPI:     Duke Garcia III is a 79 year old male with a PMH of HTN, ILD, MAL, pulm HTN, asthma, scoliosis, DDD, OA, eczema.    Following for:  1. BPH/LUTS  - flomax for years, proscar 12/14/22  2. OAB/UI and fecal urgency  - myrbetriq 3/13/24  - using depends since 2021, Kegels reviewed  - vesicare didn't help  - oxybutynin was discussed by prior urologist but he didn't try  3. Prostate nodule  - stable for many years, noted during first exam with prior Urologist and no bx or MRI done  4. h/o gross hematuria and blood in stool  - s/p neg cysto and GI scope in 2016  - no recurrence  5. S/p left hydrocelectomy ~ 2015    PCP - [facility]  Prior Urologist - Kirill Villalobos ([place] - retired)  LOV 9/13/2024    Presents for check-up, review UDS, office cysto, discuss next steps. Wife is here today.  He is taking flomax, proscar, stopped myrbetriq a couple days and stopped 10 mg vesicare a few mo ago. Myrbetriq helped for about 2 weeks and now symptoms are bothersome again.  In PT 2 x per week for a couple falls. Lives at home. Usually walks with walker for longer distances.    AUA SS is 27/35 with 5 u, CLAUDE; 4 n, w, f; 3 I; 2 s. LOV was 11/35 with 3 CLAUDE; 2 n, s; 1 w, u, I, f. Was 21/35 prior to myrbetriq with 5 n; 4 f, CLAUDE; 3 w, u; 2 I. Was 20/35 prior to proscar with 5 u; 4 f; 3 I, CLAUDE; 2 n, w; 1 s. Mostly happy with LUTS.  Incontinence: back to 2 PPD. Was just 1 PPD and often dry. Prior to myrbetriq had 2-3 depends per day, typically just damp. He is doing Kegels but not regularly. Has not made PFT appt.  Penoscrotal LOV: no abnormalities  ROSE LOV: ~ 2 cm nodule noted at apex, no tenderness    UA is negative and PVR was 142, 229, 115 mL LOV, 70, 121 mL prior.    Reported ~ 30-40 oz water, 20 coffee, 2 glasses wine qhs with light yellow urine. Reviewed dietary irritants for OAB today.    PSA 0.855 1/25/24    Has poor potency and prefers observation    We again discussed PFT as option for both bladder and bowel urgency and  mixed urinary incontinence. He wants to try this.    UDS today: first sensation 95 mL, first desire 130 mL, strong desire 164 mL, max bladder capacity of 164 mL. No leakage with valsalva. The patient was able to void with max detrusor pressure of 96 cm H2O and 0 mL PVR.    Cysto today: very mild BPH with wide open bladder neck.    Discussed UDS and office cysto show small capacity bladder and very high detrusor pressures c/w OAB with likely partial RAY.    Discussed that he would probably not be a good a candidate for TURP at this time as bladder neck is wide open and TURP may make incontinence worse.    Have discussed option to try nightly CIC to see if this helps with nocturia and he is open to trying this. CIC teaching performed today and he will try this once nightly.    Discussed resuming vesicare with myrbetriq for persistent OAB and he wants to try. Reviewed SEs.    Continue increased water and cut back on grapes, wine, coffee for OAB and exercise more. Continue flomax, proscar. Continue 50 mg myrbetriq and resuming vesicare. Starting PFT Observation for ED. Starting nightly CIC with option to incease. Fu in 6 mo for check-up with PVR.    I spent over 40 minutes in consultation and coordination of this patient's care today including review of pertinent labs, imaging, records with > 50% of time spent counseling - in addition to time spent performing cystoscopy.    PROCEDURE NOTE    PROCEDURE PERFORMED: Flexible Cystoscopy    After informed consent and urinalysis was obtained, he was placed in the supine position and prepped and draped in the usual sterile fashion using Betadine. Local anesthesia was induced by the introduction of 2% Lidocaine jelly per urethra.  A 16 Chinese flexible scope was passed through the anterior urethra, the posterior urethra and prostate were negotiated and the bladder was entered. The entirety of the bladder was examined and the scope was retroflexed to examine the bladder neck.      Findings: as noted above    The patient tolerated the procedure well, suffered no complications, was able to void spontaneously after completion of the procedure in the office, and left the office in good condition.    Nataly-procedural antibiotics were given.  _________________________________    Prior note:  Was doing well up until about a week ago and started having a lot of frequency, nocturia.     Reports 2-3 y h/o OAB/LUTS which is worsening.    UTI hx: none  Diarrhea/constipation: intermittently both  Gross hematuria: several y ago had blood in stool and urine (had neg eval)  Tobacco hx: 20 pack years, quit 2015  Kidney stone hx: none  Fam h/o  malignancy: none    We reviewed cystitis tx and prevention strategies at today's visit and I provided educational materials for this. I encouraged the patient to drink at least 40-60 oz water per day or enough to keep urine clear. I also reviewed dietary irritants and provided educational materals for this. We also discussed trying prn AZO for pain relief with plenty of water.    We discussed Kegel exercises for incontinence. I provided and reviewed educational materials for this. He may also be interested in PFT later but wife has cardiac issues so probably won't do this right now.    HISTORY:  Past Medical History:    Arthritis    Asthma ([facility])    Back problem    Cataract    COPD (chronic obstructive pulmonary disease) ([facility])    Degenerative disc disease, cervical    Eczema    Esophageal reflux    Essential hypertension    Hearing impairment    bilateral hearing aids    High blood pressure    Interstitial lung disease (HCC)    Muscle weakness    Osteoarthritis    Osteopenia    Problems with swallowing    Pulmonary hypertension ([facility])    Scoliosis    Sleep apnea    cpap    Thoracic kyphosis    Visual impairment      Past Surgical History:   Procedure Laterality Date    Appendectomy      Hernia surgery      Hip replacement surgery Left     Repair ing  hernia,5+y/o,reducibl      Spine surgery procedure unlisted  2019    lumbar fusion    Spine surgery procedure unlisted      LUMBAR SURGERY X 2      No family history on file.   Social History:   Social History     Socioeconomic History    Marital status:    Tobacco Use    Smoking status: Former     Types: Cigarettes    Smokeless tobacco: Never   Vaping Use    Vaping status: Never Used   Substance and Sexual Activity    Alcohol use: Yes     Alcohol/week: 14.0 standard drinks of alcohol     Types: 14 Standard drinks or equivalent per week    Drug use: Never     Social Determinants of Health     Food Insecurity: No Food Insecurity (5/1/2024)    Food Insecurity     Food Insecurity: Never true   Transportation Needs: No Transportation Needs (5/1/2024)    Transportation Needs     Lack of Transportation: No   Housing Stability: Low Risk  (5/1/2024)    Housing Stability     Housing Instability: No        Medications (Active prior to today's visit):  Current Outpatient Medications   Medication Sig Dispense Refill    finasteride 5 MG Oral Tab Take 1 tablet (5 mg total) by mouth daily. 90 tablet 6    Mirabegron ER (MYRBETRIQ) 50 MG Oral Tablet 24 Hr Take 1 tablet (50 mg total) by mouth daily. 90 tablet 5    tamsulosin 0.4 MG Oral Cap Take 1 capsule (0.4 mg total) by mouth daily. 90 capsule 5    Solifenacin Succinate 10 MG Oral Tab Take 1 tablet (10 mg total) by mouth daily. 90 tablet 5    MULTIPLE VITAMIN OR Take by mouth As Directed.      buPROPion 75 MG Oral Tab Take 1 tablet (75 mg total) by mouth daily.      hydrocortisone 2.5 % External Cream APPLY TO AFFECTED AREA ON FACE AND EAR TWICE A DAY, TILL RESOLVED      predniSONE 20 MG Oral Tab Take 1 tablet (20 mg total) by mouth daily.      famotidine 20 MG Oral Tab Take 1 tablet (20 mg total) by mouth 2 (two) times daily.      gabapentin 400 MG Oral Cap Take 2 capsules (800 mg total) by mouth 3 (three) times daily.      Tadalafil (CIALIS) 20 MG Oral Tab Take 1 tablet  (20 mg total) by mouth daily as needed for Erectile Dysfunction. 30 tablet 5    fexofenadine 180 MG Oral Tab Take 1 tablet (180 mg total) by mouth daily.      Budesonide-Formoterol Fumarate 160-4.5 MCG/ACT Inhalation Aerosol Inhale 2 puffs into the lungs 2 (two) times daily.      montelukast 10 MG Oral Tab Take 1 tablet (10 mg total) by mouth nightly.      celecoxib 200 MG Oral Cap Take 1 capsule (200 mg total) by mouth 2 (two) times daily.      escitalopram 10 MG Oral Tab Take 2 tablets (20 mg total) by mouth daily.      triamcinolone 55 MCG/ACT Nasal Aerosol by Nasal route daily.      clobetasol 0.05 % External Cream Apply topically 2 (two) times daily.      polyethylene glycol, PEG 3350, 17 g Oral Powd Pack Take 17 g by mouth daily as needed.      cholecalciferol 50 MCG (2000 UT) Oral Cap Take 500 Units by mouth daily.      albuterol (5 MG/ML) 0.5% Inhalation Nebu Soln Take 0.5 mL (2.5 mg total) by nebulization every 6 (six) hours as needed for Wheezing.      azithromycin 250 MG Oral Tab TAKE 2 TABLETS BY MOUTH TODAY, THEN TAKE 1 TABLET DAILY FOR 4 DAYS AS DIRECTED (Patient not taking: Reported on 9/24/2024)         Allergies:  Allergies   Allergen Reactions    Lisinopril ANGIOEDEMA and UNKNOWN    Tramadol ANGIOEDEMA and UNKNOWN    Codeine UNKNOWN         ROS:     A comprehensive 10 point review of systems was completed.  Pertinent positives and negatives noted in the the HPI.    PHYSICAL EXAM:     GENERAL APPEARANCE: well, developed, well nourished, in no acute distress  NEUROLOGIC: nonfocal, alert and oriented  HEAD: normocephalic, atraumatic  EYES: sclera non-icteric  EARS: hearing intact  ORAL CAVITY: mucosa moist  NECK/THYROID: no obvious goiter or masses  LUNGS: nonlabored breathing  ABDOMEN: soft, no obvious masses or tenderness  SKIN: no obvious rashes    : as noted above    ASSESSMENT/PLAN:   Diagnoses and all orders for this visit:    Urge incontinence  -     sulfamethoxazole-trimethoprim DS (Bactrim  DS) 800-160 MG per tab 1 tablet  -     Mirabegron ER (MYRBETRIQ) 50 MG Oral Tablet 24 Hr; Take 1 tablet (50 mg total) by mouth daily.  -     Solifenacin Succinate 10 MG Oral Tab; Take 1 tablet (10 mg total) by mouth daily.    BPH with obstruction/lower urinary tract symptoms  -     finasteride 5 MG Oral Tab; Take 1 tablet (5 mg total) by mouth daily.  -     tamsulosin 0.4 MG Oral Cap; Take 1 capsule (0.4 mg total) by mouth daily.    Erectile dysfunction, unspecified erectile dysfunction type    Mixed incontinence urge and stress  -     CYSTOURETHROSCOPY  -     CYSTOMETROGRAM W/&UP  -     INTRAABDOMINAL PRESSURE TEST  -     ANAL/URINARY MUSCLE STUDY  -     ELECTRO-UROFLOWMETRY, FIRST    Gross hematuria      - as noted above.    Thanks again for this consult.    Franki Palma MD, FACS  Urologist  [facility]  Office: [phone]

## 2024-09-24 ENCOUNTER — NURSE ONLY (OUTPATIENT)
Dept: SURGERY | Facility: CLINIC | Age: 79
End: 2024-09-24
Payer: COMMERCIAL

## 2024-09-24 ENCOUNTER — PROCEDURE (OUTPATIENT)
Dept: SURGERY | Facility: CLINIC | Age: 79
End: 2024-09-24
Payer: COMMERCIAL

## 2024-09-24 ENCOUNTER — TELEPHONE (OUTPATIENT)
Dept: SURGERY | Facility: CLINIC | Age: 79
End: 2024-09-24

## 2024-09-24 DIAGNOSIS — N39.41 URGE INCONTINENCE: Primary | ICD-10-CM

## 2024-09-24 DIAGNOSIS — N39.46 MIXED INCONTINENCE URGE AND STRESS: ICD-10-CM

## 2024-09-24 DIAGNOSIS — N13.8 BPH WITH OBSTRUCTION/LOWER URINARY TRACT SYMPTOMS: ICD-10-CM

## 2024-09-24 DIAGNOSIS — R82.90 URINE FINDING: Primary | ICD-10-CM

## 2024-09-24 DIAGNOSIS — N52.9 ERECTILE DYSFUNCTION, UNSPECIFIED ERECTILE DYSFUNCTION TYPE: ICD-10-CM

## 2024-09-24 DIAGNOSIS — N40.1 BPH WITH OBSTRUCTION/LOWER URINARY TRACT SYMPTOMS: ICD-10-CM

## 2024-09-24 DIAGNOSIS — R31.0 GROSS HEMATURIA: ICD-10-CM

## 2024-09-24 PROCEDURE — 51784 ANAL/URINARY MUSCLE STUDY: CPT | Performed by: UROLOGY

## 2024-09-24 PROCEDURE — 99215 OFFICE O/P EST HI 40 MIN: CPT | Performed by: UROLOGY

## 2024-09-24 PROCEDURE — 51741 ELECTRO-UROFLOWMETRY FIRST: CPT | Performed by: UROLOGY

## 2024-09-24 PROCEDURE — 52000 CYSTOURETHROSCOPY: CPT | Performed by: UROLOGY

## 2024-09-24 PROCEDURE — 51797 INTRAABDOMINAL PRESSURE TEST: CPT | Performed by: UROLOGY

## 2024-09-24 PROCEDURE — 51729 CYSTOMETROGRAM W/VP&UP: CPT | Performed by: UROLOGY

## 2024-09-24 RX ORDER — MIRABEGRON 50 MG/1
50 TABLET, EXTENDED RELEASE ORAL DAILY
Qty: 90 TABLET | Refills: 5 | Status: SHIPPED | OUTPATIENT
Start: 2024-09-24

## 2024-09-24 RX ORDER — FINASTERIDE 5 MG/1
5 TABLET, FILM COATED ORAL DAILY
Qty: 90 TABLET | Refills: 6 | Status: SHIPPED | OUTPATIENT
Start: 2024-09-24

## 2024-09-24 RX ORDER — SULFAMETHOXAZOLE/TRIMETHOPRIM 800-160 MG
1 TABLET ORAL ONCE
Status: COMPLETED | OUTPATIENT
Start: 2024-09-24 | End: 2024-09-24

## 2024-09-24 RX ORDER — SOLIFENACIN SUCCINATE 10 MG/1
10 TABLET, FILM COATED ORAL DAILY
Qty: 90 TABLET | Refills: 5 | Status: SHIPPED | OUTPATIENT
Start: 2024-09-24

## 2024-09-24 RX ORDER — TAMSULOSIN HYDROCHLORIDE 0.4 MG/1
0.4 CAPSULE ORAL DAILY
Qty: 90 CAPSULE | Refills: 5 | Status: SHIPPED | OUTPATIENT
Start: 2024-09-24

## 2024-09-24 RX ADMIN — SULFAMETHOXAZOLE/TRIMETHOPRIM 1 TABLET: 800-160 MG TABLET ORAL at 12:12:00

## 2024-09-24 NOTE — PROGRESS NOTES
RN received an order from Dr Anat Palma to provide CIC teachings to patient. Order carried out, procedure explained to patient and supplies gathered. RN demonstrated the use of CIC Clean Catheterization) with the use of training catheter (genital anatomy) model to practice the proper insertion an techniques.   Patient was able to successfully demonstrated CIC and emptied his bladder. Output: 100  ml  Due to retention,  urinary incontinence and nocturia, patient is to catheterized ONCE daily for an indefinite period of time. Patient also needs a Coude catheter due to the inability to pass a straight. Samples given and patient selected the catheter.    Cure Catheter Hydrophilic Coated 14 Fr Tip  LOT [id number]  Order placed and sent to [address] [place] [address]

## 2024-09-24 NOTE — PATIENT INSTRUCTIONS
1. Increase water intake to 40-60 ounces (2 liters) per day or enough to keep urine clear to pale yellow.  2. Cut back on dietary irritants such as coffee (even decaf), tea, soda, juice.   3. Continue tamsulosin, finasteride, mirabegron  4. Resume solifenacin. Call back if stream gets weaker  5. Start pelvic floor therapy  6. Start nightly CIC     Dermal Autograft Text: The defect edges were debeveled with a #15 scalpel blade.  Given the location of the defect, shape of the defect and the proximity to free margins a dermal autograft was deemed most appropriate.  Using a sterile surgical marker, the primary defect shape was transferred to the donor site. The area thus outlined was incised deep to adipose tissue with a #15 scalpel blade.  The harvested graft was then trimmed of adipose and epidermal tissue until only dermis was left.  The skin graft was then placed in the primary defect and oriented appropriately.

## 2024-09-24 NOTE — TELEPHONE ENCOUNTER
This encounter is now closed.     RN received a transfer call from wife asking if she should come to the office to join with . RN spoke to , who currently having UDS procedure. Per , no need for wife to come to office. RN relayed this to wife.   Wife understands and agreed to plans.

## 2024-11-01 ENCOUNTER — TELEPHONE (OUTPATIENT)
Dept: PHYSICAL THERAPY | Facility: HOSPITAL | Age: 79
End: 2024-11-01

## 2024-11-04 ENCOUNTER — OFFICE VISIT (OUTPATIENT)
Dept: PHYSICAL THERAPY | Facility: HOSPITAL | Age: 79
End: 2024-11-04
Attending: UROLOGY
Payer: MEDICARE

## 2024-11-04 DIAGNOSIS — N39.46 MIXED INCONTINENCE URGE AND STRESS: Primary | ICD-10-CM

## 2024-11-04 PROCEDURE — 97112 NEUROMUSCULAR REEDUCATION: CPT

## 2024-11-04 PROCEDURE — 97163 PT EVAL HIGH COMPLEX 45 MIN: CPT

## 2024-11-04 NOTE — PROGRESS NOTES
MUSCULOSKELETAL AND PELVIC FLOOR EVALUATION:     Diagnosis:   Mixed incontinence urge and stress (N39.46)         Referring Provider: Franki Palma  Date of Evaluation:    11/4/2024    Precautions:   HTN, asthma, OA Next MD visit:   none scheduled  Date of Surgery: n/a     PATIENT SUMMARY   Duke Garcia III \"Ander\" is a 79 year old male who presents to therapy today with complaints of mixed urinary incontinence.  He urinates 4-5x/day and has nocturia 3-4x/night (1-2x/night if he catheterizes prior to bed). Reports that he has been found to have a small bladder that has about half the capacity of a normal bladder. He does not empty his bladder well, so he was told to start catheterizing himself before bed to assist with more complete emptying. He additionally notices FRANSICO when he reaches or lifts; he has started avoiding movements that cause leakage. In addition to urinary issues, he reports intermittent constipation. He notices that constipation really only occurs when he is not drinking enough water. He does have to use a suppository about once per week.  He is trying to drink 1L of water per day. He reports that he often has a small BM every time he urinates. He denies straining, but he is in the habit of bearing down to defecate when he urinates. He does report that he gained about 20lbs during the pandemic, which he thinks contributes to his symptoms now. He is riding a stationary bike for ~20 mins per day. He has had 3 spine surgeries and reports losing ~5 inches in height due to DDD. He has a spinal cord stimulator put in >5 years ago for pain management. He does use a walker when walking longer distances; he has a lot of pain with walking or standing for prolonged periods or lifting heavy. He is limited to walking a couple hundred yards at at time.       Current symptoms include: urgency/frequency, incomplete emptying, constipation, and leakage    Pt describes pain level: current 1-2/10, best 1-2/10,  worst 6-7/10.   Quality: intermittent    Urodynamic Test: incomplete emptying   Manometry: NA  Occupation/Activities: Retired  PFDI-20:   CRAD 8: 28.13  BENSON 6: 54.17    Duke describes prior level of function as having slowly worsening PF symptoms. Pt goals include to address the stress and urge incontinence.  Past medical history was reviewed with [facility]. Significant findings include  has a past medical history of Arthritis, Asthma ([facility]), Back problem, Cataract, COPD (chronic obstructive pulmonary disease) ([facility]), Degenerative disc disease, cervical, Eczema, Esophageal reflux, Essential hypertension, Hearing impairment, High blood pressure, Interstitial lung disease ([facility]), Muscle weakness, Osteoarthritis, Osteopenia, Problems with swallowing, Pulmonary hypertension ([facility]), Scoliosis, Sleep apnea, Thoracic kyphosis, and Visual impairment.      URINARY HABITS  Types of symptoms: stress incontinence, urge incontinence, incomplete emptying, and nocturia  Events associated with the onset of urinary complaints: insidious, worsened after weight gain during pandemic  Abdominal Pressure complaints: no  Urinary Frequency: 4-5x/day  Urine Stream: diminished to WNL  Amount: diminished to WNL  Leaking occurs: with urgency, reaching, lifting  Episodes of Leakage: 1+ times per day  Amount of leakage: variable  Pad use: 1 Depends per day  Nocturia: 2-5x/night  Fluid Intake: up to 1 liter of water per day but often less  Bladder irritants: coffee, juice, alcohol (1-2 glasses per night)  Urine Stop test: unable  Post void dribble: NA  Hovering: NA  Empty bladder just in case: sometimes  Do you ever leak urine without knowing it? sometimes    BOWEL HABITS  Types of symptoms: Constipation and Incomplete emptying   Frequency of bowel movements: variable  Stool consistency: [place] Stool Scale: variable  Do you strain with defecation: Sometimes  Laxative use: suppository 1x/week      ASSESSMENT  Duke presents to physical therapy  evaluation with primary c/o urinary leakage. 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  Functional deficits include but are not limited to impaired QoL due to bowel and bladder dysfunction.  Signs and symptoms are consistent with diagnosis of mixed urinary incontinence. Pt and PT discussed evaluation findings, pathology, POC and HEP.  Pt voiced understanding and performs HEP correctly without reported pain. Skilled Pelvic Physical Therapy is medically necessary to address the above impairments and reach functional goals.    OBJECTIVE:   Posture: cervicothoracic kyphosis  Gait: pt ambulates on level ground with assistive device of rolling walker, decreased step length NAIMA, decreased foot clearance NAIMA, and stooped posture/forward lean.     Range Of Motion  Lumbar AROM screen: 75% impaired extension    Strength (MMT) 5/5 NAIMA LE except 3-/5 hip abd, extension  Transverse Abdominis: 2/5      Informed consent for internal pelvic evaluation given: Not today      Today's Treatment and Response:   Patient education was provided on objective findings of external and internal evaluation and expectations with treatment outcomes. Educated on pelvic anatomy and function with diagrams and pelvic model, bladder normatives, adequate hydration levels, and stress/urge urinary incontinence strategies    Neuro: 25'  Education provided on pelvic floor and pelvic organ anatomy and function (with use of model). Discussed common contributing factors to urge incontinence, as well as strategies for improving bladder control. Discussed avoiding \"JIC\" urination, implementing distraction techniques and other urge deferment strategies, avoiding bladder irritants, optimizing water intake, and implementing timed voiding. Discussed ultimate goal of urinating 1x every 2.5-3 hours or 5-7x in a 24 hour period. Provided and  discussed 4 handouts including urge deferment, bladder retraining, bladder fitness, and bladder irritants. Also discussed FRANSICO and bowel health considerations.  Charges: PT Eval High Complexity, neuro x 2      Total Timed Treatment: 25 min     Total Treatment Time: 50 min     Based on clinical rationale and outcome measures, this evaluation involved High Complexity decision making due to 3+ personal factors/comorbidities, 4+ body structures involved/activity limitations, and unstable symptoms including stress urinary incontinence and urge urinary incontinence  PLAN OF CARE:    Goals: (to be met in 10 visits)    Patient will report decreased urinary frequency to 5-7x/day and 1-2x/night indicating normalizing micturition reflex for improved bladder health and function.  Patient will report ability to postpone urination for at least 10 mins after initial urge presents for improved ability to participate in community outings without fear of UI.  Patient will report use of KNACK contraction at least 75% of the time to re-establish cough reflex and mitigate urinary leakage with increases in intra-abdominal pressure.   Patient will report stool consistency of grade 4 on BSS for improved bowel motility and emptying.   Patient will report adherence to HEP for continued exercise benefits following cessation of PT.    Frequency / Duration: Patient will be seen for 1-2 x/week or a total of 10 visits over a 90 day period.  Treatment will include: Manual Therapy, Neuromuscular Re-education, Self-Care Home Management, Therapeutic Activities, Therapeutic Exercise, and Home Exercise Program instruction     Education or treatment limitation: None  Rehab Potential:good      Patient/Family/Caregiver was advised of these findings, precautions, and treatment options and has agreed to actively participate in planning and for this course of care.    Thank you for your referral. Please co-sign or sign and return this letter via fax as soon  as possible to [phone]. If you have any questions, please contact me at Dept: [phone]    Sincerely,  Electronically signed by therapist: Dori Caro PT  Physician's certification required: Yes  I certify the need for these services furnished under this plan of treatment and while under my care.    X___________________________________________________ Date____________________    Certification From: 11/4/2024  To:2/2/2025

## 2024-11-08 ENCOUNTER — HOSPITAL ENCOUNTER (OUTPATIENT)
Dept: GENERAL RADIOLOGY | Facility: HOSPITAL | Age: 79
Discharge: HOME | End: 2024-11-08
Attending: OTOLARYNGOLOGY
Payer: MEDICARE

## 2024-11-08 DIAGNOSIS — R13.12 DYSPHAGIA, OROPHARYNGEAL PHASE: ICD-10-CM

## 2024-11-08 PROCEDURE — 74230 X-RAY XM SWLNG FUNCJ C+: CPT | Performed by: OTOLARYNGOLOGY

## 2024-11-08 PROCEDURE — 92611 MOTION FLUOROSCOPY/SWALLOW: CPT

## 2024-11-08 NOTE — PROGRESS NOTES
ADULT VIDEOFLUOROSCOPIC SWALLOWING STUDY    Admission Date: 11/8/2024  Evaluation Date: 11/08/24  Radiologist: John Maloney MD     RECOMMENDATIONS   Diet Recommendations - Solids: Regular  Diet Recommendations - Liquids: Thin Liquids    Further Follow-up:   Compensatory Strategies Recommended: Slow rate;Alternate consistencies;Small bites and sips  Aspiration Precautions: Upright position;Slow rate;Small bites and sips  Medication Administration Recommendations: One pill at a time       HISTORY   Background/Objective Information:    Pt alert and participatory; able to follow all commands for testing. History obtained via electronic medical chart review and patient/patient's wife in exam room. Pmhx includes but not limited to GERD and COPD. Pt received an endoscopy on 11/21/2023; pt with Schatzki's ring dilated to 20mm. On 8/21/2024, patient received a video stroboscopy procedure due to complaints of oropharyngeal dysphagia, chronic laryngitis, and hoarseness; results revealed bilateral atrophy with bowing. Pt reports consistent coughing both during and outside of po intake; also reports globus sensation that worsens when eating solid foods. When asked to point to the location of where he feels a globus sensation, patient pointed to the location of the mid-upper esophageal region. VFSS ordered by MD to more objectively assess swallow fxn, r/o aspiration, and recommend safest/least restrictive means of nutrition/hydration.     Problem List  Active Problems:    * No active hospital problems. *      Past Medical History  Past Medical History:    Arthritis    Asthma ([facility])    Back problem    Cataract    COPD (chronic obstructive pulmonary disease) ([facility])    Degenerative disc disease, cervical    Eczema    Esophageal reflux    Essential hypertension    Hearing impairment    bilateral hearing aids    High blood pressure    Interstitial lung disease ([facility])    Muscle weakness    Osteoarthritis    Osteopenia    Problems with  swallowing    Pulmonary hypertension ([facility])    Scoliosis    Sleep apnea    cpap    Thoracic kyphosis    Visual impairment       Current Diet Consistency: Regular;Thin liquids     Prior Living Situation: Home with spouse  History of Recent: No recent respiratory difficulty  Precautions: None  Imaging results: No recent imaging on file.    Reason for Referral:  (Pt reports globus sensation)    Family/Patient Goals: Discover etiology of symptoms     ASSESSMENT   DYSPHAGIA ASSESSMENT  Test completed in conjunction with Radiologist.  Patient Positioned: Upright;Midline;Standard Chair.  Patient Viewed: Lateral.  Consistencies Presented: Thin liquids;Puree;Soft solid;Hard solid to assess oropharyngeal swallow function and assess for compensatory strategies to improve safe swallow function.    THIN LIQUIDS  Method of Presentation: Teaspoon;Cup;Straw  Oral Phase of Swallow (VFSS - Thin Liquids): Within Functional Limits  Triggered at: Valleculae;Base of tongue  Laryngeal Penetration: None  Tracheal Aspiration: None        PUREE  Oral Phase of Swallow (VFSS - Puree): Within Functional Limits  Triggered at: Base of tongue  Laryngeal Penetration: None  Tracheal Aspiration: None    SOFT SOLID  Oral Phase of Swallow (VFSS - Soft Solid): Within Functional Limits  Triggered at: Valleculae  Laryngeal Penetration: None  Tracheal Aspiration: None    HARD SOLID  Oral Phase of Swallow (VFSS - Hard Solid): Within Functional Limits  Triggered at: Valleculae  Laryngeal Penetration: None  Tracheal Aspiration: None    Penetration Aspiration Scale Score: Score 1: Material does not enter airway       Overall Impression: Radiologist present in room; pt positioned upright and midline in standard chair and observed self-feeding of thin liquids via spoon, cup, and straw; pureed, soft solid, and hard solid. Utilized food items (apple slices and cake) that patient brought in from home. Results of exam revealed functional oral and pharyngeal swallow  mechanism. Oral phase characterized by adequate retrieval and containment of bolus, complete mastication, timely oral transit, and complete clearing of the oral cavity. Pharyngeal phase characterized by timely initiation of the pharyngeal response, adequate anterior hyoid excursion, complete epiglottic inversion, and complete laryngeal vestibular closure. No pharyngeal residue following any consistency. No laryngeal penetration or tracheal aspiration with any consistency.     Following exam, discussed results, diet recommendations, aspiration precautions, and plan with patient and his wife with good understanding reported. Answered questions to their apparent satisfaction. Utilized VFSS image to explain results of the study. Discussed possibility of contacting physician for a GI consult and/or esophagram to further investigate his complaints.    PLAN: Patient to continue on regular consistency and thin liquids diet; utilize aspiration precautions stated above.      INTERDISCIPLINARY COMMUNICATION  Reviewed results with Radiologist; agreement verbalized.    Patient Experiencing Pain: No     Thank you for your referral.   If you have any questions, please contact CARLINE Levin

## 2024-11-11 ENCOUNTER — APPOINTMENT (OUTPATIENT)
Dept: PHYSICAL THERAPY | Facility: HOSPITAL | Age: 79
End: 2024-11-11
Attending: UROLOGY
Payer: MEDICARE

## 2024-11-18 ENCOUNTER — OFFICE VISIT (OUTPATIENT)
Dept: PHYSICAL THERAPY | Facility: HOSPITAL | Age: 79
End: 2024-11-18
Attending: UROLOGY
Payer: MEDICARE

## 2024-11-18 PROCEDURE — 97110 THERAPEUTIC EXERCISES: CPT

## 2024-11-18 PROCEDURE — 97112 NEUROMUSCULAR REEDUCATION: CPT

## 2024-11-18 NOTE — PROGRESS NOTES
Diagnosis:   Mixed incontinence urge and stress (N39.46)       Referring Provider: Franki Palma  Date of Evaluation:    11/4/24    Precautions:   HTN, asthma, OA Next MD visit:   none scheduled  Date of Surgery: n/a   Insurance Primary/Secondary: [employer] / N/A     # Auth Visits: 10            Subjective: Reports that he has been working on urge deferment, which is going well. Reports more stress incontinence than urge incontinence recently.     Pain: 0/10      Objective:   Posture: cervicothoracic kyphosis  Gait: pt ambulates on level ground with assistive device of rolling walker, decreased step length NAIMA, decreased foot clearance NAIMA, and stooped posture/forward lean.      Range Of Motion  Lumbar AROM screen: 75% impaired extension     Strength (MMT) 5/5 NAIMA LE except 3-/5 hip abd, extension  Transverse Abdominis: 2/5          Assessment: Progressed to seated isometric PFM exercise today. Pt reported good ability to coordinate and sustain contractions, so provided isometric PFM exercise handout including prescribed sets and reps of elevators, endurance contractions, and quick flicks. Plan to continue with PFM activation with hip exercise next session and update HEP in 1-2 sessions.       Goals: (to be met in 10 visits)     Patient will report decreased urinary frequency to 5-7x/day and 1-2x/night indicating normalizing micturition reflex for improved bladder health and function.  Patient will report ability to postpone urination for at least 10 mins after initial urge presents for improved ability to participate in community outings without fear of UI.  Patient will report use of KNACK contraction at least 75% of the time to re-establish cough reflex and mitigate urinary leakage with increases in intra-abdominal pressure.   Patient will report stool consistency of grade 4 on BSS for improved bowel motility and emptying.   Patient will report adherence to HEP for continued exercise benefits  following cessation of PT.    Plan: F/u on isometric PFM exercise. Continue with supine/seated hip strengthening with PFM activation and update HEP.   Date: 11/18/2024  TX#: 2/10 Date:                 TX#: 3/ Date:                 TX#: 4/ Date:                 TX#: 5/ Date:   Tx#: 6/   Neuro: 25'  Symptom review/revisited urge deferment strategies    Seated elevator contractions x 10 (2 level ascents/descents)    Seated quick flicks 4 x 5    Seated endurance contractions with coordinated breathing x 10    HEP (PFM isometrics handout with instruction)           There-ex: 14'  Seated fitball squeeze with PFM pre-activation x 10    Seated hip ER sliders with PFM pre-activation x 8 B    Seated pelvic tilts with PFM activation x 10                     HEP: endurance contractions with breathing x 10, elevators x 10, quick flicks 4 x 5    Charges: neuro x 2, ther-ex x 1       Total Timed Treatment: 39 min  Total Treatment Time: 39 min

## 2024-11-25 ENCOUNTER — OFFICE VISIT (OUTPATIENT)
Dept: PHYSICAL THERAPY | Facility: HOSPITAL | Age: 79
End: 2024-11-25
Attending: UROLOGY
Payer: MEDICARE

## 2024-11-25 PROCEDURE — 97112 NEUROMUSCULAR REEDUCATION: CPT

## 2024-11-25 PROCEDURE — 97110 THERAPEUTIC EXERCISES: CPT

## 2024-11-25 NOTE — PROGRESS NOTES
Diagnosis:   Mixed incontinence urge and stress (N39.46)       Referring Provider: Franki Palma  Date of Evaluation:    11/4/24    Precautions:   HTN, asthma, OA Next MD visit:   none scheduled  Date of Surgery: n/a   Insurance Primary/Secondary: [employer]IC[employer] / N/A     # Auth Visits: 10            Subjective: Reports that he has been doing better with postponing urination. Reports defecating 2x/day pretty completely. He has been coughing more recently but not had any leakage with the coughing. He is feeling more tired than usual.     Pain: 0/10      Objective:   Posture: cervicothoracic kyphosis  Gait: pt ambulates on level ground with assistive device of rolling walker, decreased step length NAIMA, decreased foot clearance NAIMA, and stooped posture/forward lean.      Range Of Motion  Lumbar AROM screen: 75% impaired extension     Strength (MMT) 5/5 NAIMA LE except 3-/5 hip abd, extension  Transverse Abdominis: 2/5          Assessment: Continued to progress seated PFM exercise with concomitant hip strengthening today. Pt requires increased time and VC/TC to complete exercise, but he was otherwise able to complete all prescribed exercises, so updated HEP and discussed ideal frequency of performance. He continues to make steady progress towards goals.       Goals: (to be met in 10 visits)     Patient will report decreased urinary frequency to 5-7x/day and 1-2x/night indicating normalizing micturition reflex for improved bladder health and function.  Patient will report ability to postpone urination for at least 10 mins after initial urge presents for improved ability to participate in community outings without fear of UI.  Patient will report use of KNACK contraction at least 75% of the time to re-establish cough reflex and mitigate urinary leakage with increases in intra-abdominal pressure.   Patient will report stool consistency of grade 4 on BSS for improved bowel motility and emptying.   Patient will  report adherence to HEP for continued exercise benefits following cessation of PT.    Plan: F/u on updated HEP. Continue to progress PFM strengthening with fascially connected tissue.  Date: 11/18/2024  TX#: 2/10 Date:  11/25/24               TX#: 3/10 Date:                 TX#: 4/ Date:                 TX#: 5/ Date:   Tx#: 6/   Neuro: 25'  Symptom review/revisited urge deferment strategies    Seated elevator contractions x 10 (2 level ascents/descents)    Seated quick flicks 4 x 5    Seated endurance contractions with coordinated breathing x 10    HEP (PFM isometrics handout with instruction)     Neuro: 12'  Seated endurance contractions x 10    Seated quick flicks 4 x 5    Pelvic tilts with coordinated PFM activation x 20      There-ex: 14'  Seated fitball squeeze with PFM pre-activation x 10    Seated hip ER sliders with PFM pre-activation x 8 B    Seated pelvic tilts with PFM activation x 10 There-ex: 26'  Seated fitball squeeze with PFM pre-activation x 10    Seated hip ER sliders with PFM pre-activation x 10 B    Seated 2lb press with PFM pre-activation x 10    Seated 2lb trunk twists with PFM pre-activation x 10 B    Seated RTB hip abduction with PFM pre-activation x 15    HEP                                      HEP: endurance contractions with breathing x 10, elevators x 10, quick flicks 4 x 5  Access Code: [email or web address]  URL: [email or web address]  Date: 11/25/2024  Prepared by: Dori Caro  - Seated Isometric Hip Adduction with Pelvic Floor Contraction  - 1 x daily - 7 x weekly - 10 reps  - Seated Hip Abduction with Pelvic Floor Contraction and Resistance Loop  - 1 x daily - 7 x weekly - 10 reps  - Seated Exhale with Pelvic Floor Contraction and Medicine Ball Lift  - 1 x daily - 7 x weekly - 10 reps  - Pelvic Tilts on Swiss Ball With Pelvic Floor Contraction  - 1 x daily - 7 x weekly - 10 reps    Charges: neuro x 1, ther-ex x 2       Total Timed Treatment: 38 min  Total Treatment Time: 40  min

## 2024-12-02 ENCOUNTER — OFFICE VISIT (OUTPATIENT)
Dept: PHYSICAL THERAPY | Facility: HOSPITAL | Age: 79
End: 2024-12-02
Attending: UROLOGY
Payer: MEDICARE

## 2024-12-02 PROCEDURE — 97112 NEUROMUSCULAR REEDUCATION: CPT

## 2024-12-02 PROCEDURE — 97110 THERAPEUTIC EXERCISES: CPT

## 2024-12-02 NOTE — PROGRESS NOTES
Diagnosis:   Mixed incontinence urge and stress (N39.46)       Referring Provider: Franki Palma  Date of Evaluation:    11/4/24    Precautions:   HTN, asthma, OA Next MD visit:   none scheduled  Date of Surgery: n/a   Insurance Primary/Secondary: [employer] / N/A     # Auth Visits: 10            Subjective: Reports that he has been using pelvic floor contractions to help mitigate urgency. When he does have leakage, it occurs due to an overly full bladder. No leakage at night. Reports decreased thirst recently. He has been drinking no more than one 34 oz smart water per day. He does report a lot of difficulty with sit<>stand transfers.     Pain: 0/10      Objective:   Posture: cervicothoracic kyphosis  Gait: pt ambulates on level ground with assistive device of rolling walker, decreased step length NAIMA, decreased foot clearance NAIMA, and stooped posture/forward lean.      Range Of Motion  Lumbar AROM screen: 75% impaired extension     Strength (MMT) 5/5 NAIMA LE except 3-/5 hip abd, extension  Transverse Abdominis: 2/5          Assessment: Patient continues to make good progress towards long term goals. He has been able to successfully implement urge deferment strategies in order to make it to the bathroom prior to start of UI symptoms recently. He also reports improving PFM strength and endurance. He did express difficulty with transfers from the couch, so cued in pelvic tilts prior to performing sit>stand, as well as adding 2 x 10 sit to stand exercises per day to assist with functional LE strength and function. He verbalized understanding and agreement. Progress as tolerated.      Goals: (to be met in 10 visits)     Patient will report decreased urinary frequency to 5-7x/day and 1-2x/night indicating normalizing micturition reflex for improved bladder health and function.  Patient will report ability to postpone urination for at least 10 mins after initial urge presents for improved ability to  participate in community outings without fear of UI.  Patient will report use of KNACK contraction at least 75% of the time to re-establish cough reflex and mitigate urinary leakage with increases in intra-abdominal pressure.   Patient will report stool consistency of grade 4 on BSS for improved bowel motility and emptying.   Patient will report adherence to HEP for continued exercise benefits following cessation of PT.    Plan: Continue to progress PFM strengthening with fascially connected tissue. F/u on sit<>stand transfers w/ PF.  Date: 11/18/2024  TX#: 2/10 Date:  11/25/24               TX#: 3/10 Date: 12/2/24                TX#: 4/10 Date:                 TX#: 5/ Date:   Tx#: 6/   Neuro: 25'  Symptom review/revisited urge deferment strategies    Seated elevator contractions x 10 (2 level ascents/descents)    Seated quick flicks 4 x 5    Seated endurance contractions with coordinated breathing x 10    HEP (PFM isometrics handout with instruction)     Neuro: 12'  Seated endurance contractions x 10    Seated quick flicks 4 x 5    Pelvic tilts with coordinated PFM activation x 20 Neuro: 12'  Subjective    Reviewed bowel health practices including drinking enough water and using the constipation recipe, as needed     Discussed using pelvic tilts to warm up the pelvis/spine prior to performing sit>stand transition from the couch, prescribed 20 sit to stand transfers per day      There-ex: 14'  Seated fitball squeeze with PFM pre-activation x 10    Seated hip ER sliders with PFM pre-activation x 8 B    Seated pelvic tilts with PFM activation x 10 There-ex: 26'  Seated fitball squeeze with PFM pre-activation x 10    Seated hip ER sliders with PFM pre-activation x 10 B    Seated 2lb press with PFM pre-activation x 10    Seated 2lb trunk twists with PFM pre-activation x 10 B    Seated RTB hip abduction with PFM pre-activation x 15    HEP                   There-ex: 28'  Seated fitball squeeze with 5 sec hold x  10    Seated isometric ball press into thighs with APT and PFM activation x 15  -HEP    Seated 2lb overhead press with PFM pre-activation x 10    Seated 2lb trunk rotations with PFM pre-activation x 10    Seated GTB hip abduction with PFM pre-activation 2 x 10    Sit<>stands with PFM pre-activation 2 x 10  -added to HEP                   HEP: endurance contractions with breathing x 10, elevators x 10, quick flicks 4 x 5  Access Code: [email or web address]  URL: [email or web address]  Date: 11/25/2024  Prepared by: Dori Caro  - Seated Isometric Hip Adduction with Pelvic Floor Contraction  - 1 x daily - 7 x weekly - 10 reps  - Seated Hip Abduction with Pelvic Floor Contraction and Resistance Loop  - 1 x daily - 7 x weekly - 10 reps  - Seated Exhale with Pelvic Floor Contraction and Medicine Ball Lift  - 1 x daily - 7 x weekly - 10 reps  - Pelvic Tilts on Swiss Ball With Pelvic Floor Contraction  - 1 x daily - 7 x weekly - 10 reps    Charges: neuro x 1, ther-ex x 2       Total Timed Treatment: 40 min  Total Treatment Time: 40 min

## 2024-12-09 ENCOUNTER — OFFICE VISIT (OUTPATIENT)
Dept: PHYSICAL THERAPY | Facility: HOSPITAL | Age: 79
End: 2024-12-09
Attending: UROLOGY
Payer: MEDICARE

## 2024-12-09 PROCEDURE — 97112 NEUROMUSCULAR REEDUCATION: CPT

## 2024-12-09 PROCEDURE — 97110 THERAPEUTIC EXERCISES: CPT

## 2024-12-09 NOTE — PROGRESS NOTES
Diagnosis:   Mixed incontinence urge and stress (N39.46)       Referring Provider: Franki Palma  Date of Evaluation:    11/4/24    Precautions:   HTN, asthma, OA Next MD visit:   none scheduled  Date of Surgery: n/a   Insurance Primary/Secondary: [employer]IC[employer] / N/A     # Auth Visits: 10            Subjective: Reports that he has not been drinking much water. He's still dealing with constipation and more nocturia recently.     Pain: 0/10      Objective:   Posture: cervicothoracic kyphosis  Gait: pt ambulates on level ground with assistive device of rolling walker, decreased step length NAIMA, decreased foot clearance NAIMA, and stooped posture/forward lean.      Range Of Motion  Lumbar AROM screen: 75% impaired extension     Strength (MMT) 5/5 NAIMA LE except 3-/5 hip abd, extension  Transverse Abdominis: 2/5          Assessment: Patient has been up in the night up to 5x per night recently to urinate, so discussed sleep longer protocol today to assist with fluid processing 2 hours prior to bed to assist with more complete bladder emptying, which should decrease bouts of nocturia. Provided handout and walked pt through protocol today.       Goals: (to be met in 10 visits)     Patient will report decreased urinary frequency to 5-7x/day and 1-2x/night indicating normalizing micturition reflex for improved bladder health and function.  Patient will report ability to postpone urination for at least 10 mins after initial urge presents for improved ability to participate in community outings without fear of UI.  Patient will report use of KNACK contraction at least 75% of the time to re-establish cough reflex and mitigate urinary leakage with increases in intra-abdominal pressure.   Patient will report stool consistency of grade 4 on BSS for improved bowel motility and emptying.   Patient will report adherence to HEP for continued exercise benefits following cessation of PT.    Plan: Continue to progress PFM  strengthening with fascially connected tissue. F/u on sit<>stand transfers w/ PF and sleep longer protocol.   Date: 11/18/2024  TX#: 2/10 Date:  11/25/24               TX#: 3/10 Date: 12/2/24                TX#: 4/10 Date: 12/9/24               TX#: 5/10 Date:   Tx#: 6/   Neuro: 25'  Symptom review/revisited urge deferment strategies    Seated elevator contractions x 10 (2 level ascents/descents)    Seated quick flicks 4 x 5    Seated endurance contractions with coordinated breathing x 10    HEP (PFM isometrics handout with instruction)     Neuro: 12'  Seated endurance contractions x 10    Seated quick flicks 4 x 5    Pelvic tilts with coordinated PFM activation x 20 Neuro: 12'  Subjective    Reviewed bowel health practices including drinking enough water and using the constipation recipe, as needed     Discussed using pelvic tilts to warm up the pelvis/spine prior to performing sit>stand transition from the couch, prescribed 20 sit to stand transfers per day  Neuro: 15'  Subjective    Sleep longer protocol with legs on ball x 12 mins, handout provided    Discussed causes of frequent nighttime voiding and strategies for mitigating symptoms    There-ex: 14'  Seated fitball squeeze with PFM pre-activation x 10    Seated hip ER sliders with PFM pre-activation x 8 B    Seated pelvic tilts with PFM activation x 10 There-ex: 26'  Seated fitball squeeze with PFM pre-activation x 10    Seated hip ER sliders with PFM pre-activation x 10 B    Seated 2lb press with PFM pre-activation x 10    Seated 2lb trunk twists with PFM pre-activation x 10 B    Seated RTB hip abduction with PFM pre-activation x 15    HEP                   There-ex: 28'  Seated fitball squeeze with 5 sec hold x 10    Seated isometric ball press into thighs with APT and PFM activation x 15  -HEP    Seated 2lb overhead press with PFM pre-activation x 10    Seated 2lb trunk rotations with PFM pre-activation x 10    Seated GTB hip abduction with PFM  pre-activation 2 x 10    Sit<>stands with PFM pre-activation 2 x 10  -added to HEP There-ex: 27'  Seated fitball squeeze with PFM activation x 12    Seated iso fitball press with PFM activation x 12    Seated overhead 2lb press with PFM activation x 12    Sit<>stands with PFM pre-activation x 10    Discussed HEP including sleep longer protocol                           HEP: endurance contractions with breathing x 10, elevators x 10, quick flicks 4 x 5  Access Code: [email or web address]  URL: [email or web address]  Date: 11/25/2024  Prepared by: Dori Caro  - Seated Isometric Hip Adduction with Pelvic Floor Contraction  - 1 x daily - 7 x weekly - 10 reps  - Seated Hip Abduction with Pelvic Floor Contraction and Resistance Loop  - 1 x daily - 7 x weekly - 10 reps  - Seated Exhale with Pelvic Floor Contraction and Medicine Ball Lift  - 1 x daily - 7 x weekly - 10 reps  - Pelvic Tilts on Swiss Ball With Pelvic Floor Contraction  - 1 x daily - 7 x weekly - 10 reps    Charges: neuro x 1, ther-ex x 2       Total Timed Treatment: 42 min  Total Treatment Time: 44 min

## 2024-12-16 ENCOUNTER — OFFICE VISIT (OUTPATIENT)
Dept: PHYSICAL THERAPY | Facility: HOSPITAL | Age: 79
End: 2024-12-16
Attending: UROLOGY
Payer: MEDICARE

## 2024-12-16 PROCEDURE — 97112 NEUROMUSCULAR REEDUCATION: CPT

## 2024-12-16 PROCEDURE — 97110 THERAPEUTIC EXERCISES: CPT

## 2024-12-16 NOTE — PROGRESS NOTES
Diagnosis:   Mixed incontinence urge and stress (N39.46)       Referring Provider: Franki Palma  Date of Evaluation:    11/4/24    Precautions:   HTN, asthma, OA Next MD visit:   none scheduled  Date of Surgery: n/a   Insurance Primary/Secondary: [employer]ICARE / N/A     # Auth Visits: 10            Subjective: Reports that he was up 5-6x last night with urgency, but when he tries to urinate, it's a very small amount. He has not experienced much leakage recently.     Pain: 0/10      Objective:   Posture: cervicothoracic kyphosis  Gait: pt ambulates on level ground with assistive device of rolling walker, decreased step length NAIMA, decreased foot clearance NAIMA, and stooped posture/forward lean.      Range Of Motion  Lumbar AROM screen: 75% impaired extension     Strength (MMT) 5/5 NAIMA LE except 3-/5 hip abd, extension  Transverse Abdominis: 2/5          Assessment: Pt continues to make excellent progress towards long term goals. He is no longer experiencing FRANSICO symptoms; however, he does continue to have nocturia up to 5-6x. Discussed strategies for minimizing nocturia including limiting fluid consumption after 6pm, continuing with the sleep longer protocol, and practicing urge deferment at night. He verbalized understanding and agreement with plan. Plan for one more session and then discharge with [facility].       Goals: (to be met in 10 visits)     Patient will report decreased urinary frequency to 5-7x/day and 1-2x/night indicating normalizing micturition reflex for improved bladder health and function.  Patient will report ability to postpone urination for at least 10 mins after initial urge presents for improved ability to participate in community outings without fear of UI.  Patient will report use of KNACK contraction at least 75% of the time to re-establish cough reflex and mitigate urinary leakage with increases in intra-abdominal pressure.   Patient will report stool consistency of grade 4 on BSS for  improved bowel motility and emptying.   Patient will report adherence to HEP for continued exercise benefits following cessation of PT.    Plan: F/u on sleep longer protocol, urge deferment at night, limiting fluids after 6pm. Discharge.   Date: 11/18/2024  TX#: 2/10 Date:  11/25/24               TX#: 3/10 Date: 12/2/24                TX#: 4/10 Date: 12/9/24               TX#: 5/10 Date: 12/16/24  Tx#: 6/10   Neuro: 25'  Symptom review/revisited urge deferment strategies    Seated elevator contractions x 10 (2 level ascents/descents)    Seated quick flicks 4 x 5    Seated endurance contractions with coordinated breathing x 10    HEP (PFM isometrics handout with instruction)     Neuro: 12'  Seated endurance contractions x 10    Seated quick flicks 4 x 5    Pelvic tilts with coordinated PFM activation x 20 Neuro: 12'  Subjective    Reviewed bowel health practices including drinking enough water and using the constipation recipe, as needed     Discussed using pelvic tilts to warm up the pelvis/spine prior to performing sit>stand transition from the couch, prescribed 20 sit to stand transfers per day  Neuro: 15'  Subjective    Sleep longer protocol with legs on ball x 12 mins, handout provided    Discussed causes of frequent nighttime voiding and strategies for mitigating symptoms Neuro: 32'  Continued to discuss symptom presentation and POC/discharge planning    Discussed strategies for minimizing nocturia including limiting fluid consumption after 6pm, continuing with the sleep longer protocol, and practicing urge deferment strategies at night    There-ex: 14'  Seated fitball squeeze with PFM pre-activation x 10    Seated hip ER sliders with PFM pre-activation x 8 B    Seated pelvic tilts with PFM activation x 10 There-ex: 26'  Seated fitball squeeze with PFM pre-activation x 10    Seated hip ER sliders with PFM pre-activation x 10 B    Seated 2lb press with PFM pre-activation x 10    Seated 2lb trunk twists with PFM  pre-activation x 10 B    Seated RTB hip abduction with PFM pre-activation x 15    HEP                   There-ex: 28'  Seated fitball squeeze with 5 sec hold x 10    Seated isometric ball press into thighs with APT and PFM activation x 15  -HEP    Seated 2lb overhead press with PFM pre-activation x 10    Seated 2lb trunk rotations with PFM pre-activation x 10    Seated GTB hip abduction with PFM pre-activation 2 x 10    Sit<>stands with PFM pre-activation 2 x 10  -added to HEP There-ex: 27'  Seated fitball squeeze with PFM activation x 12    Seated iso fitball press with PFM activation x 12    Seated overhead 2lb press with PFM activation x 12    Sit<>stands with PFM pre-activation x 10    Discussed HEP including sleep longer protocol          There-ex: 10'  Seated fitball squeeze with 5 sec hold x 10    Seated isometric fitball press with PFM pre-activation x 10    Sit<>stands with PFM pre-activation 2 x 10                       HEP: endurance contractions with breathing x 10, elevators x 10, quick flicks 4 x 5  Access Code: [email or web address]  URL: [email or web address]  Date: 11/25/2024  Prepared by: Dori Caro  - Seated Isometric Hip Adduction with Pelvic Floor Contraction  - 1 x daily - 7 x weekly - 10 reps  - Seated Hip Abduction with Pelvic Floor Contraction and Resistance Loop  - 1 x daily - 7 x weekly - 10 reps  - Seated Exhale with Pelvic Floor Contraction and Medicine Ball Lift  - 1 x daily - 7 x weekly - 10 reps  - Pelvic Tilts on Swiss Ball With Pelvic Floor Contraction  - 1 x daily - 7 x weekly - 10 reps    Charges: neuro x 2, ther-ex x 1       Total Timed Treatment: 42 min  Total Treatment Time: 44 min

## 2024-12-27 ENCOUNTER — APPOINTMENT (OUTPATIENT)
Dept: PHYSICAL THERAPY | Facility: HOSPITAL | Age: 79
End: 2024-12-27
Attending: UROLOGY
Payer: MEDICARE

## 2025-01-06 ENCOUNTER — APPOINTMENT (OUTPATIENT)
Dept: PHYSICAL THERAPY | Facility: HOSPITAL | Age: 80
End: 2025-01-06
Attending: UROLOGY
Payer: MEDICARE

## 2025-01-13 ENCOUNTER — APPOINTMENT (OUTPATIENT)
Dept: PHYSICAL THERAPY | Facility: HOSPITAL | Age: 80
End: 2025-01-13
Attending: UROLOGY
Payer: MEDICARE

## 2025-01-20 ENCOUNTER — APPOINTMENT (OUTPATIENT)
Dept: PHYSICAL THERAPY | Facility: HOSPITAL | Age: 80
End: 2025-01-20
Attending: UROLOGY
Payer: MEDICARE

## 2025-02-04 ENCOUNTER — HOSPITAL ENCOUNTER (OUTPATIENT)
Dept: GENERAL RADIOLOGY | Facility: HOSPITAL | Age: 80
Discharge: HOME | End: 2025-02-04
Attending: INTERNAL MEDICINE
Payer: MEDICARE

## 2025-02-04 DIAGNOSIS — R06.09 DOE (DYSPNEA ON EXERTION): ICD-10-CM

## 2025-02-04 DIAGNOSIS — Z87.09 H/O INTERSTITIAL LUNG DISEASE: ICD-10-CM

## 2025-02-04 DIAGNOSIS — R07.9 CHEST PAIN, UNSPECIFIED: ICD-10-CM

## 2025-02-04 PROCEDURE — 71046 X-RAY EXAM CHEST 2 VIEWS: CPT | Performed by: INTERNAL MEDICINE

## 2025-03-13 NOTE — PROGRESS NOTES
HPI:     Duke Garcia III is a 80 year old male with a PMH of HTN, ILD, MAL, pulm HTN, asthma, scoliosis, DDD, OA, eczema.    Following for:  1. BPH/LUTS  - Cysto 9/24/24: very mild BPH with wide open bladder neck.  - flomax for years, proscar 12/14/22  2. OAB/UI and fecal urgency  - myrbetriq and 10 vesicare 9/24/24. Was on myrbetriq and vesicare in the past  - UDS 9/24/24: first sens 95 mL, first desire 130 mL, strong 164 mL, max capacity 164 mL. No leakage with valsalva. max detrusor pressure of 96 cm H2O and 0 mL PVR.   - using depends since 2021, Irina reviewed  - vesicare didn't help  - oxybutynin was discussed by prior urologist but he didn't try  3. Prostate nodule  - stable for many years, noted during first exam with prior Urologist and no bx or MRI done  4. h/o gross hematuria and blood in stool  - s/p neg cysto and GI scope in 2016  - no recurrence  5. S/p left hydrocelectomy ~ 2015    PCP - Santiago  Prior Urologist - Kirill Villalobos ([place] - retired)  LOV 9/24/2024    Presents for check-up. Wife is here today.  He is taking proscar, myrbetriq and 10 mg vesicare. He is performs nightly CIC about 1-2 x per week when he feels full.  Had UTI last week and finishing abx. Urinary symptoms are better.    In PT 2 x per week for a couple falls. Lives at home. Walks with walker outside the house.  He finished PFT which helped.    AUA SS is 10/35 with 3 f; 2 n, CLAUDE; 1 w, u, I. Was 27/35 prior to kavin/pro, vesicare, myrbetriq with 5 u, CLAUDE; 4 n, w, f; 3 I; 2 s. LOV was 11/35 with 3 CLAUDE; 2 n, s; 1 w, u, I, f. Was 21/35 prior to myrbetriq with 5 n; 4 f, CLAUDE; 3 w, u; 2 I. Was 20/35 prior to proscar with 5 u; 4 f; 3 I, CLAUDE; 2 n, w; 1 s. Mostly happy with LUTS.  Incontinence: 1 PPD and often dry. Prior to myrbetriq had 2-3 depends per day, typically just damp. He is doing Kegels but not regularly.  Penoscrotal LOV: no abnormalities  ROSE LOV: ~ 2 cm nodule noted at apex, no tenderness    UA is negative and PVR is 134  - was 142, 229, 115 mL LOV, 70, 121 mL prior.    Reported ~ 30-40 oz water, 20 coffee, 2 glasses wine qhs with light yellow urine. I encouraged the pt drink at least 40-60 oz water per day or enough to keep urine clear to pale yellow and reviewed dietary irritants for OAB.    PSA 0.855 1/25/24    Has poor potency and prefers observation    UDS prior visit: first sensation 95 mL, first desire 130 mL, strong desire 164 mL, max bladder capacity of 164 mL. No leakage with valsalva. The patient was able to void with max detrusor pressure of 96 cm H2O and 0 mL PVR.    Discussed UDS and office cysto show small capacity bladder and very high detrusor pressures c/w OAB with likely partial RAY.    Continue increased water and cut back on grapes, wine, coffee for OAB and UTI preventin and exercise more. Continue proscar. Continue 50 mg myrbetriq and resuming vesicare. Continue PFT exercises. Observation for ED. Continue nightly CIC with option to incease. Fu in 6 mo with PVR.    Prior note:  Myrbetriq helped for about 2 weeks and now symptoms are bothersome again.    Was doing well up until about a week ago and started having a lot of frequency, nocturia.     Reports 2-3 y h/o OAB/LUTS which is worsening.    UTI hx: none  Diarrhea/constipation: intermittently both  Gross hematuria: several y ago had blood in stool and urine (had neg eval)  Tobacco hx: 20 pack years, quit 2015  Kidney stone hx: none  Fam h/o  malignancy: none    We reviewed cystitis tx and prevention strategies at today's visit and I provided educational materials for this. I encouraged the patient to drink at least 40-60 oz water per day or enough to keep urine clear. I also reviewed dietary irritants and provided educational materals for this. We also discussed trying prn AZO for pain relief with plenty of water.    We discussed Kegel exercises for incontinence. I provided and reviewed educational materials for this. He may also be interested in PFT later  but wife has cardiac issues so probably won't do this right now.    HISTORY:  Past Medical History:    Arthritis    Asthma (HCC)    Back problem    Cataract    COPD (chronic obstructive pulmonary disease) (HCC)    Degenerative disc disease, cervical    Eczema    Esophageal reflux    Essential hypertension    Hearing impairment    bilateral hearing aids    High blood pressure    Interstitial lung disease (HCC)    Muscle weakness    Osteoarthritis    Osteopenia    Problems with swallowing    Pulmonary hypertension (HCC)    Scoliosis    Sleep apnea    cpap    Thoracic kyphosis    Visual impairment      Past Surgical History:   Procedure Laterality Date    Appendectomy      Hernia surgery      Hip replacement surgery Left     Repair ing hernia,5+y/o,reducibl      Spine surgery procedure unlisted  2019    lumbar fusion    Spine surgery procedure unlisted      LUMBAR SURGERY X 2      No family history on file.   Social History:   Social History     Socioeconomic History    Marital status:    Tobacco Use    Smoking status: Former     Types: Cigarettes    Smokeless tobacco: Never   Vaping Use    Vaping status: Never Used   Substance and Sexual Activity    Alcohol use: Yes     Alcohol/week: 14.0 standard drinks of alcohol     Types: 14 Standard drinks or equivalent per week    Drug use: Never     Social Drivers of Health     Food Insecurity: No Food Insecurity (5/1/2024)    Food Insecurity     Food Insecurity: Never true   Transportation Needs: No Transportation Needs (5/1/2024)    Transportation Needs     Lack of Transportation: No   Housing Stability: Low Risk  (5/1/2024)    Housing Stability     Housing Instability: No        Medications (Active prior to today's visit):  Current Outpatient Medications   Medication Sig Dispense Refill    finasteride 5 MG Oral Tab Take 1 tablet (5 mg total) by mouth daily. 90 tablet 6    Mirabegron ER (MYRBETRIQ) 50 MG Oral Tablet 24 Hr Take 1 tablet (50 mg total) by mouth daily. 90  each 5    Solifenacin Succinate 10 MG Oral Tab Take 1 tablet (10 mg total) by mouth daily. 90 tablet 5    MULTIPLE VITAMIN OR Take by mouth As Directed.      azithromycin 250 MG Oral Tab TAKE 2 TABLETS BY MOUTH TODAY, THEN TAKE 1 TABLET DAILY FOR 4 DAYS AS DIRECTED (Patient not taking: Reported on 9/24/2024)      buPROPion 75 MG Oral Tab Take 1 tablet (75 mg total) by mouth daily.      hydrocortisone 2.5 % External Cream APPLY TO AFFECTED AREA ON FACE AND EAR TWICE A DAY, TILL RESOLVED      predniSONE 20 MG Oral Tab Take 1 tablet (20 mg total) by mouth daily.      famotidine 20 MG Oral Tab Take 1 tablet (20 mg total) by mouth 2 (two) times daily.      gabapentin 400 MG Oral Cap Take 2 capsules (800 mg total) by mouth 3 (three) times daily.      Tadalafil (CIALIS) 20 MG Oral Tab Take 1 tablet (20 mg total) by mouth daily as needed for Erectile Dysfunction. 30 tablet 5    fexofenadine 180 MG Oral Tab Take 1 tablet (180 mg total) by mouth daily.      Budesonide-Formoterol Fumarate 160-4.5 MCG/ACT Inhalation Aerosol Inhale 2 puffs into the lungs 2 (two) times daily.      montelukast 10 MG Oral Tab Take 1 tablet (10 mg total) by mouth nightly.      celecoxib 200 MG Oral Cap Take 1 capsule (200 mg total) by mouth 2 (two) times daily.      escitalopram 10 MG Oral Tab Take 2 tablets (20 mg total) by mouth daily.      triamcinolone 55 MCG/ACT Nasal Aerosol by Nasal route daily.      clobetasol 0.05 % External Cream Apply topically 2 (two) times daily.      polyethylene glycol, PEG 3350, 17 g Oral Powd Pack Take 17 g by mouth daily as needed.      cholecalciferol 50 MCG (2000 UT) Oral Cap Take 500 Units by mouth daily.      albuterol (5 MG/ML) 0.5% Inhalation Nebu Soln Take 0.5 mL (2.5 mg total) by nebulization every 6 (six) hours as needed for Wheezing.         Allergies:  Allergies   Allergen Reactions    Lisinopril ANGIOEDEMA and UNKNOWN    Tramadol ANGIOEDEMA and UNKNOWN    Codeine UNKNOWN         ROS:     A comprehensive  10 point review of systems was completed.  Pertinent positives and negatives noted in the the HPI.    PHYSICAL EXAM:     GENERAL APPEARANCE: well, developed, well nourished, in no acute distress  NEUROLOGIC: nonfocal, alert and oriented  HEAD: normocephalic, atraumatic  EYES: sclera non-icteric  EARS: hearing intact  ORAL CAVITY: mucosa moist  NECK/THYROID: no obvious goiter or masses  LUNGS: nonlabored breathing  ABDOMEN: soft, no obvious masses or tenderness  SKIN: no obvious rashes    : as noted above    ASSESSMENT/PLAN:   Diagnoses and all orders for this visit:    Urge incontinence  -     URINALYSIS, AUTO, W/O SCOPE  -     Mirabegron ER (MYRBETRIQ) 50 MG Oral Tablet 24 Hr; Take 1 tablet (50 mg total) by mouth daily.  -     Solifenacin Succinate 10 MG Oral Tab; Take 1 tablet (10 mg total) by mouth daily.    BPH with obstruction/lower urinary tract symptoms  -     URINALYSIS, AUTO, W/O SCOPE  -     finasteride 5 MG Oral Tab; Take 1 tablet (5 mg total) by mouth daily.    Erectile dysfunction, unspecified erectile dysfunction type  -     URINALYSIS, AUTO, W/O SCOPE    Gross hematuria  -     URINALYSIS, AUTO, W/O SCOPE    Mixed incontinence urge and stress  -     URINALYSIS, AUTO, W/O SCOPE    - as noted above.    Thanks again for this consult.    Franki Palma MD, FACS  Urologist  [facility]  Office: [phone]

## 2025-03-21 ENCOUNTER — OFFICE VISIT (OUTPATIENT)
Dept: SURGERY | Facility: CLINIC | Age: 80
End: 2025-03-21

## 2025-03-21 DIAGNOSIS — N13.8 BPH WITH OBSTRUCTION/LOWER URINARY TRACT SYMPTOMS: ICD-10-CM

## 2025-03-21 DIAGNOSIS — N39.41 URGE INCONTINENCE: Primary | ICD-10-CM

## 2025-03-21 DIAGNOSIS — N40.1 BPH WITH OBSTRUCTION/LOWER URINARY TRACT SYMPTOMS: ICD-10-CM

## 2025-03-21 DIAGNOSIS — N52.9 ERECTILE DYSFUNCTION, UNSPECIFIED ERECTILE DYSFUNCTION TYPE: ICD-10-CM

## 2025-03-21 DIAGNOSIS — R31.0 GROSS HEMATURIA: ICD-10-CM

## 2025-03-21 DIAGNOSIS — N39.46 MIXED INCONTINENCE URGE AND STRESS: ICD-10-CM

## 2025-03-21 LAB
APPEARANCE: CLEAR
BILIRUBIN: NEGATIVE
GLUCOSE (URINE DIPSTICK): NEGATIVE MG/DL
KETONES (URINE DIPSTICK): NEGATIVE MG/DL
LEUKOCYTES: NEGATIVE
MULTISTIX LOT#: NORMAL NUMERIC
NITRITE, URINE: NEGATIVE
OCCULT BLOOD: NEGATIVE
PH, URINE: 6.5 (ref 4.5–8)
PROTEIN (URINE DIPSTICK): NEGATIVE MG/DL
SPECIFIC GRAVITY: 1.02 (ref 1–1.03)
URINE-COLOR: YELLOW
UROBILINOGEN,SEMI-QN: 0.2 MG/DL (ref 0–1.9)

## 2025-03-21 PROCEDURE — 1160F RVW MEDS BY RX/DR IN RCRD: CPT | Performed by: UROLOGY

## 2025-03-21 PROCEDURE — 81003 URINALYSIS AUTO W/O SCOPE: CPT | Performed by: UROLOGY

## 2025-03-21 PROCEDURE — 1159F MED LIST DOCD IN RCRD: CPT | Performed by: UROLOGY

## 2025-03-21 PROCEDURE — G2211 COMPLEX E/M VISIT ADD ON: HCPCS | Performed by: UROLOGY

## 2025-03-21 PROCEDURE — 99214 OFFICE O/P EST MOD 30 MIN: CPT | Performed by: UROLOGY

## 2025-03-21 PROCEDURE — 51798 US URINE CAPACITY MEASURE: CPT | Performed by: UROLOGY

## 2025-03-21 RX ORDER — FINASTERIDE 5 MG/1
5 TABLET, FILM COATED ORAL DAILY
Qty: 90 TABLET | Refills: 6 | Status: SHIPPED | OUTPATIENT
Start: 2025-03-21

## 2025-03-21 RX ORDER — MIRABEGRON 50 MG/1
50 TABLET, FILM COATED, EXTENDED RELEASE ORAL DAILY
Qty: 90 EACH | Refills: 5 | Status: SHIPPED | OUTPATIENT
Start: 2025-03-21

## 2025-03-21 RX ORDER — SOLIFENACIN SUCCINATE 10 MG/1
10 TABLET, FILM COATED ORAL DAILY
Qty: 90 TABLET | Refills: 5 | Status: SHIPPED | OUTPATIENT
Start: 2025-03-21

## 2025-03-21 NOTE — PATIENT INSTRUCTIONS
Verify what medications you are taking - I have that you are currently taking finasteride, mirabegron/myrbetriq, and solifenacin/vesicare. And also verify that you are NOT taking flomax/tamsulosin  Increase water intake to 40-60 ounces (2 liters) per day or enough to keep urine clear to pale yellow.

## 2025-03-28 ENCOUNTER — APPOINTMENT (OUTPATIENT)
Dept: GENERAL RADIOLOGY | Facility: HOSPITAL | Age: 80
End: 2025-03-28
Attending: EMERGENCY MEDICINE
Payer: MEDICARE

## 2025-03-28 ENCOUNTER — HOSPITAL ENCOUNTER (INPATIENT)
Facility: HOSPITAL | Age: 80
LOS: 2 days | Discharge: HOME HEALTH | End: 2025-03-31
Attending: EMERGENCY MEDICINE | Admitting: INTERNAL MEDICINE
Payer: MEDICARE

## 2025-03-28 ENCOUNTER — APPOINTMENT (OUTPATIENT)
Dept: CT IMAGING | Facility: HOSPITAL | Age: 80
End: 2025-03-28
Attending: EMERGENCY MEDICINE
Payer: MEDICARE

## 2025-03-28 ENCOUNTER — HOSPITAL ENCOUNTER (INPATIENT)
Facility: HOSPITAL | Age: 80
LOS: 2 days | Discharge: HOME | End: 2025-03-31
Attending: EMERGENCY MEDICINE | Admitting: INTERNAL MEDICINE
Payer: MEDICARE

## 2025-03-28 DIAGNOSIS — R09.02 HYPOXIA: ICD-10-CM

## 2025-03-28 DIAGNOSIS — J18.9 COMMUNITY ACQUIRED PNEUMONIA OF LEFT LUNG, UNSPECIFIED PART OF LUNG: Primary | ICD-10-CM

## 2025-03-28 LAB
ALBUMIN SERPL-MCNC: 4.3 G/DL (ref 3.2–4.8)
ALBUMIN/GLOB SERPL: 1.7 {RATIO} (ref 1–2)
ALP LIVER SERPL-CCNC: 89 U/L
ALT SERPL-CCNC: 12 U/L
ANION GAP SERPL CALC-SCNC: 9 MMOL/L (ref 0–18)
AST SERPL-CCNC: 31 U/L (ref ?–34)
BASOPHILS # BLD AUTO: 0.02 X10(3) UL (ref 0–0.2)
BASOPHILS NFR BLD AUTO: 0.2 %
BILIRUB SERPL-MCNC: 1.7 MG/DL (ref 0.2–1.1)
BILIRUB UR QL STRIP.AUTO: NEGATIVE
BUN BLD-MCNC: 13 MG/DL (ref 9–23)
CALCIUM BLD-MCNC: 9.4 MG/DL (ref 8.7–10.6)
CHLORIDE SERPL-SCNC: 100 MMOL/L (ref 98–112)
CLARITY UR REFRACT.AUTO: CLEAR
CO2 SERPL-SCNC: 26 MMOL/L (ref 21–32)
COLOR UR AUTO: YELLOW
CREAT BLD-MCNC: 0.89 MG/DL
EGFRCR SERPLBLD CKD-EPI 2021: 86 ML/MIN/1.73M2 (ref 60–?)
EOSINOPHIL # BLD AUTO: 0.07 X10(3) UL (ref 0–0.7)
EOSINOPHIL NFR BLD AUTO: 0.8 %
ERYTHROCYTE [DISTWIDTH] IN BLOOD BY AUTOMATED COUNT: 12.8 %
FLUAV + FLUBV RNA SPEC NAA+PROBE: NEGATIVE
FLUAV + FLUBV RNA SPEC NAA+PROBE: NEGATIVE
GLOBULIN PLAS-MCNC: 2.6 G/DL (ref 2–3.5)
GLUCOSE BLD-MCNC: 104 MG/DL (ref 70–99)
GLUCOSE BLD-MCNC: 106 MG/DL (ref 70–99)
GLUCOSE UR STRIP.AUTO-MCNC: NORMAL MG/DL
HCT VFR BLD AUTO: 38.9 %
HGB BLD-MCNC: 14 G/DL
IMM GRANULOCYTES # BLD AUTO: 0.03 X10(3) UL (ref 0–1)
IMM GRANULOCYTES NFR BLD: 0.3 %
KETONES UR STRIP.AUTO-MCNC: 60 MG/DL
LEUKOCYTE ESTERASE UR QL STRIP.AUTO: NEGATIVE
LYMPHOCYTES # BLD AUTO: 1.21 X10(3) UL (ref 1–4)
LYMPHOCYTES NFR BLD AUTO: 13.3 %
MCH RBC QN AUTO: 36.1 PG (ref 26–34)
MCHC RBC AUTO-ENTMCNC: 36 G/DL (ref 31–37)
MCV RBC AUTO: 100.3 FL
MONOCYTES # BLD AUTO: 0.79 X10(3) UL (ref 0.1–1)
MONOCYTES NFR BLD AUTO: 8.7 %
NEUTROPHILS # BLD AUTO: 6.96 X10 (3) UL (ref 1.5–7.7)
NEUTROPHILS # BLD AUTO: 6.96 X10(3) UL (ref 1.5–7.7)
NEUTROPHILS NFR BLD AUTO: 76.7 %
NITRITE UR QL STRIP.AUTO: NEGATIVE
OSMOLALITY SERPL CALC.SUM OF ELEC: 281 MOSM/KG (ref 275–295)
PH UR STRIP.AUTO: 6.5 [PH] (ref 5–8)
PLATELET # BLD AUTO: 181 10(3)UL (ref 150–450)
POTASSIUM SERPL-SCNC: 4 MMOL/L (ref 3.5–5.1)
PROCALCITONIN SERPL-MCNC: 0.18 NG/ML (ref ?–0.05)
PROT SERPL-MCNC: 6.9 G/DL (ref 5.7–8.2)
PROT UR STRIP.AUTO-MCNC: NEGATIVE MG/DL
RBC # BLD AUTO: 3.88 X10(6)UL
RBC UR QL AUTO: NEGATIVE
RSV RNA SPEC NAA+PROBE: NEGATIVE
SARS-COV-2 RNA RESP QL NAA+PROBE: NOT DETECTED
SODIUM SERPL-SCNC: 135 MMOL/L (ref 136–145)
SP GR UR STRIP.AUTO: 1.02 (ref 1–1.03)
TROPONIN I SERPL HS-MCNC: 9 NG/L
UROBILINOGEN UR STRIP.AUTO-MCNC: 4 MG/DL
WBC # BLD AUTO: 9.1 X10(3) UL (ref 4–11)

## 2025-03-28 PROCEDURE — 93005 ELECTROCARDIOGRAM TRACING: CPT

## 2025-03-28 PROCEDURE — 82962 GLUCOSE BLOOD TEST: CPT

## 2025-03-28 PROCEDURE — 87040 BLOOD CULTURE FOR BACTERIA: CPT | Performed by: EMERGENCY MEDICINE

## 2025-03-28 PROCEDURE — 96365 THER/PROPH/DIAG IV INF INIT: CPT

## 2025-03-28 PROCEDURE — 71045 X-RAY EXAM CHEST 1 VIEW: CPT | Performed by: EMERGENCY MEDICINE

## 2025-03-28 PROCEDURE — 0241U SARS-COV-2/FLU A AND B/RSV BY PCR (GENEXPERT): CPT | Performed by: EMERGENCY MEDICINE

## 2025-03-28 PROCEDURE — 93010 ELECTROCARDIOGRAM REPORT: CPT

## 2025-03-28 PROCEDURE — 84145 PROCALCITONIN (PCT): CPT

## 2025-03-28 PROCEDURE — 84484 ASSAY OF TROPONIN QUANT: CPT | Performed by: EMERGENCY MEDICINE

## 2025-03-28 PROCEDURE — 99285 EMERGENCY DEPT VISIT HI MDM: CPT

## 2025-03-28 PROCEDURE — 70450 CT HEAD/BRAIN W/O DYE: CPT | Performed by: EMERGENCY MEDICINE

## 2025-03-28 PROCEDURE — 81003 URINALYSIS AUTO W/O SCOPE: CPT | Performed by: EMERGENCY MEDICINE

## 2025-03-28 PROCEDURE — 96375 TX/PRO/DX INJ NEW DRUG ADDON: CPT

## 2025-03-28 PROCEDURE — 80053 COMPREHEN METABOLIC PANEL: CPT | Performed by: EMERGENCY MEDICINE

## 2025-03-28 PROCEDURE — 96361 HYDRATE IV INFUSION ADD-ON: CPT

## 2025-03-28 PROCEDURE — 36415 COLL VENOUS BLD VENIPUNCTURE: CPT

## 2025-03-28 PROCEDURE — 85025 COMPLETE CBC W/AUTO DIFF WBC: CPT | Performed by: EMERGENCY MEDICINE

## 2025-03-28 RX ORDER — ONDANSETRON 2 MG/ML
4 INJECTION INTRAMUSCULAR; INTRAVENOUS EVERY 4 HOURS PRN
Status: CANCELLED | OUTPATIENT
Start: 2025-03-28 | End: 2025-03-29

## 2025-03-28 RX ORDER — ENOXAPARIN SODIUM 100 MG/ML
40 INJECTION SUBCUTANEOUS DAILY
Status: DISCONTINUED | OUTPATIENT
Start: 2025-03-29 | End: 2025-03-31

## 2025-03-28 RX ORDER — AZITHROMYCIN 250 MG/1
500 TABLET, FILM COATED ORAL
Status: COMPLETED | OUTPATIENT
Start: 2025-03-29 | End: 2025-03-30

## 2025-03-28 RX ORDER — SODIUM CHLORIDE 9 MG/ML
125 INJECTION, SOLUTION INTRAVENOUS CONTINUOUS
Status: DISCONTINUED | OUTPATIENT
Start: 2025-03-28 | End: 2025-03-31

## 2025-03-28 RX ORDER — SODIUM CHLORIDE 9 MG/ML
INJECTION, SOLUTION INTRAVENOUS CONTINUOUS
Status: CANCELLED | OUTPATIENT
Start: 2025-03-28 | End: 2025-03-29

## 2025-03-28 RX ORDER — SODIUM CHLORIDE, SODIUM LACTATE, POTASSIUM CHLORIDE, CALCIUM CHLORIDE 600; 310; 30; 20 MG/100ML; MG/100ML; MG/100ML; MG/100ML
INJECTION, SOLUTION INTRAVENOUS CONTINUOUS
Status: DISCONTINUED | OUTPATIENT
Start: 2025-03-28 | End: 2025-03-28

## 2025-03-29 PROBLEM — R09.02 HYPOXIA: Status: ACTIVE | Noted: 2025-03-29

## 2025-03-29 LAB
ANION GAP SERPL CALC-SCNC: 12 MMOL/L (ref 0–18)
BASOPHILS # BLD AUTO: 0.01 X10(3) UL (ref 0–0.2)
BASOPHILS NFR BLD AUTO: 0.1 %
BUN BLD-MCNC: 11 MG/DL (ref 9–23)
CALCIUM BLD-MCNC: 9.4 MG/DL (ref 8.7–10.6)
CHLORIDE SERPL-SCNC: 103 MMOL/L (ref 98–112)
CO2 SERPL-SCNC: 24 MMOL/L (ref 21–32)
CREAT BLD-MCNC: 0.85 MG/DL
EGFRCR SERPLBLD CKD-EPI 2021: 87 ML/MIN/1.73M2 (ref 60–?)
EOSINOPHIL # BLD AUTO: 0.11 X10(3) UL (ref 0–0.7)
EOSINOPHIL NFR BLD AUTO: 1.3 %
ERYTHROCYTE [DISTWIDTH] IN BLOOD BY AUTOMATED COUNT: 12.5 %
GLUCOSE BLD-MCNC: 94 MG/DL (ref 70–99)
HCT VFR BLD AUTO: 41 %
HGB BLD-MCNC: 14.4 G/DL
IMM GRANULOCYTES # BLD AUTO: 0.03 X10(3) UL (ref 0–1)
IMM GRANULOCYTES NFR BLD: 0.3 %
L PNEUMO AG UR QL: NEGATIVE
LYMPHOCYTES # BLD AUTO: 1.93 X10(3) UL (ref 1–4)
LYMPHOCYTES NFR BLD AUTO: 22.3 %
MAGNESIUM SERPL-MCNC: 1.9 MG/DL (ref 1.6–2.6)
MCH RBC QN AUTO: 36 PG (ref 26–34)
MCHC RBC AUTO-ENTMCNC: 35.1 G/DL (ref 31–37)
MCV RBC AUTO: 102.5 FL
MONOCYTES # BLD AUTO: 0.83 X10(3) UL (ref 0.1–1)
MONOCYTES NFR BLD AUTO: 9.6 %
NEUTROPHILS # BLD AUTO: 5.73 X10 (3) UL (ref 1.5–7.7)
NEUTROPHILS # BLD AUTO: 5.73 X10(3) UL (ref 1.5–7.7)
NEUTROPHILS NFR BLD AUTO: 66.4 %
OSMOLALITY SERPL CALC.SUM OF ELEC: 287 MOSM/KG (ref 275–295)
PLATELET # BLD AUTO: 178 10(3)UL (ref 150–450)
POTASSIUM SERPL-SCNC: 3.8 MMOL/L (ref 3.5–5.1)
RBC # BLD AUTO: 4 X10(6)UL
SODIUM SERPL-SCNC: 139 MMOL/L (ref 136–145)
STREP PNEUMO ANTIGEN, URINE: NEGATIVE
WBC # BLD AUTO: 8.6 X10(3) UL (ref 4–11)

## 2025-03-29 PROCEDURE — 87449 NOS EACH ORGANISM AG IA: CPT

## 2025-03-29 PROCEDURE — 97161 PT EVAL LOW COMPLEX 20 MIN: CPT

## 2025-03-29 PROCEDURE — 80048 BASIC METABOLIC PNL TOTAL CA: CPT

## 2025-03-29 PROCEDURE — 85025 COMPLETE CBC W/AUTO DIFF WBC: CPT

## 2025-03-29 PROCEDURE — 83735 ASSAY OF MAGNESIUM: CPT

## 2025-03-29 PROCEDURE — 94640 AIRWAY INHALATION TREATMENT: CPT

## 2025-03-29 PROCEDURE — 97530 THERAPEUTIC ACTIVITIES: CPT

## 2025-03-29 RX ORDER — IPRATROPIUM BROMIDE AND ALBUTEROL SULFATE 2.5; .5 MG/3ML; MG/3ML
3 SOLUTION RESPIRATORY (INHALATION) EVERY 4 HOURS PRN
Status: DISCONTINUED | OUTPATIENT
Start: 2025-03-29 | End: 2025-03-31

## 2025-03-29 RX ORDER — TAMSULOSIN HYDROCHLORIDE 0.4 MG/1
0.4 CAPSULE ORAL DAILY
COMMUNITY

## 2025-03-29 RX ORDER — OXYBUTYNIN CHLORIDE 10 MG/1
10 TABLET, EXTENDED RELEASE ORAL DAILY
Status: DISCONTINUED | OUTPATIENT
Start: 2025-03-29 | End: 2025-03-31

## 2025-03-29 RX ORDER — ACETAMINOPHEN 325 MG/1
650 TABLET ORAL EVERY 6 HOURS PRN
Status: DISCONTINUED | OUTPATIENT
Start: 2025-03-29 | End: 2025-03-31

## 2025-03-29 RX ORDER — FAMOTIDINE 20 MG/1
20 TABLET, FILM COATED ORAL 2 TIMES DAILY
Status: DISCONTINUED | OUTPATIENT
Start: 2025-03-29 | End: 2025-03-31

## 2025-03-29 RX ORDER — MIRABEGRON 50 MG/1
50 TABLET, FILM COATED, EXTENDED RELEASE ORAL DAILY
Status: DISCONTINUED | OUTPATIENT
Start: 2025-03-29 | End: 2025-03-31

## 2025-03-29 RX ORDER — FLUTICASONE PROPIONATE AND SALMETEROL 250; 50 UG/1; UG/1
1 POWDER RESPIRATORY (INHALATION) 2 TIMES DAILY
Status: DISCONTINUED | OUTPATIENT
Start: 2025-03-29 | End: 2025-03-31

## 2025-03-29 RX ORDER — FINASTERIDE 5 MG/1
5 TABLET, FILM COATED ORAL DAILY
Status: DISCONTINUED | OUTPATIENT
Start: 2025-03-29 | End: 2025-03-31

## 2025-03-29 RX ORDER — GABAPENTIN 400 MG/1
800 CAPSULE ORAL DAILY
Status: DISCONTINUED | OUTPATIENT
Start: 2025-03-29 | End: 2025-03-31

## 2025-03-29 RX ORDER — ESCITALOPRAM OXALATE 10 MG/1
10 TABLET ORAL DAILY
Status: DISCONTINUED | OUTPATIENT
Start: 2025-03-29 | End: 2025-03-31

## 2025-03-29 NOTE — ED INITIAL ASSESSMENT (HPI)
Patient here with his son and wife. Patient sts today around noon his legs gave out and he fell a few times and couldn't get up on his own. Patient sts uses oxygen at night. Denies sob,cp. Denies head trauma. First time was cleaning up a coffee spill, second time he fell getting up out of the couch.

## 2025-03-29 NOTE — ED QUICK NOTES
Orders for admission, patient is aware of plan and ready to go upstairs. Any questions, please call ED RN hattie at extension [phone].     Patient Covid vaccination status: Fully vaccinated     COVID Test Ordered in ED: SARS-CoV-2/Flu A and B/RSV by PCR (GeneXpert)    COVID Suspicion at Admission: N/A    Running Infusions:    sodium chloride Stopped (03/28/25 2300)      Zithromax 500mg IVPB  Mental Status/LOC at time of transport: a/ox4    Other pertinent information:  [facility]  CIWA score: N/A   NIH score:  N/A

## 2025-03-29 NOTE — SPIRITUAL CARE NOTE
Spiritual Care Visit Note    Patient Name: Duke Garcia III Date of Spiritual Care Visit: 25   : 1945 Primary Dx: Community acquired pneumonia of left lung, unspecified part of lung   Referred By:  ED rounds    Spiritual Care Taxonomy:    Intended Effects: Demonstrate caring and concern    Methods: Encourage sharing of feelings, Encourage story-telling, Offer support    Interventions: Acknowledge current situation, Active listening, Ask guided questions, Ask questions to bring forth feelings, Discuss concerns, Identify supportive relationship(s), Share words of hope and inspiration    Visit Type/Summary:  Duke was sitting parially up in bed visiting with his son and wife - who were about to leave for the evening.  He was in good spirits, but had difficulty talking much because of shortness of breath.   - Spiritual Care: Offered empathic listening and emotional support. Provided information regarding how to contact Spiritual Care.  remains available as needed for follow up.    Spiritual Care support can be requested via an Epic consult. For urgent/immediate needs, please contact the On Call  at: [facility]: ext [phone]    Denice Cardoza MDiv.  Staff      26.9

## 2025-03-29 NOTE — ED PROVIDER NOTES
Patient Seen in: [facility] Emergency Department      History     Chief Complaint   Patient presents with    Weakness     Stated Complaint: fallen a couple times today, no blood thinners, son reports increased weakness *    Subjective:   HPI    This is a 80-year-old male who was symptoms started sometime mid afternoon.  He is here with his family.  The patient states that he has been kind of weak and which she describes as both legs.  He states he just feels weak he has no other specific complaints he states he was in the toilet and could not get up.  He does not think he fell and hit his head.  He says he just stood up and just could not get up it happened twice today.  He had no one-sided weakness.  Just complaint generalized weakness he has no headache he denies any neck pain denies any chest pain denies any shortness of breath denies any abdominal pain.  Denies any diarrhea dysuria he is presently on antibiotic for UTI.  He does have some interstitial lung disease he is has a history of COPD he is not on home oxygen does take CPAP at night.  Denies any blood in the stools black stools or chills.  .  Denies any fevers, chest pain or shortness of breath at this present time he does have a chronic cough he says that for many years.    Objective:     Past Medical History:    Arthritis    Asthma (HCC)    Back problem    Cataract    COPD (chronic obstructive pulmonary disease) (HCC)    Degenerative disc disease, cervical    Eczema    Esophageal reflux    Essential hypertension    Hearing impairment    bilateral hearing aids    High blood pressure    Interstitial lung disease (HCC)    Muscle weakness    Osteoarthritis    Osteopenia    Problems with swallowing    Pulmonary hypertension (HCC)    Scoliosis    Sleep apnea    cpap    Thoracic kyphosis    Visual impairment              Past Surgical History:   Procedure Laterality Date    Appendectomy      Hernia surgery      Hip replacement surgery Left     Repair  ing hernia,5+y/o,reducibl      Spine surgery procedure unlisted  2019    lumbar fusion    Spine surgery procedure unlisted      LUMBAR SURGERY X 2                Social History     Socioeconomic History    Marital status:    Tobacco Use    Smoking status: Former     Types: Cigarettes    Smokeless tobacco: Never   Vaping Use    Vaping status: Never Used   Substance and Sexual Activity    Alcohol use: Yes     Alcohol/week: 14.0 standard drinks of alcohol     Types: 14 Standard drinks or equivalent per week    Drug use: Never     Social Drivers of Health     Food Insecurity: No Food Insecurity (5/1/2024)    Food Insecurity     Food Insecurity: Never true   Transportation Needs: No Transportation Needs (5/1/2024)    Transportation Needs     Lack of Transportation: No   Housing Stability: Low Risk  (5/1/2024)    Housing Stability     Housing Instability: No                  Physical Exam     ED Triage Vitals [03/28/25 2015]   /83   Pulse 99   Resp 18   Temp 98.3 °F (36.8 °C)   Temp src Temporal   SpO2 (!) 88 %   O2 Device None (Room air)       Current Vitals:   Vital Signs  BP: 140/84  Pulse: 89  Resp: (!) 27  Temp: 98.3 °F (36.8 °C)  Temp src: Temporal  MAP (mmHg): 100    Oxygen Therapy  SpO2: 99 %  O2 Device: Nasal cannula  O2 Flow Rate (L/min): 2 L/min        Physical Exam     General: .  Older gentleman.  He was noted to be hypoxic and the triage area.  The patient is in no respiratory distress    HEENT: Atraumatic, conjunctiva are not pale.  There is no icterus.  Oral mucosa Is wet.  No facial trauma.  The neck is supple.  No midline neck tenderness    LUNGS: Coarse breath sounds at the base.    CV: Cardiovascular is regular without murmurs or rubs.    ABD: The abdomen is soft nondistended nontender.  There is no rebound.  There is no guarding.    EXT: There is good pulses bilaterally.  There is no calf tenderness.  There is no rash noted.  There is no edema    NEURO: Alert and oriented x4.  Muscle  strength and sensory exam is grossly normal.  And the patient is neurologically intact with no focal findings.  No obvious weakness in upper or lower extremities.  Sensory normal facies no facial asymmetry.    ED Course     Labs Reviewed   COMP METABOLIC PANEL (14) - Abnormal; Notable for the following components:       Result Value    Glucose 106 (*)     Sodium 135 (*)     Bilirubin, Total 1.7 (*)     All other components within normal limits   CBC WITH DIFFERENTIAL WITH PLATELET - Abnormal; Notable for the following components:    HCT 38.9 (*)     .3 (*)     MCH 36.1 (*)     All other components within normal limits   URINALYSIS WITH CULTURE REFLEX - Abnormal; Notable for the following components:    Ketones Urine 60 (*)     Urobilinogen Urine 4 (*)     RBC Urine 3-5 (*)     Squamous Epi. Cells Few (*)     All other components within normal limits   PROCALCITONIN - Abnormal; Notable for the following components:    Procalcitonin 0.18 (*)     All other components within normal limits   POCT GLUCOSE - Abnormal; Notable for the following components:    POC Glucose 104 (*)     All other components within normal limits   TROPONIN I HIGH SENSITIVITY - Normal   SARS-COV-2/FLU A AND B/RSV BY PCR (GENEXPERT) - Normal    Narrative:     This test is intended for the qualitative detection and differentiation of SARS-CoV-2, influenza A, influenza B, and respiratory syncytial virus (RSV) viral RNA in nasopharyngeal or nares swabs from individuals suspected of respiratory viral infection consistent with COVID-19 by their healthcare provider. Signs and symptoms of respiratory viral infection due to SARS-CoV-2, influenza, and RSV can be similar.    Test performed using the Xpert Xpress SARS-CoV-2/FLU/RSV (real time RT-PCR)  assay on the [employer] instrument, [place], [place] [address].   This test is being used under the Food and Drug Administration's Emergency Use Authorization.    The authorized Fact Sheet for  Healthcare Providers for this assay is available upon request from the laboratory.   STREPTOCOCCUS PNEUMONIAE AG, URINE   LEGIONELLA URINE AG SEROGRP 1   BASIC METABOLIC PANEL (8)   MAGNESIUM   CBC WITH DIFFERENTIAL WITH PLATELET   RAINBOW DRAW LAVENDER   RAINBOW DRAW LIGHT GREEN   RAINBOW DRAW BLUE   RAINBOW DRAW GOLD   BLOOD CULTURE   BLOOD CULTURE   SPUTUM CULTURE               The patient was placed on monitors, IV was started, blood was drawn.  Complains of generalized weakness multitude of possibilities including hypoxia secondary to pneumonia, UTI dehydration electrolyte imbalance intracranial bleed       MDM   The EKG shows normal sinus rhythm.  There is no acute ST elevations or ischemic findings.  The rest of the EKG including rate rhythm axis and intervals I agree with the EKG report . The rate is 100.  The QRS duration is 68.  When compared to an old EKG from May 2024 no significant changes.    Admission disposition: 3/28/2025 10:17 PM       The patient's blood cultures were obtained, COVID, flu was negative procalcitonin was slightly elevated at 0.18 comprehensive was grossly negative troponin was negative CBC was grossly negative.    CAT scan of the head was done to rule out bleed, chest x-ray was done to rule out pneumonia.     I personally reviewed the radiographs and my individual interpretation shows    Findings of left-sided pneumonia.  No intracranial bleed or mass.    Also reviewed official report and it shows    XR CHEST AP PORTABLE  (CPT=71045)    Result Date: 3/28/2025  PROCEDURE:  XR CHEST AP PORTABLE  (CPT=71045)  TECHNIQUE:  AP chest radiograph was obtained.  COMPARISON:  [facility], XR CHEST PA + LAT CHEST (VDN=[id number]), 2/04/2025, 4:09 PM.  INDICATIONS:  fallen a couple times today, no blood thinners, son reports increased weakness and unable to get up when he falls. recently treated for UTI  PATIENT STATED HISTORY: (As transcribed by Technologist)  Patient offered no additional history  at this time    FINDINGS:  There are dependent densities in lower lungs bilaterally.  Findings are unchanged on the left but increased on the right.  This could represent worsening multifocal atelectasis with superimposed pneumonia not excluded.  Heart size is unchanged.  Aorta is atherosclerotic.  Deformity multiple right posterior ribs are consistent with healed rib fractures.  There are multiple suture anchors in the right proximal humerus.            CONCLUSION:  There are dependent densities in lower lungs bilaterally.  This is increased on the right and could represent worsening atelectasis with superimposed pneumonia not excluded.   LOCATION:  [facility]      Dictated by (CST): Maykel Reyes MD on 3/28/2025 at 10:05 PM     Finalized by (CST): Maykel Reyes MD on 3/28/2025 at 10:05 PM       CT BRAIN OR HEAD (CPT=70450)    Result Date: 3/28/2025  PROCEDURE:  CT BRAIN OR HEAD (68201)  COMPARISON:  [facility] , CT, CT BRAIN OR HEAD (78635), 5/01/2024, 9:50 PM.  INDICATIONS:  fallen a couple times today, no blood thinners, son reports increased weakness and unable to get up when he falls. recently treated for UTI, confusion, weakness  TECHNIQUE:  Noncontrast CT scanning is performed through the brain. Dose reduction techniques were used. Dose information is transmitted to the ACR (American College of Radiology) NRDR (National Radiology Data Registry) which includes the Dose Index Registry.  PATIENT STATED HISTORY: (As transcribed by Technologist)  Fallen a couple times today, no blood thinners, son reports increased weakness and unable to get up when he falls.    FINDINGS:  VENTRICLES/SULCI:  Stable moderate to severe atrophy.  No overt hydrocephalus. INTRACRANIAL:  Moderate to severe stable chronic deep white matter ischemic change.  No evidence of intracranial hemorrhage, mass or midline shift.  No extra-axial fluid collections.  Marked vascular calcification of the skull base specially the left vertebral artery.  No  dense MCA sign.  The basal cisterns are patent. SINUSES:           No sign of acute sinusitis.  MASTOIDS:          No sign of acute inflammation. SKULL:             No evidence for fracture or osseous abnormality. OTHER:             None.            CONCLUSION:  Stable chronic changes.  No acute disease.    LOCATION:  [place] [id number]   Dictated by (CST): Franki Cartagena MD on 3/28/2025 at 9:32 PM     Finalized by (CST): Franki Cartagena MD on 3/28/2025 at 9:35 PM      I did go back and reexamined the patient had been trying to go to the washroom and he slipped and fell.  In the patient fell onto his bottom.  When I examined him on repeat exam he did not hit his head he has no neck pain no headache.  No midline thoracic or lumbar pain he has got normal range of motion of his hip no pain at this present time.  He actually felt much better.  The patient's case was discussed with the hospitalist, Dr. Oliver  The patient will be admitted for further evaluation treatment.  Patient was given IV antibiotics.      2 L nasal cannula his oxygen levels within normal limits.    Medical Decision Making      Disposition and Plan     Clinical Impression:  1. Community acquired pneumonia of left lung, unspecified part of lung    2. Hypoxia         Disposition:  Admit  3/28/2025 10:17 pm    Follow-up:  No follow-up provider specified.        Medications Prescribed:  Current Discharge Medication List              Supplementary Documentation:         Hospital Problems       Present on Admission  Date Reviewed: 3/21/2025            ICD-10-CM Noted POA    * (Principal) Community acquired pneumonia of left lung, unspecified part of lung J18.9 3/28/2025 Unknown

## 2025-03-29 NOTE — PLAN OF CARE
NURSING ADMISSION NOTE      Patient admitted via Cart  Oriented to room 527.  Safety precautions initiated.  Bed in low position.  Call light in reach.    Admitted with PNA  Brought own walker and cpap machine  Admission navigator completed   Ax04. Bilateral hearing aid  Telemetry    received on 1.5-2L  Placed on cpap overnight  Verbalized pain on buttocks area, prn given.   Afebrile  Primofit  Fall risk.   Bed alarm  PT/OT  Up 1 walker at home.                       Problem: Patient/Family Goals  Goal: Patient/Family Long Term Goal  Description: Patient's Long Term Goal:   Discharge to room air  Interventions:- Follow plan of care  - See additional Care Plan goals for specific interventions  Outcome: Progressing  Goal: Patient/Family Short Term Goal  Description: Patient's Short Term Goal:   3/28 noc: maintain 02 needs  Interventions: -   IV zosyn  - See additional Care Plan goals for specific interventions  Outcome: Progressing     Problem: RESPIRATORY - ADULT  Goal: Achieves optimal ventilation and oxygenation  Description: INTERVENTIONS:- Assess for changes in respiratory status- Assess for changes in mentation and behavior- Position to facilitate oxygenation and minimize respiratory effort- Oxygen supplementation based on oxygen saturation or ABGs- Provide Smoking Cessation handout, if applicable- Encourage broncho-pulmonary hygiene including cough, deep breathe, Incentive Spirometry- Assess the need for suctioning and perform as needed- Assess and instruct to report SOB or any respiratory difficulty- Respiratory Therapy support as indicated- Manage/alleviate anxiety- Monitor for signs/symptoms of CO2 retention  Outcome: Progressing

## 2025-03-29 NOTE — PLAN OF CARE
Patient is A&O x4. SR/ST with exertion. SBA w/walker. Regular diet. 2L NC, baseline is RA. Lovenox subcutaneous. IV antibiotics. IVF. Call light within reach. Patient updated on POC. No further needs at this time.     Problem: Patient/Family Goals  Goal: Patient/Family Long Term Goal  Description: Patient's Long Term Goal:   Discharge to room air  Interventions:- Follow plan of care  - See additional Care Plan goals for specific interventions  Outcome: Progressing  Goal: Patient/Family Short Term Goal  Description: Patient's Short Term Goal:   3/28 noc: maintain 02 needs  Interventions: -   IV zosyn  - See additional Care Plan goals for specific interventions  Outcome: Progressing     Problem: RESPIRATORY - ADULT  Goal: Achieves optimal ventilation and oxygenation  Description: INTERVENTIONS:- Assess for changes in respiratory status- Assess for changes in mentation and behavior- Position to facilitate oxygenation and minimize respiratory effort- Oxygen supplementation based on oxygen saturation or ABGs- Provide Smoking Cessation handout, if applicable- Encourage broncho-pulmonary hygiene including cough, deep breathe, Incentive Spirometry- Assess the need for suctioning and perform as needed- Assess and instruct to report SOB or any respiratory difficulty- Respiratory Therapy support as indicated- Manage/alleviate anxiety- Monitor for signs/symptoms of CO2 retention  Outcome: Progressing

## 2025-03-29 NOTE — CONSULTS
Pulmonary H&P/Consult       NAME: Duke Garcia III - ROOM: [address] [place] [address]-A - MRN: [id number] - Age: 80 year old - :  1945    Date of Admission: 3/28/2025  8:17 PM  Admission Diagnosis: Hypoxia [R09.02]  Community acquired pneumonia of left lung, unspecified part of lung [J18.9]  Reason for consult: acute on chronic hypoxic resp failure    History of Present Illness: 81 y/o w/ h/o Asthma, Pulm HTN, Complex Sleep Apnea, Chronic Hypoxic Resp failure- 3L w/ activity who prseented to EDW w/ c/o falls and weakness.  He had a chest x-ray in the ED that raised concern for atelectasis vs PNA.  He was started on abx and admitted for further care.  Currently pt feels ok but still fatigued.  He denies other changes in his health.  States that in general he has not been using his O2 with activity.  He states he has been checking his sats while active and they have been WNL.  Conts to take his symbicort as prescribed.    Past Medical History:    Arthritis    Asthma (HCC)    Back problem    Cataract    COPD (chronic obstructive pulmonary disease) (HCC)    Degenerative disc disease, cervical    Eczema    Esophageal reflux    Essential hypertension    Hearing impairment    bilateral hearing aids    High blood pressure    Interstitial lung disease (HCC)    Muscle weakness    Osteoarthritis    Osteopenia    Problems with swallowing    Pulmonary hypertension (HCC)    Scoliosis    Sleep apnea    cpap    Thoracic kyphosis    Visual impairment      Past Surgical History:   Procedure Laterality Date    Appendectomy      Hernia surgery      Hip replacement surgery Left     Repair ing hernia,5+y/o,reducibl      Spine surgery procedure unlisted  2019    lumbar fusion    Spine surgery procedure unlisted      LUMBAR SURGERY X 2      Prescriptions Prior to Admission[1]  Allergies[2]    Social History:  Social History     Socioeconomic History    Marital status:      Spouse name: Not on file    Number of children: Not on file     Years of education: Not on file    Highest education level: Not on file   Occupational History    Not on file   Tobacco Use    Smoking status: Former     Types: Cigarettes    Smokeless tobacco: Never   Vaping Use    Vaping status: Never Used   Substance and Sexual Activity    Alcohol use: Yes     Alcohol/week: 14.0 standard drinks of alcohol     Types: 14 Standard drinks or equivalent per week    Drug use: Never    Sexual activity: Not on file   Other Topics Concern    Not on file   Social History Narrative    Not on file     Social Drivers of Health     Food Insecurity: No Food Insecurity (3/29/2025)    NCSS - Food Insecurity     Worried About Running Out of Food in the Last Year: No     Ran Out of Food in the Last Year: No   Transportation Needs: No Transportation Needs (3/29/2025)    NCSS - Transportation     Lack of Transportation: No   Housing Stability: Not At Risk (3/29/2025)    NCSS - Housing/Utilities     Has Housing: Yes     Worried About Losing Housing: No     Unable to Get Utilities: No          Family History:  History reviewed. No pertinent family history.     Home Medications:  Medications Taking[3]    Scheduled Medication:   escitalopram  10 mg Oral Daily    famotidine  20 mg Oral BID    finasteride  5 mg Oral Daily    gabapentin  800 mg Oral Daily    [Held by provider] Mirabegron ER  50 mg Oral Daily    oxybutynin ER  10 mg Oral Daily    enoxaparin  40 mg Subcutaneous Daily    cefTRIAXone  2 g Intravenous Q24H    azithromycin  500 mg Oral Daily     Continuous Infusing Medication:   sodium chloride 125 mL/hr (03/29/25 0548)     PRN Medication:  acetaminophen     REVIEW OF SYSTEMS:   14 point ROS conducted, negative save for above       OBJECTIVE:  Vitals:    03/29/25 0533 03/29/25 0609 03/29/25 0745 03/29/25 0928   BP: 146/89  111/82    BP Location: Right arm  Right arm    Pulse:   93 88   Resp: 20  18    Temp: 99.4 °F (37.4 °C)  98 °F (36.7 °C)    TempSrc: Oral  Oral    SpO2: 93% 95% 96% 95%    Weight:       Height:           Oxygen Therapy  SpO2: 95 %  O2 Device: Nasal cannula  O2 Flow Rate (L/min): 2 L/min  Pulse Oximetry Type: Continuous  Oximetry Probe Site Changed: No  Pulse Ox Probe Location: Right hand                  Wt Readings from Last 3 Encounters:   03/29/25 188 lb (85.3 kg)   05/01/24 190 lb (86.2 kg)   11/21/23 190 lb (86.2 kg)         Intake/Output Summary (Last 24 hours) at 3/29/2025 1129  Last data filed at 3/29/2025 0609  Gross per 24 hour   Intake 1100 ml   Output 900 ml   Net 200 ml       /62 (BP Location: Right arm)   Pulse 78   Temp 97.9 °F (36.6 °C) (Oral)   Resp 18   Ht 5' 6\" (1.676 m)   Wt 188 lb (85.3 kg)   SpO2 97%   BMI 30.34 kg/m²     General Appearance:    Alert, cooperative, no distress, appears stated age   Head:    Normocephalic, without obvious abnormality, atraumatic   Eyes:    PERRL, conjunctiva/corneas clear, EOM's intact, both eyes   Throat:   Lips, mucosa, and tongue normal; teeth and gums normal   Neck:   Supple, symmetrical, trachea midline, no adenopathy;        thyroid:  No enlargement/tenderness/nodules; no carotid    bruit or JVD   Back:     Symmetric, no curvature, ROM normal, no CVA tenderness   Lungs:     Diminished bilaterally, respirations unlabored   Chest wall:    No tenderness or deformity   Heart:    Regular rate and rhythm, S1 and S2 normal, no murmur, rub   or gallop   Abdomen:     Soft, non-tender, bowel sounds active all four quadrants,     no masses, no organomegaly   Extremities:   Extremities normal, atraumatic, no cyanosis or edema   Pulses:   2+ and symmetric all extremities   Skin:   Skin color, texture, turgor normal, no rashes or lesions   Neurologic:   CNII-XII intact. Normal strength, sensation and reflexes       throughout       Labs reviewed as noted below    Imaging: chest x-ray reviewed as noted above    ASSESSMENT/PLAN:    Acute on Chronic Hypoxic resp failure  -due to below  -wean to baseline (3L w/ activity)  Lobar  Pneumonia  -cont abx  -monitor cultures  Asthma  -advair for symbicort  -BD protocol  CSA  -CPAP w/ sleep                   Jovanny [facility]  Pulmonary and Critical Care         [1]   Medications Prior to Admission   Medication Sig Dispense Refill Last Dose/Taking    tamsulosin 0.4 MG Oral Cap Take 1 capsule (0.4 mg total) by mouth daily.   Taking    finasteride 5 MG Oral Tab Take 1 tablet (5 mg total) by mouth daily. 90 tablet 6 3/28/2025    Mirabegron ER (MYRBETRIQ) 50 MG Oral Tablet 24 Hr Take 1 tablet (50 mg total) by mouth daily. 90 each 5 3/28/2025 Morning    Solifenacin Succinate 10 MG Oral Tab Take 1 tablet (10 mg total) by mouth daily. 90 tablet 5 3/28/2025    hydrocortisone 2.5 % External Cream APPLY TO AFFECTED AREA ON FACE AND EAR TWICE A DAY, TILL RESOLVED   3/28/2025    famotidine 20 MG Oral Tab Take 1 tablet (20 mg total) by mouth 2 (two) times daily.   3/28/2025    gabapentin 400 MG Oral Cap Take 2 capsules (800 mg total) by mouth daily.   3/28/2025    Budesonide-Formoterol Fumarate 160-4.5 MCG/ACT Inhalation Aerosol Inhale 2 puffs into the lungs 2 (two) times daily.   3/28/2025 Morning    montelukast 10 MG Oral Tab Take 1 tablet (10 mg total) by mouth daily.   3/28/2025 Morning    celecoxib 200 MG Oral Cap Take 1 capsule (200 mg total) by mouth 2 (two) times daily.   3/29/2025 Morning    escitalopram 10 MG Oral Tab Take 1 tablet (10 mg total) by mouth daily.   3/28/2025    polyethylene glycol, PEG 3350, 17 g Oral Powd Pack Take 17 g by mouth daily as needed.   3/28/2025 Noon    albuterol (5 MG/ML) 0.5% Inhalation Nebu Soln Take 0.5 mL (2.5 mg total) by nebulization every 6 (six) hours as needed for Wheezing.   Past Month    MULTIPLE VITAMIN OR Take by mouth As Directed.       azithromycin 250 MG Oral Tab TAKE 2 TABLETS BY MOUTH TODAY, THEN TAKE 1 TABLET DAILY FOR 4 DAYS AS DIRECTED (Patient not taking: Reported on 9/24/2024)       buPROPion 75 MG Oral Tab Take 1 tablet (75 mg  total) by mouth daily.       Tadalafil (CIALIS) 20 MG Oral Tab Take 1 tablet (20 mg total) by mouth daily as needed for Erectile Dysfunction. 30 tablet 5 Unknown    fexofenadine 180 MG Oral Tab Take 1 tablet (180 mg total) by mouth daily.       triamcinolone 55 MCG/ACT Nasal Aerosol by Nasal route daily.       clobetasol 0.05 % External Cream Apply topically 2 (two) times daily.       cholecalciferol 50 MCG (2000 UT) Oral Cap Take 500 Units by mouth daily.      [2]   Allergies  Allergen Reactions    Lisinopril ANGIOEDEMA and UNKNOWN    Tramadol ANGIOEDEMA and UNKNOWN    Codeine UNKNOWN   [3]   Outpatient Medications Marked as Taking for the 3/28/25 encounter (Hospital Encounter)   Medication Sig Dispense Refill    tamsulosin 0.4 MG Oral Cap Take 1 capsule (0.4 mg total) by mouth daily.      finasteride 5 MG Oral Tab Take 1 tablet (5 mg total) by mouth daily. 90 tablet 6    Mirabegron ER (MYRBETRIQ) 50 MG Oral Tablet 24 Hr Take 1 tablet (50 mg total) by mouth daily. 90 each 5    Solifenacin Succinate 10 MG Oral Tab Take 1 tablet (10 mg total) by mouth daily. 90 tablet 5    hydrocortisone 2.5 % External Cream APPLY TO AFFECTED AREA ON FACE AND EAR TWICE A DAY, TILL RESOLVED      famotidine 20 MG Oral Tab Take 1 tablet (20 mg total) by mouth 2 (two) times daily.      gabapentin 400 MG Oral Cap Take 2 capsules (800 mg total) by mouth daily.      Budesonide-Formoterol Fumarate 160-4.5 MCG/ACT Inhalation Aerosol Inhale 2 puffs into the lungs 2 (two) times daily.      montelukast 10 MG Oral Tab Take 1 tablet (10 mg total) by mouth daily.      celecoxib 200 MG Oral Cap Take 1 capsule (200 mg total) by mouth 2 (two) times daily.      escitalopram 10 MG Oral Tab Take 1 tablet (10 mg total) by mouth daily.      polyethylene glycol, PEG 3350, 17 g Oral Powd Pack Take 17 g by mouth daily as needed.      albuterol (5 MG/ML) 0.5% Inhalation Nebu Soln Take 0.5 mL (2.5 mg total) by nebulization every 6 (six) hours as needed for  Wheezing.

## 2025-03-29 NOTE — PHYSICAL THERAPY NOTE
PHYSICAL THERAPY EVALUATION - INPATIENT     Room Number: 527/527-A  Evaluation Date: 3/29/2025  Type of Evaluation: Initial  Physician Order: PT Eval and Treat    Presenting Problem: admit s/p falls, weakness  Co-Morbidities : asthma, pHTN, chronic respiratory failure  Reason for Therapy: Mobility Dysfunction and Discharge Planning    PHYSICAL THERAPY ASSESSMENT   Patient is a 80 year old male admitted 3/28/2025 for weakness, s/p fall.  Prior to admission, patient's baseline is grossly mod IND.  Patient is currently functioning below baseline with bed mobility, transfers, gait, and maintaining seated position.  Patient is requiring stand-by assist as a result of the following impairments: decreased functional strength, decreased endurance/aerobic capacity, and decreased muscular endurance.  Physical Therapy will continue to follow for duration of hospitalization.    Patient will benefit from continued skilled PT Services at discharge to promote prior level of function and safety with additional support and return home with home health PT.    PLAN DURING HOSPITALIZATION  Nursing Mobility Recommendation : 1 Assist  PT Device Recommendation: Rolling walker, Gait belt  PT Treatment Plan: Body mechanics, Bed mobility, Endurance, Energy conservation, Patient education, Family education, Gait training, Neuromuscular re-educate, Range of motion, Strengthening, Stoop training, Stair training, Transfer training, Balance training  Rehab Potential : Good  Frequency (Obs): 3x/week     CURRENT GOALS    Goal #1 Patient is able to demonstrate supine - sit EOB @ level: modified independent     Goal #2 Patient is able to demonstrate transfers Sit to/from Stand at assistance level: modified independent     Goal #3 Patient is able to ambulate 75 feet with assist device: walker - rolling at assistance level: modified independent     Goal #4    Goal #5    Goal #6    Goal Comments: Goals established on 3/29/2025      PHYSICAL THERAPY  MEDICAL/SOCIAL HISTORY  History related to current admission: Patient is a 80 year old male admitted on 3/28/2025 from home for weakness, s/p fall.  Pt diagnosed with PNA.    HOME SITUATION  Type of Home: Apartment (3rd fl)  Home Layout: Elevator                     Lives With: Spouse    Drives: No   Patient Regularly Uses: Rolling walker, Gait belt      Prior Level of [place]: per pt, grossly mod IND prior to admission. Uses rollator. 3rd fl apartment, elevator in his building. Lives with spouse who drives. Walk in shower with grab bars. 4 falls in past year.     SUBJECTIVE  \"My legs are weak\"    OBJECTIVE     Fall Risk: Standard fall risk    WEIGHT BEARING RESTRICTION     PAIN ASSESSMENT  Ratin          COGNITION  Overall Cognitive Status:  WFL - within functional limits    RANGE OF MOTION AND STRENGTH ASSESSMENT  Upper extremity ROM and strength are within functional limits     Lower extremity ROM is within functional limits     Lower extremity strength grossly 4/5    BALANCE  Static Sitting: Fair  Dynamic Sitting: Fair  Static Standing: Fair -  Dynamic Standing: Fair -    ADDITIONAL TESTS                                    ACTIVITY TOLERANCE                         O2 WALK       NEUROLOGICAL FINDINGS  Neurological Findings: None                     AM-PAC '6-Clicks' INPATIENT SHORT FORM - BASIC MOBILITY  How much difficulty does the patient currently have...  Patient Difficulty: Turning over in bed (including adjusting bedclothes, sheets and blankets)?: A Little   Patient Difficulty: Sitting down on and standing up from a chair with arms (e.g., wheelchair, bedside commode, etc.): A Little   Patient Difficulty: Moving from lying on back to sitting on the side of the bed?: A Little   How much help from another person does the patient currently need...   Help from Another: Moving to and from a bed to a chair (including a wheelchair)?: A Little   Help from Another: Need to walk in hospital room?: A Little    Help from Another: Climbing 3-5 steps with a railing?: Total     AM-PAC Score:  Raw Score: 16   Approx Degree of Impairment: 54.16%   Standardized Score (AM-PAC Scale): 40.78   CMS Modifier (G-Code): CK    FUNCTIONAL ABILITY STATUS  Gait Assessment   Functional Mobility/Gait Assessment  Gait Assistance:  (SBA)  Distance (ft): 25  Assistive Device: Rolling walker  Pattern: Within Functional Limits    Skilled Therapy Provided     Bed Mobility:  Supine to sit: SBA   Sit to supine: SBA     Transfer Mobility:  Sit to stand: SBA, RW   Stand to sit: SBA, RW  Gait = SBA, RW    Therapist's Comments: pt educated on role of IP PT services, PT evaluation purpose, PT POC, PT goals, device recommendations, dc recommendations, and importance of mobility while hospitalized.   -pt greeted supine.   -VC throughout session for safety/sequencing. Incr time required to complete mobility tasks.     Exercise/Education Provided:  Bed mobility  Body mechanics  Energy conservation  Functional activity tolerated  Neuromuscular re-educate  Posture  Transfer training    Patient End of Session: In bed, Needs met, Call light within reach, RN aware of session/findings, All patient questions and concerns addressed, Hospital anti-slip socks, Alarm set      Patient Evaluation Complexity Level:  History Moderate - 1 or 2 personal factors and/or co-morbidities   Examination of body systems Moderate - addressing a total of 3 or more elements   Clinical Presentation Low- Stable   Clinical Decision Making Low Complexity       PT Session Time: 25 minutes  Therapeutic Activity: 10 minutes

## 2025-03-29 NOTE — ED QUICK NOTES
Pt stated he had to have BM, RN brought wheelchair to room, wheelchair locked on both sides, pt being assisted to wheelchair, fell onto buttocks, pt did not hit head, pt denies pain, Dr. Miles notified and will assess pt.

## 2025-03-29 NOTE — H&P
General Medicine H&P     Chief Complaint   Patient presents with    Weakness        PCP: Roberto Henderson MD    History of Present Illness: Patient is a 80 year old male with PMH including but not limited to GERD, COPD, HTN, pHTN, MAL who p/t [facility] ED c weakness and possible PNA.     Lives c wife at home. Had fall, weak. Doesn't remember feeling LH or dizzy. Falls in past 2d. No cp. Follows c Dr. Cheryl Topete, says heart \"perfect\".     On 1L. No new SOB, no f/c. Does have cough from sinuses. Hoarse throat. Told to use IS.       Past Medical History:    Arthritis    Asthma (HCC)    Back problem    Cataract    COPD (chronic obstructive pulmonary disease) (HCC)    Degenerative disc disease, cervical    Eczema    Esophageal reflux    Essential hypertension    Hearing impairment    bilateral hearing aids    High blood pressure    Interstitial lung disease (HCC)    Muscle weakness    Osteoarthritis    Osteopenia    Problems with swallowing    Pulmonary hypertension (HCC)    Scoliosis    Sleep apnea    cpap    Thoracic kyphosis    Visual impairment      Past Surgical History:   Procedure Laterality Date    Appendectomy      Hernia surgery      Hip replacement surgery Left     Repair ing hernia,5+y/o,reducibl      Spine surgery procedure unlisted  2019    lumbar fusion    Spine surgery procedure unlisted      LUMBAR SURGERY X 2        ALL:  Allergies[1]     escitalopram, 10 mg, Daily  famotidine, 20 mg, BID  finasteride, 5 mg, Daily  gabapentin, 800 mg, Daily  [Held by provider] Mirabegron ER, 50 mg, Daily  oxybutynin ER, 10 mg, Daily  fluticasone-salmeterol, 1 puff, BID  enoxaparin, 40 mg, Daily  cefTRIAXone, 2 g, Q24H  azithromycin, 500 mg, Daily        Social History     Tobacco Use    Smoking status: Former     Types: Cigarettes    Smokeless tobacco: Never   Substance Use Topics    Alcohol use: Yes     Alcohol/week: 14.0 standard drinks of alcohol     Types: 14 Standard drinks or equivalent per week        Fam Hx  History  reviewed. No pertinent family history.    Review of Systems  Comprehensive ROS reviewed and negative except for what's stated above. \    OBJECTIVE:  /62 (BP Location: Right arm)   Pulse 78   Temp 97.9 °F (36.6 °C) (Oral)   Resp 18   Ht 5' 6\" (1.676 m)   Wt 188 lb (85.3 kg)   SpO2 97%   BMI 30.34 kg/m²     General:  Alert, no distress, appears stated age.    Head:  Normocephalic, without obvious abnormality, atraumatic.   Eyes:  Sclera anicteric, EOMs intact.    Nose: Nares normal,  Mucosa normal    Throat: Lips normal   Neck: Supple, symmetrical, trachea midline   Lungs:   Dec b/s b/l    Chest wall:  No tenderness or deformity   Heart:  Regular rate and rhythm, S1, S2 normal, no murmur, rub or gallop appreciated   Abdomen:   Soft, NT/ND, Bowel sounds normal. No masses,  No organomegaly.    Extremities/MSK: Extremities normal/normal movement, atraumatic, no cyanosis  or edema.   Skin: Skin color, texture, turgor normal. No rashes or lesions.    Neurologic: Moving all extremities spontaneously, no focal deficit appreciated          LABS:   Lab Results   Component Value Date    WBC 8.6 03/29/2025    HGB 14.4 03/29/2025    HCT 41.0 03/29/2025    .0 03/29/2025    CREATSERUM 0.85 03/29/2025    BUN 11 03/29/2025     03/29/2025    K 3.8 03/29/2025     03/29/2025    CO2 24.0 03/29/2025    GLU 94 03/29/2025    CA 9.4 03/29/2025    ALB 4.3 03/28/2025    ALKPHO 89 03/28/2025    BILT 1.7 03/28/2025    TP 6.9 03/28/2025    AST 31 03/28/2025    ALT 12 03/28/2025    MG 1.9 03/29/2025    PGLU 104 03/28/2025       Radiology: XR CHEST AP PORTABLE  (CPT=71045)    Result Date: 3/28/2025  PROCEDURE:  XR CHEST AP PORTABLE  (CPT=71045)  TECHNIQUE:  AP chest radiograph was obtained.  COMPARISON:  [facility] , XR, XR CHEST PA + LAT CHEST (OLS=95786), 2/04/2025, 4:09 PM.  INDICATIONS:  fallen a couple times today, no blood thinners, son reports increased weakness and unable to get up when he falls. recently  treated for UTI  PATIENT STATED HISTORY: (As transcribed by Technologist)  Patient offered no additional history at this time    FINDINGS:  There are dependent densities in lower lungs bilaterally.  Findings are unchanged on the left but increased on the right.  This could represent worsening multifocal atelectasis with superimposed pneumonia not excluded.  Heart size is unchanged.  Aorta is atherosclerotic.  Deformity multiple right posterior ribs are consistent with healed rib fractures.  There are multiple suture anchors in the right proximal humerus.            CONCLUSION:  There are dependent densities in lower lungs bilaterally.  This is increased on the right and could represent worsening atelectasis with superimposed pneumonia not excluded.   LOCATION:  [facility]      Dictated by (CST): Maykel Reyes MD on 3/28/2025 at 10:05 PM     Finalized by (CST): Maykel Reyes MD on 3/28/2025 at 10:05 PM       CT BRAIN OR HEAD (CPT=70450)    Result Date: 3/28/2025  PROCEDURE:  CT BRAIN OR HEAD ([id number])  COMPARISON:  [facility] , CT, CT BRAIN OR HEAD (79840), 5/01/2024, 9:50 PM.  INDICATIONS:  fallen a couple times today, no blood thinners, son reports increased weakness and unable to get up when he falls. recently treated for UTI, confusion, weakness  TECHNIQUE:  Noncontrast CT scanning is performed through the brain. Dose reduction techniques were used. Dose information is transmitted to the ACR (American College of Radiology) NRDR (National Radiology Data Registry) which includes the Dose Index Registry.  PATIENT STATED HISTORY: (As transcribed by Technologist)  Fallen a couple times today, no blood thinners, son reports increased weakness and unable to get up when he falls.    FINDINGS:  VENTRICLES/SULCI:  Stable moderate to severe atrophy.  No overt hydrocephalus. INTRACRANIAL:  Moderate to severe stable chronic deep white matter ischemic change.  No evidence of intracranial hemorrhage, mass or midline shift.  No  extra-axial fluid collections.  Marked vascular calcification of the skull base specially the left vertebral artery.  No dense MCA sign.  The basal cisterns are patent. SINUSES:           No sign of acute sinusitis.  MASTOIDS:          No sign of acute inflammation. SKULL:             No evidence for fracture or osseous abnormality. OTHER:             None.            CONCLUSION:  Stable chronic changes.  No acute disease.    LOCATION:  [place] [id number]   Dictated by (CST): Franki Cartagena MD on 3/28/2025 at 9:32 PM     Finalized by (CST): Franki Cartagena MD on 3/28/2025 at 9:35 PM            ASSESSMENT / PLAN:    80 year old male with PMH including but not limited to GERD, COPD, HTN, pHTN, MAL who p/t [facility] ED c weakness and possible PNA.       Weakness/falls  - unclear etiology  - PT/OT  - check echo, follows c Dr. Cheryl Topete, reports echo couple months ago but don't see in system   - follow orthostatics     Acute on Chronic Hypoxic resp failure   COPD  MAL  Asthma  -advair   -BD protocol   - uses 3L b/l c activity  - pulm following, apprec  - CPAP w/ sleep     PNA  - blood/sputum cx  - viral neg  - IV CTX/azitrho    # Major Depression/ Generalized anxiety d/o   -reviewed home meds, continue SSRI currently appears stable     # GERD  -H2B    BPH  - finasteride      # Obesity  - d/t excess calories  - encourage diet/exercise/wt loss    # Proph  - lovenox      DIspo: PMU  -Lives c wife at home        Outpatient records or previous hospital records reviewed. [facility] hospitalist to continue to follow patient while in house. A total of 75 minutes taken.  Greater than 50% face to face encounter.  D/w RN and Dr. Analia Treviño MD  [facility] Hospitalist  Pager: [phone]  3/29/2025  1:02 PM         [1]   Allergies  Allergen Reactions    Lisinopril ANGIOEDEMA and UNKNOWN    Tramadol ANGIOEDEMA and UNKNOWN    Codeine UNKNOWN

## 2025-03-30 LAB
ATRIAL RATE: 100 BPM
P AXIS: 40 DEGREES
P-R INTERVAL: 170 MS
Q-T INTERVAL: 328 MS
QRS DURATION: 68 MS
QTC CALCULATION (BEZET): 423 MS
R AXIS: 40 DEGREES
T AXIS: 29 DEGREES
VENTRICULAR RATE: 100 BPM

## 2025-03-30 NOTE — PLAN OF CARE
3/30 a/ox3, forgetful. On RA sat=94%, ambulates with rollaidor to bathroom, up on chair with chair alarm on. Pt is impulsive, call light within reach.per wife, she wants pt to go to rehab place if possible, she doesn't want to take him home and keeps falling. Told wife, we will have PT EVAL IN AM, and we go from there.     Problem: Patient/Family Goals  Goal: Patient/Family Long Term Goal  Description: Patient's Long Term Goal:   Discharge to room air  Interventions:- Follow plan of care  - See additional Care Plan goals for specific interventions  Outcome: Progressing  Goal: Patient/Family Short Term Goal  Description: Patient's Short Term Goal:   3/28 noc: maintain 02 needs  3/29 noc: wean 02 needs    Interventions: -   IV zosyn  - See additional Care Plan goals for specific interventions  Outcome: Progressing     Problem: RESPIRATORY - ADULT  Goal: Achieves optimal ventilation and oxygenation  Description: INTERVENTIONS:- Assess for changes in respiratory status- Assess for changes in mentation and behavior- Position to facilitate oxygenation and minimize respiratory effort- Oxygen supplementation based on oxygen saturation or ABGs- Provide Smoking Cessation handout, if applicable- Encourage broncho-pulmonary hygiene including cough, deep breathe, Incentive Spirometry- Assess the need for suctioning and perform as needed- Assess and instruct to report SOB or any respiratory difficulty- Respiratory Therapy support as indicated- Manage/alleviate anxiety- Monitor for signs/symptoms of CO2 retention  Outcome: Progressing

## 2025-03-30 NOTE — PROGRESS NOTES
[facility] Hospitalist Progress Note     PCP: Roberto Henderson MD    Chief Complaint: follow-up   Follow up for: The primary encounter diagnosis was Community acquired pneumonia of left lung, unspecified part of lung. A diagnosis of Hypoxia was also pertinent to this visit.    Overnight/Interim Events:      SUBJECTIVE:  Sitting In chair, denies weakness/LH/dizziness. Uses o2 at home if spo2 < 90%, uses cpap at night. walk    OBJECTIVE:  Temp:  [97.7 °F (36.5 °C)-98.9 °F (37.2 °C)] 98.1 °F (36.7 °C)  Pulse:  [82-91] 82  Resp:  [17-18] 17  BP: (129-150)/(50-95) 139/84  SpO2:  [90 %-94 %] 93 %    Intake/Output:    Intake/Output Summary (Last 24 hours) at 3/30/2025 1352  Last data filed at 3/30/2025 0741  Gross per 24 hour   Intake --   Output 1650 ml   Net -1650 ml       Last 3 Weights   03/29/25 0030 188 lb (85.3 kg)   03/28/25 2015 192 lb (87.1 kg)   05/01/24 2255 190 lb (86.2 kg)   05/01/24 1551 190 lb (86.2 kg)   11/21/23 0945 190 lb (86.2 kg)   11/06/23 1748 190 lb (86.2 kg)       Exam    General: Alert, no distress, appears stated age.     Head:  Normocephalic, without obvious abnormality, atraumatic.   Eyes:  Sclera anicteric, EOMs intact.    Nose: Nares normal,  Mucosa normal    Throat: Lips normal   Neck: Supple, symmetrical, trachea midline   Lungs:   Clear to auscultation bilaterally. Normal effort   Chest wall:  No tenderness or deformity   Heart:  Regular rate and rhythm, S1, S2 normal, no murmur, rub or gallop appreciated   Abdomen:   Soft, NT/ND, Bowel sounds normal. No masses,  No organomegaly.    Extremities: Extremities normal, atraumatic, no cyanosis or LE edema.   Skin: Skin color, texture, turgor normal. No rashes or lesions.    Neurologic: Moving all extremities spontaneously, no focal deficit appreciated      Data Review:       Labs:     Recent Labs   Lab 03/28/25 2037 03/29/25  0639   WBC 9.1 8.6   HGB 14.0 14.4   .3* 102.5*   .0 178.0       Recent Labs   Lab 03/28/25 2031 03/29/25  0643    * 139   K 4.0 3.8    103   CO2 26.0 24.0   BUN 13 11   CREATSERUM 0.89 0.85   CA 9.4 9.4   MG  --  1.9   * 94       Recent Labs   Lab 03/28/25 2037   ALT 12   AST 31   ALB 4.3       Recent Labs   Lab 03/28/25 2033   PGLU 104*       No results for input(s): \"TROP\" in the last 168 hours.      Meds:      escitalopram  10 mg Oral Daily    famotidine  20 mg Oral BID    finasteride  5 mg Oral Daily    gabapentin  800 mg Oral Daily    [Held by provider] Mirabegron ER  50 mg Oral Daily    oxybutynin ER  10 mg Oral Daily    fluticasone-salmeterol  1 puff Inhalation BID    enoxaparin  40 mg Subcutaneous Daily    cefTRIAXone  2 g Intravenous Q24H    azithromycin  500 mg Oral Daily      sodium chloride 125 mL/hr (03/29/25 0548)       acetaminophen    ipratropium-albuterol       Assessment/Plan:     80 year old male with PMH including but not limited to GERD, COPD, HTN, pHTN, MAL who p/t [facility] ED c weakness and possible PNA.         Weakness/falls  - unclear etiology  - PT/OT-->return home with home health PT  - check echo, follows c Dr. Cheryl Topete, reports echo couple months ago but don't see in system; consult if needed v close o/p f/u   - follow orthostatics      Acute on Chronic Hypoxic resp failure   COPD  MAL  Asthma  -advair   -BD protocol   - uses 3L b/l c activity per report, pt states Uses o2 at home if spo2 < 90%, uses cpap at night.   - pulm following, apprec, d/w Dr. Helms   - CPAP w/ sleep      PNA  - blood cx NG x1d/sputum cx  - viral neg  - IV CTX/azitrho  - urine strep/legionella neg      # Major Depression/ Generalized anxiety d/o   -reviewed home meds, continue SSRI currently appears stable      # GERD  -H2B     BPH  - finasteride        # Obesity  - d/t excess calories  - encourage diet/exercise/wt loss     # Proph  - lovenox        DIspo: PMU  -Lives c wife at home, concerned about safety/weakness  - SW c/s       Questions/concerns were discussed with patient by bedside. D/w RN      Total Time spent with patient and coordinating care:  55 minutes    Darrick Treviño MD  [facility] Hospitalist  [phone]  3/30/2025  1:52 PM

## 2025-03-30 NOTE — PROGRESS NOTES
Pulmonary Progress Note        NAME: Duke Garcia III - ROOM: [address] [place] [address] - MRN: [id number] - Age: 80 year old - : 1945        Last 24hrs: No events overnight, feels better, ambulating w/o O2 and maintaining his sats    OBJECTIVE:  Vitals:    25 0406 25 0427 25 0741 25 1200   BP: 130/50  (!) 140/95 139/84   BP Location: Left arm  Right arm Right arm   Pulse:  91 90 82   Resp:   18 17   Temp:   97.9 °F (36.6 °C) 98.1 °F (36.7 °C)   TempSrc:    Oral   SpO2: 91% 94% 94% 93%   Weight:       Height:           Oxygen Therapy  SpO2: 93 %  O2 Device: None (Room air)  O2 Flow Rate (L/min): 2 L/min  Pulse Oximetry Type: Continuous  Oximetry Probe Site Changed: No  Pulse Ox Probe Location: Right hand                  Intake/Output Summary (Last 24 hours) at 3/30/2025 1235  Last data filed at 3/30/2025 0741  Gross per 24 hour   Intake --   Output 1650 ml   Net -1650 ml       Scheduled Medication:   escitalopram  10 mg Oral Daily    famotidine  20 mg Oral BID    finasteride  5 mg Oral Daily    gabapentin  800 mg Oral Daily    [Held by provider] Mirabegron ER  50 mg Oral Daily    oxybutynin ER  10 mg Oral Daily    fluticasone-salmeterol  1 puff Inhalation BID    enoxaparin  40 mg Subcutaneous Daily    cefTRIAXone  2 g Intravenous Q24H    azithromycin  500 mg Oral Daily     Continuous Infusing Medication:   sodium chloride 125 mL/hr (25 0548)       Lungs: clear to auscultation bilaterally  Heart: S1, S2 normal, no murmur, click, rub or gallop, regular rate and rhythm  Abdomen: soft, non-tender; bowel sounds normal; no masses,  no organomegaly  Extremities: extremities normal, atraumatic, no cyanosis or edema    Labs reviewed as noted below      ASSESSMENT/PLAN:    Acute on Chronic Hypoxic resp failure  -due to below  -wean to baseline (3L w/ activity), currently not needing any O2 w/ activity  Lobar Pneumonia  -cont abx  -monitor cultures  Asthma  -advair for symbicort  -BD  protocol  CSA  -CPAP w/ sleep  Dispo  -improving, family concerned that pt is too weak to go home on his own and might benefit from ALYSSA, defer to primary      Jovanny Helms  [facility]  Pulmonary and Critical Care

## 2025-03-30 NOTE — PLAN OF CARE
Problem: PNA  Data: Ax04. Telemetry sinus rhythm. , on 1L . MAL- cpap. Afebrile. Verbalized general pain, prn given. Primofit. Saline lock. Up 1 walker. Bed alarm.   Intervention: IV rocephin, PO Zithromax, advair   Education: medications, call light   Response: Cooperative with care    Problem: Patient/Family Goals  Goal: Patient/Family Long Term Goal  Description: Patient's Long Term Goal:   Discharge to room air  Interventions:- Follow plan of care  - See additional Care Plan goals for specific interventions  Outcome: Progressing  Goal: Patient/Family Short Term Goal  Description: Patient's Short Term Goal:   3/28 noc: maintain 02 needs  3/29 noc: wean 02 needs    Interventions: -   IV zosyn  - See additional Care Plan goals for specific interventions  Outcome: Progressing     Problem: RESPIRATORY - ADULT  Goal: Achieves optimal ventilation and oxygenation  Description: INTERVENTIONS:- Assess for changes in respiratory status- Assess for changes in mentation and behavior- Position to facilitate oxygenation and minimize respiratory effort- Oxygen supplementation based on oxygen saturation or ABGs- Provide Smoking Cessation handout, if applicable- Encourage broncho-pulmonary hygiene including cough, deep breathe, Incentive Spirometry- Assess the need for suctioning and perform as needed- Assess and instruct to report SOB or any respiratory difficulty- Respiratory Therapy support as indicated- Manage/alleviate anxiety- Monitor for signs/symptoms of CO2 retention  Outcome: Progressing

## 2025-03-31 ENCOUNTER — APPOINTMENT (OUTPATIENT)
Dept: CV DIAGNOSTICS | Facility: HOSPITAL | Age: 80
End: 2025-03-31
Attending: INTERNAL MEDICINE
Payer: MEDICARE

## 2025-03-31 VITALS
HEART RATE: 72 BPM | HEIGHT: 66 IN | OXYGEN SATURATION: 95 % | RESPIRATION RATE: 17 BRPM | SYSTOLIC BLOOD PRESSURE: 144 MMHG | TEMPERATURE: 98 F | BODY MASS INDEX: 30.22 KG/M2 | WEIGHT: 188 LBS | DIASTOLIC BLOOD PRESSURE: 83 MMHG

## 2025-03-31 PROCEDURE — 97535 SELF CARE MNGMENT TRAINING: CPT

## 2025-03-31 PROCEDURE — 97530 THERAPEUTIC ACTIVITIES: CPT

## 2025-03-31 PROCEDURE — 97110 THERAPEUTIC EXERCISES: CPT

## 2025-03-31 PROCEDURE — 97165 OT EVAL LOW COMPLEX 30 MIN: CPT

## 2025-03-31 RX ORDER — CEFDINIR 300 MG/1
300 CAPSULE ORAL 2 TIMES DAILY
Qty: 6 CAPSULE | Refills: 0 | Status: SHIPPED | OUTPATIENT
Start: 2025-03-31 | End: 2025-04-03

## 2025-03-31 NOTE — PLAN OF CARE
Problem: Patient/Family Goals  Goal: Patient/Family Long Term Goal  Description: Patient's Long Term Goal: discharge with adequate resources    Interventions:  - Identify barriers to discharge w/pt and caregiver  - Include patient/family/discharge partner in discharge planning  - Arrange for needed discharge resources and transportation as appropriate  - Identify discharge learning needs   - Consider post-discharge preferences of patient/family/discharge partner  - Assess patient's ability to be responsible for managing their own health  - Refer to Case Management Department for coordinating discharge planning if the patient needs post-hospital services  - Follow plan of care  - See additional Care Plan goals for specific interventions  Outcome: Progressing  Goal: Patient/Family Short Term Goal  Description: Patient's Short Term Goal:   3/28 noc: maintain 02 needs  3/29 noc: wean 02 needs  3/31: PT re-eval    Interventions: -   IV zosyn  - See additional Care Plan goals for specific interventions  Outcome: Progressing     Problem: RESPIRATORY - ADULT  Goal: Achieves optimal ventilation and oxygenation  Description: INTERVENTIONS:- Assess for changes in respiratory status- Assess for changes in mentation and behavior- Position to facilitate oxygenation and minimize respiratory effort- Oxygen supplementation based on oxygen saturation or ABGs- Provide Smoking Cessation handout, if applicable- Encourage broncho-pulmonary hygiene including cough, deep breathe, Incentive Spirometry- Assess the need for suctioning and perform as needed- Assess and instruct to report SOB or any respiratory difficulty- Respiratory Therapy support as indicated- Manage/alleviate anxiety- Monitor for signs/symptoms of CO2 retention  Outcome: Progressing

## 2025-03-31 NOTE — PLAN OF CARE
A&Ox4, glasses, bilateral hearing aides. VSS. RA while awake, uses home CPAP at HS.   GI: No concerns reported, continent.  : Up to the restroom for voids, has a primofit on for urinary incontinence.  He reports that his left shoulder hurts - prn tylenol given and heat packs as requested.  Up with standby assistance and a walker or home rollator.  Diet: Regular Cardiac diet.  IV - receiving IV ceftriaxone, completed PO azithromycin tonight.  All appropriate safety measures in place. All questions and concerns addressed to the best of my ability.    Plan for PT Eval later today. Patient's wife is nervous to take him home as he had fallen multiple times at home and again in the ER and she is unable to help him once he has fallen.

## 2025-03-31 NOTE — CM/SW NOTE
03/31/25 1200   CM/SW Referral Data   Referral Source Social Work (self-referral)   Reason for Referral Discharge planning   Informant EMR;Clinical Staff Member;Spouse/Significant Other   Medical Hx   Does patient have an established PCP? Yes   Patient Info   Patient's Home Environment Condo/Apt with elevator   Patient lives with Spouse/Significant other   Discharge Needs   Anticipated D/C needs Home health care;To be determined     HOME SITUATION  Type of Home: Apartment (3rd fl)  Home Layout: Elevator      Lives With: Spouse    Drives: No   Patient Regularly Uses: Rolling walker, Gait belt       Prior Level of [place]: per pt, grossly mod IND prior to admission. Uses rollator. 3rd fl apartment, elevator in his building. Lives with spouse who drives. Walk in shower with grab bars. 4 falls in past year.   (Per PT evaluation)      Patient is a 79 y/o male who admitted with Hypoxia, Community acquired pneumonia of left lung.   Anticipated therapy need: Home with Home Healthcare    Attempted to meet with patient however he was sleeping.    Spoke with patient's wife (Jennifer) to discuss discharge planning. They live in a senior living apartment in [place]. They have very supportive family. Their son lives near by but currently out of town and another son + grandsons a little further away that could come help them. He was going to outpatient PT prior to admission, his therapist is out of town right now. Discussed HH, she is interested in HH short tem. Offered caregiver services, added to AVS. She may have family come help before hiring a caregiver. He has a CPAP and home oxygen through [facility], a concentrator and 2 POCs.    Sent HH referral on aidin. Await accepting providers.     SW/CM to remain available for support and/or discharge planning.    Addendum 3pm: Spoke with wife who is aware of discharge today. She will pick patient up. Left accepting HH list with patient for them to review. Await HH choice, they will  call later with choice.       Augusta SANCHEZ, [facility]  Discharge Planner

## 2025-03-31 NOTE — PHYSICAL THERAPY NOTE
PHYSICAL THERAPY TREATMENT NOTE - INPATIENT    Room Number: 527/527-A     Session: 1     Number of Visits to Meet Established Goals: 4    Presenting Problem: admit s/p falls, weakness  Co-Morbidities : asthma, pHTN, chronic respiratory failure    PHYSICAL THERAPY MEDICAL/SOCIAL HISTORY  History related to current admission: Patient is a 80 year old male admitted on 3/28/2025 from home for weakness, s/p fall.  Pt diagnosed with PNA.     HOME SITUATION  Type of Home: Apartment (3rd fl)  Home Layout: Elevator                  Lives With: Spouse    Drives: No   Patient Regularly Uses: Rolling walker, Gait belt       Prior Level of [place]: per pt, grossly mod IND prior to admission. Uses rollator. 3rd fl apartment, elevator in his building. Lives with spouse who drives. Walk in shower with grab bars. 4 falls in past year.    PHYSICAL THERAPY ASSESSMENT   Patient demonstrates good  progress this session, goals   partially met with supervision .      Patient is requiring supervision as a result of the following impairments: decreased functional strength, decreased endurance/aerobic capacity, and impaired   balance.     Patient currently does not meet criteria for skilled inpatient physical therapy services, however patient will remain on Inpatient Mobility Team and will continue with ambulation to maintain current level of mobility.   The rehab aide will perform treatment activities prescribed by this physical therapist. The rehab aide will communicate with overseeing PT regarding any change in functional mobility. RN aware.     Patient continues to benefit from continued skilled PT services: at discharge to promote prior level of function and safety with additional support and return home with home health PT.    PLAN DURING HOSPITALIZATION  Nursing Mobility Recommendation : 1 Assist  PT Device Recommendation: Rolling walker, Gait belt  PT Treatment Plan: Body mechanics, Bed mobility, Endurance, Energy conservation,  Patient education, Family education, Gait training, Neuromuscular re-educate, Range of motion, Strengthening, Stoop training, Stair training, Transfer training, Balance training  Frequency (Obs): 3x/week     CURRENT GOALS     Goal #1 Patient is able to demonstrate supine - sit EOB @ level: modified independent      Goal #2 Patient is able to demonstrate transfers Sit to/from Stand at assistance level: modified independent      Goal #3 Patient is able to ambulate 75 feet with assist device: walker - rolling at assistance level: modified independent      Goal #4     Goal #5     Goal #6     Goal Comments: Goals established on 3/29/2025  3/31/2025 all goals partially met at supervision.     SUBJECTIVE  \"I slept good last night\"    OBJECTIVE       WEIGHT BEARING RESTRICTION     PAIN ASSESSMENT   Ratin  Location: L shoulder  Management Techniques: Activity promotion, Relaxation, Repositioning    BALANCE                                                                                                                       Static Sitting: Good  Dynamic Sitting: Good           Static Standing: Fair -  Dynamic Standing: Fair -    ACTIVITY TOLERANCE   Pt denies dizziness/lightheadedness throughout the session. Pt SpO2 after ambulation 89-90%.                       O2 WALK       AM-PAC '6-Clicks' INPATIENT SHORT FORM - BASIC MOBILITY  How much difficulty does the patient currently have...  Patient Difficulty: Turning over in bed (including adjusting bedclothes, sheets and blankets)?: None   Patient Difficulty: Sitting down on and standing up from a chair with arms (e.g., wheelchair, bedside commode, etc.): A Little   Patient Difficulty: Moving from lying on back to sitting on the side of the bed?: A Little   How much help from another person does the patient currently need...   Help from Another: Moving to and from a bed to a chair (including a wheelchair)?: A Little   Help from Another: Need to walk in hospital room?: A  Little   Help from Another: Climbing 3-5 steps with a railing?: A Little     AM-PAC Score:  Raw Score: 19   Approx Degree of Impairment: 41.77%   Standardized Score (AM-PAC Scale): 45.44   CMS Modifier (G-Code): CK    FUNCTIONAL ABILITY STATUS  Gait Assessment   Functional Mobility/Gait Assessment  Gait Assistance: Contact guard assist  Distance (ft): 200  Assistive Device: Rolling walker  Pattern: Shuffle    Skilled Therapy Provided: Per RN, okay for pt to be seen. Pt received in chair and agreeable to PT session.     Bed Mobility:  Rolling: NT   Supine<>Sit: NT   Sit<>Supine: NT     Transfer Mobility:  Sit<>Stand: Supervision   Stand<>Sit: Supervision   Gait: Supervision with RW, pt required verbal cuing to stay within the frame of the RW when turning for safety    Therapist's Comments: Pt was educated on goals for the session and safety. Pt demonstrates improvement in ambulation with further distance.       THERAPEUTIC EXERCISES  Lower Extremity Alternating marching  Ankle pumps  Knee extension     Upper Extremity Shoulder flex     Position Sitting     Repetitions   10   Sets   1     Patient End of Session: Up in chair, With  staff, Needs met, Call light within reach, RN aware of session/findings, All patient questions and concerns addressed, Hospital anti-slip socks, Alarm set    PT Session Time: 23 minutes  Therapeutic Activity: 15 minutes  Therapeutic Exercise: 8 minutes

## 2025-03-31 NOTE — PROGRESS NOTES
Orthostatic vitals taken this morning for review       03/31/25 0458 03/31/25 0504 03/31/25 0510   Vital Signs   BP (!) 183/102 153/85 136/77   MAP (mmHg) (!) 120 98 92   BP Location Right arm Right arm Right arm   BP Method Automatic Automatic Automatic   Patient Position Lying Sitting Standing

## 2025-03-31 NOTE — DISCHARGE SUMMARY
[facility] Internal Medicine Discharge Summary   Patient ID:  Duke Garcia III  [id number]  80 year old  2/25/1945    Admit date: 3/28/2025    Discharge date and time: 3/31/2025     Attending Physician: Darrick Treviño MD     Primary Care Physician: Roberto Henderson MD     Admit Dx: Hypoxia [R09.02]  Community acquired pneumonia of left lung, unspecified part of lung [J18.9]    Hospital Discharge Diagnoses:  PNA    Disposition: home C HH as needed     Important follow up:  -PCP in [x] within 7 days [] within 14 days [] other   Roberto Henderson MD  [address]  [address]  [facility] [id number] [phone]    Schedule an appointment as soon as possible for a visit in 3 day(s)  post hospitalization follow up    Anand Helms IV, MD  [address]  [address]  [facility] [address] [phone]    Follow up in 2 week(s)        Hospital Course:   80 year old male with PMH including but not limited to GERD, COPD, HTN, pHTN, MAL who p/t [facility] ED c weakness and possible PNA.         Weakness/falls  - unclear etiology  - PT/OT-->return home with home health PT  - check echo, follows c Dr. Cheryl Topete, noted reviewed 3/17  close o/p f/u, has appt 8/5 but told to move up     HTN: Controlled on medical therapy.  2. HL: LDL diet controlled. Continue to monitor.  3. ILD/COPD/Asthma: Stable on medical therapy. Followed by Dr. Benitez.    - follow orthostatics   - TTE 3/6 EF 60%, nl LVEF, no WMA       Acute on Chronic Hypoxic resp failure   COPD  MAL  Asthma  -advair   -BD protocol   - uses 3L b/l c activity per report, pt states Uses o2 at home if spo2 < 90%, uses cpap at night.   - pulm following, apprec, d/w Dr. Helms   - CPAP w/ sleep      PNA  - blood cx NG x1d/sputum cx  - viral neg  - IV CTX/azitrho; cefdinir at discharge   - urine strep/legionella neg         # Major Depression/ Generalized anxiety d/o   -reviewed home meds, continue SSRI currently appears stable      # GERD  -H2B     BPH  - finasteride        #  Obesity  - d/t excess calories  - encourage diet/exercise/wt loss     # Proph  - lovenox        DIspo: PMU  -Lives c wife at home, concerned about safety/weakness  - SW c/s apprec:  They live in a senior living apartment in [place]. They have very supportive family. Their son lives near by but currently out of town and another son + grandsons a little further away that could come help them. He was going to outpatient PT prior to admission, his therapist is out of town right now. Discussed HH, she is interested in  short tem. Offered caregiver services,        Day of discharge Exam   Vitals:    03/31/25 1215   BP: 144/83   Pulse: 72   Resp: 17   Temp: 97.9 °F (36.6 °C)       Exam on day of discharge:  No sxs, no sob/cp/cough. BP fluctuates    Gen: No acute distress, alert and oriented  CV: RRR, +s1/s2  Lungs: CTAB, good respiratory effort  Abdomen: s/nt/nd  Ext: Moves all 4 extremities, no c/c/e  Neuro: CN Intact, no focal deficits      Discharge meds     Medication List        START taking these medications      cefdinir 300 MG Caps  Commonly known as: Omnicef  Take 1 capsule (300 mg total) by mouth 2 (two) times daily for 3 days.            CONTINUE taking these medications      albuterol (5 MG/ML) 0.5% Nebu  Commonly known as: Ventolin     Budesonide-Formoterol Fumarate 160-4.5 MCG/ACT Aero  Commonly known as: SYMBICORT     buPROPion 75 MG Tabs  Commonly known as: Wellbutrin     celecoxib 200 MG Caps  Commonly known as: CeleBREX     cholecalciferol 50 MCG (2000 UT) Caps  Commonly known as: Vitamin D3     clobetasol 0.05 % Crea  Commonly known as: Temovate     escitalopram 10 MG Tabs  Commonly known as: Lexapro     famotidine 20 MG Tabs  Commonly known as: Pepcid     fexofenadine 180 MG Tabs  Commonly known as: Allegra     finasteride 5 MG Tabs  Commonly known as: Proscar  Take 1 tablet (5 mg total) by mouth daily.     gabapentin 400 MG Caps  Commonly known as: Neurontin     hydrocortisone 2.5 % Crea      Mirabegron ER 50 MG Tb24  Commonly known as: Myrbetriq  Take 1 tablet (50 mg total) by mouth daily.     montelukast 10 MG Tabs  Commonly known as: Singulair     MULTIPLE VITAMIN OR     polyethylene glycol (PEG 3350) 17 g Pack  Commonly known as: Miralax     Solifenacin Succinate 10 MG Tabs  Commonly known as: VESICARE  Take 1 tablet (10 mg total) by mouth daily.     Tadalafil 20 MG Tabs  Commonly known as: Cialis  Take 1 tablet (20 mg total) by mouth daily as needed for Erectile Dysfunction.     tamsulosin 0.4 MG Caps  Commonly known as: Flomax     triamcinolone 55 MCG/ACT Aero  Commonly known as: Nasacort            STOP taking these medications      azithromycin 250 MG Tabs  Commonly known as: Zithromax               Where to Get Your Medications        These medications were sent to [place] [address] [place] [facility] [address] [facility] [phone], [phone] [address] [facility] [address]      Phone: [phone]   cefdinir 300 MG Caps         Consults: IP CONSULT TO HOSPITALIST  IP CONSULT TO PULMONOLOGY  IP CONSULT TO SOCIAL WORK  IP CONSULT TO SOCIAL WORK    Radiology: XR CHEST AP PORTABLE  (CPT=71045)    Result Date: 3/28/2025  PROCEDURE:  XR CHEST AP PORTABLE  (CPT=71045)  TECHNIQUE:  AP chest radiograph was obtained.  COMPARISON:  [facility], XR CHEST PA + LAT CHEST (TUX=12613), 2/04/2025, 4:09 PM.  INDICATIONS:  fallen a couple times today, no blood thinners, son reports increased weakness and unable to get up when he falls. recently treated for UTI  PATIENT STATED HISTORY: (As transcribed by Technologist)  Patient offered no additional history at this time    FINDINGS:  There are dependent densities in lower lungs bilaterally.  Findings are unchanged on the left but increased on the right.  This could represent worsening multifocal atelectasis with superimposed pneumonia not excluded.  Heart size is unchanged.  Aorta is atherosclerotic.  Deformity multiple right posterior ribs are  consistent with healed rib fractures.  There are multiple suture anchors in the right proximal humerus.            CONCLUSION:  There are dependent densities in lower lungs bilaterally.  This is increased on the right and could represent worsening atelectasis with superimposed pneumonia not excluded.   LOCATION:  [facility]      Dictated by (CST): Maykel Reyes MD on 3/28/2025 at 10:05 PM     Finalized by (CST): Maykel Reyes MD on 3/28/2025 at 10:05 PM       CT BRAIN OR HEAD (CPT=70450)    Result Date: 3/28/2025  PROCEDURE:  CT BRAIN OR HEAD (26661)  COMPARISON:  [facility] , CT, CT BRAIN OR HEAD (38492), 5/01/2024, 9:50 PM.  INDICATIONS:  fallen a couple times today, no blood thinners, son reports increased weakness and unable to get up when he falls. recently treated for UTI, confusion, weakness  TECHNIQUE:  Noncontrast CT scanning is performed through the brain. Dose reduction techniques were used. Dose information is transmitted to the ACR (American College of Radiology) NRDR (National Radiology Data Registry) which includes the Dose Index Registry.  PATIENT STATED HISTORY: (As transcribed by Technologist)  Fallen a couple times today, no blood thinners, son reports increased weakness and unable to get up when he falls.    FINDINGS:  VENTRICLES/SULCI:  Stable moderate to severe atrophy.  No overt hydrocephalus. INTRACRANIAL:  Moderate to severe stable chronic deep white matter ischemic change.  No evidence of intracranial hemorrhage, mass or midline shift.  No extra-axial fluid collections.  Marked vascular calcification of the skull base specially the left vertebral artery.  No dense MCA sign.  The basal cisterns are patent. SINUSES:           No sign of acute sinusitis.  MASTOIDS:          No sign of acute inflammation. SKULL:             No evidence for fracture or osseous abnormality. OTHER:             None.            CONCLUSION:  Stable chronic changes.  No acute disease.    LOCATION:  [place] [address]   Dictated by  (CST): Franki Cartagena MD on 3/28/2025 at 9:32 PM     Finalized by (CST): Franki Cartagena MD on 3/28/2025 at 9:35 PM          Operative Procedures:      Code Status: No Order    Total Time Coordinating Care: Greater than 30 minutes    Patient had opportunity to ask questions and state understand and agree with therapeutic plan as outlined. I reviewed and reconciled current and discharge medications on day of discharge and discussed meds with patient. D/w RN     Darrick Treviño MD  [facility] Hospitalist  [phone]  3/31/2025  2:24 PM

## 2025-03-31 NOTE — PROGRESS NOTES
S: He is feeling ok today. He is not having too much shortness of breath or cough.    Meds:   escitalopram  10 mg Oral Daily    famotidine  20 mg Oral BID    finasteride  5 mg Oral Daily    gabapentin  800 mg Oral Daily    [Held by provider] Mirabegron ER  50 mg Oral Daily    oxybutynin ER  10 mg Oral Daily    fluticasone-salmeterol  1 puff Inhalation BID    enoxaparin  40 mg Subcutaneous Daily    cefTRIAXone  2 g Intravenous Q24H       Prn Meds:    acetaminophen    ipratropium-albuterol    Infusions:   sodium chloride 125 mL/hr (03/29/25 0548)       OBJECTIVE:  Vitals:    03/31/25 0458 03/31/25 0504 03/31/25 0510 03/31/25 0730   BP: (!) 183/102 153/85 136/77 128/81   Pulse: 80 78 81 71   Resp: 18   18   Temp: 97.5 °F (36.4 °C)   98 °F (36.7 °C)   TempSrc: Oral   Oral   SpO2: 95% 96% 95% 93%   Weight:       Height:         O2: room air  Gen - Alert, oriented x 3, in no apparent distress  Lungs - CTAB  CV - regular rate & rhythm. Normal S1, S2. No murmurs   Extremities - No cyanosis, clubbing, edema appreciated.        Labs:  Recent Labs   Lab 03/28/25 2037 03/29/25  0639   WBC 9.1 8.6   HGB 14.0 14.4   .0 178.0     Recent Labs   Lab 03/28/25 2037 03/29/25  0639   * 139   K 4.0 3.8    103   CO2 26.0 24.0   BUN 13 11   CREATSERUM 0.89 0.85   * 94   ANIONGAP 9 12   ALB 4.3  --    CA 9.4 9.4   MG  --  1.9   TP 6.9  --      Recent Labs   Lab 03/28/25 2037   ALKPHO 89   AST 31   ALT 12   BILT 1.7*     Recent Labs   Lab 03/28/25 2037   TROPHS 9     Recent Labs   Lab 03/28/25 2037   PCT 0.18*       Recent Labs   Lab 03/28/25  2043 03/29/25  0601   COVID19 Not Detected  --    INFAPCR Negative  --    INFBPCR Negative  --    RSV Negative  --    STREPPNEUMAG  --  Negative   LEGIONAG  --  Negative       Imaging reviewed    ASSESSMENT AND PLAN      Duke Garcia III is a 80 year old man with a history of chronic hypoxemic respiratory failure, asthma, pulmonary hypertension,  complex sleep apnea who presented with falls and weakness    Acute on Chronic Hypoxic resp failure - due to pneumonia in a patient with asthma and complex sleep apnea  -supplemental oxygen prn; he's currently on room air. Patient is on 3L O2 with activity at baseline    -antibiotics as below  Pneumonia  -cont ceftriaxone (3/28-); change to cefdinir at hospital discharge   -patient already completed 3 days of azithro  -monitor cultures  Asthma - no signs of acute exacerbation   -advair in lieu of home symbicort  -BD protocol  -outpatient follow up with Dr. Helms  Complex sleep apnea  -CPAP w/ sleep  Proph  -[facility]  Dispo  -no objection to discharge from a pulmonary perspective. He can follow up in 1-2 weeks.       Sancho Chung M.D.  Pulmonary/Critical Care

## 2025-03-31 NOTE — PROGRESS NOTES
NURSING DISCHARGE NOTE    Discharged Home via Wheelchair.  Accompanied by Support staff  Belongings Taken by patient/family.    Discharge education reviewed with pt and pt wife. Disease information provided and discussed. New medication and follow ups discussed. All questions and concerns addressed, pt and pt wife verbalized understanding. Discharge packet prepared and sent with patient. PIV and tele removed. Pt ready for discharge.

## 2025-03-31 NOTE — DISCHARGE INSTRUCTIONS
For assistance in finding in home care please call:  Greta at [facility] [phone] Email [email or web address]  [facility] [phone]  [facility]  [phone]

## 2025-03-31 NOTE — OCCUPATIONAL THERAPY NOTE
OCCUPATIONAL THERAPY EVALUATION - INPATIENT    Room Number: 527/527-A  Evaluation Date: 3/31/2025     Type of Evaluation: Initial  Presenting Problem: Community acquired pneumonia of L lung, unspecified part of lung    Physician Order: IP Consult to Occupational Therapy  Reason for Therapy:  ADL/IADL Dysfunction and Discharge Planning    OCCUPATIONAL THERAPY ASSESSMENT   Patient is a 80 year old male admitted on 3/28/2025 with Presenting Problem: Community acquired pneumonia of L lung, unspecified part of lung. Co-Morbidities : asthma, pHTN, chronic respiratory failure  Patient is currently functioning near baseline with toileting, lower body dressing, grooming, bed mobility, transfers, static sitting balance, dynamic sitting balance, static standing balance, dynamic standing balance, maintaining seated position, functional standing tolerance, energy conservation strategies, and aerobic capacity.  Prior to admission, patient's baseline is modIND with ADLs/IADLs.  Patient met all OT goals at supervision level. Anticipate pt to reach baseline at discharge with continued OOB mobility and function with nursing staff.  Patient reports no further questions/concerns at this time.     Patient will benefit from continued skilled OT Services with no additional skilled services but increased support at home    Recommendations for nursing staff:   Transfers: up x 1 assist, supervision-SBA, RW  Toileting location: Toilet    EVALUATION SESSION:  Patient at start of session: Seated upright in chair    FUNCTIONAL TRANSFER ASSESSMENT  Sit to Stand: Chair  Chair: Supervision  Toilet Transfer: Supervision    BED MOBILITY  Sit to Supine (OT): Supervision    BALANCE ASSESSMENT  Static Sitting: Supervision  Static Standing: Supervision    FUNCTIONAL ADL ASSESSMENT  Grooming Standing: Supervision (brushed teeth/washed face at sink)  LB Dressing Standing: Supervision (donned/doffed brief standing at toilet)  Toileting Seated:  Supervision    ACTIVITY TOLERANCE: WFL                         O2 SATURATIONS       COGNITION  Overall Cognitive Status:  WFL - within functional limits    COGNITION ASSESSMENTS     Upper Extremity:   ROM: within functional limits-pt reports previous L torn rotator cuff injury, however did not appear to affect ability to complete ADLs this session.  Strength: is within functional limits    EDUCATION PROVIDED  Patient Education : Role of Occupational Therapy; Plan of Care; Functional Transfer Techniques; Fall Prevention; Posture/Positioning; Energy Conservation; Proper Body Mechanics  Patient's Response to Education: Verbalized Understanding; Returned Demonstration    Equipment used: RW  Demonstrates functional use    Therapist comments: RN cleared pt for session, received sitting upright in chair. Pt tolerated dynamic standing activity from chair > toilet > sink > chair, demonstrated good dynamic standing balance during activity. Pt completed toileting task with supervision as well as standing grooming task at sink with no loss of balance noted. Pt safely returned to semi supine in bed at end of session. Pt encouraged to continue with OOB mobility and function with nursing staff to prevent further deconditioning while in house; pt verbalized understanding and in agreement. Pt reports his wife is able to assist as needed with ADLs once home.    Patient End of Session: In bed, Needs met, With  staff, Call light within reach, RN aware of session/findings, All patient questions and concerns addressed, Hospital anti-slip socks, Alarm set    OCCUPATIONAL PROFILE    HOME SITUATION  Type of Home: Apartment (3rd fl)  Home Layout: Elevator  Lives With: Spouse    Toilet and Equipment: Standard height toilet, Grab bar  Shower/Tub and Equipment: Walk-in shower, Grab bar       Occupation/Status: IND with ADLs/IADLs  Hand Dominance: Right  Drives: No (spouse does driving, doesn't drive anymore due to error with cataract surgery  ~1 year ago)  Patient Regularly Uses: Glasses, Rollator    Prior Level of Function: modIND with ADLs/IADLs, uses rollator for mobility. Pt reports he enjoys spending time with his grandkids.    SUBJECTIVE  \"I forgot to mention.....\"    PAIN ASSESSMENT  Rating: 3  Location: L shoulder-previous rotator cuff tear       OBJECTIVE  Precautions: Bed/chair alarm  Fall Risk: Standard fall risk    WEIGHT BEARING RESTRICTION       AM-PAC ‘6-Clicks’ Inpatient Daily Activity Short Form  -   Putting on and taking off regular lower body clothing?: None  -   Bathing (including washing, rinsing, drying)?: None  -   Toileting, which includes using toilet, bedpan or urinal? : None  -   Putting on and taking off regular upper body clothing?: None  -   Taking care of personal grooming such as brushing teeth?: None  -   Eating meals?: None    AM-PAC Score:  Score: 24  Approx Degree of Impairment: 0%  Standardized Score (AM-PAC Scale): 57.54    ADDITIONAL TESTS     NEUROLOGICAL FINDINGS      PLAN   Patient has been evaluated and presents with no skilled Occupational Therapy needs at this time.  Patient discharged from Occupational Therapy services.  Please re-order if a new functional limitation presents during this admission.    OT Device Recommendations: None    Patient Evaluation Complexity Level:   Occupational Profile/Medical History LOW - Brief history including review of medical or therapy records    Specific performance deficits impacting engagement in ADL/IADL LOW  1 - 3 performance deficits    Client Assessment/Performance Deficits LOW - No comorbidities nor modifications of tasks    Clinical Decision Making LOW - Analysis of occupational profile, problem-focused assessments, limited treatment options    Overall Complexity LOW     OT Session Time: 20 minutes  Self-Care Home Management: 12 minutes

## 2025-04-01 NOTE — PAYOR COMM NOTE
--------------  DISCHARGE REVIEW    Payor: [employer]  Subscriber #:  [id number]  Authorization Number: [id number]    Admit date: 3/29/25  Admit time:  12:24 AM  Discharge Date: 3/31/2025  4:39 PM     Admitting Physician: Darrick Treviño MD  Primary Care Physician: Roberto Henderson MD    Discharge Summary signed by Darrick Treviño MD at 3/31/2025  2:30 PM       Author: Darrick Treviño MD Specialty: HOSPITALIST, Internal Medicine Author Type: Physician    Filed: 3/31/2025  2:30 PM Date of Service: 3/31/2025  2:24 PM Status: Signed     [facility] [place] Internal Medicine Discharge Summary   Patient ID:  [id number]  80 year old  2/25/1945  Admit date: 3/28/2025  Discharge date and time: 3/31/2025  Attending Physician: Darrick Treviño MD   Primary Care Physician: Roberto Henderson MD     Admit Dx: Hypoxia [R09.02]  Community acquired pneumonia of left lung, unspecified part of lung [J18.9]    Hospital Discharge Diagnoses:  PNA    Disposition: home C HH as needed   Important follow up:  -PCP in [x] within 7 days [] within 14 days [] other   Roberto Henderson MD  [address]   [address]  [facility] [address] [phone]  Schedule an appointment as soon as possible for a visit in 3 day(s)  post hospitalization follow up    Anand Helms IV, MD  [address]  [address]  [facility] [address] [phone]  Follow up in 2 week(s)    Hospital Course:   80 year old male with PMH including but not limited to GERD, COPD, HTN, pHTN, MAL who p/t EH ED c weakness and possible PNA.      Weakness/falls  - unclear etiology  - PT/OT-->return home with home health PT  - check echo, follows c Dr. Cheryl Topete, noted reviewed 3/17  close o/p f/u, has appt 8/5 but told to move up     HTN: Controlled on medical therapy.  2. HL: LDL diet controlled. Continue to monitor.  3. ILD/COPD/Asthma: Stable on medical therapy. Followed by Dr. Benitez.    - follow orthostatics   - TTE 3/6 EF 60%, nl LVEF, no WMA       Acute on Chronic  Hypoxic resp failure   COPD  MAL  Asthma  -advair   -BD protocol   - uses 3L b/l c activity per report, pt states Uses o2 at home if spo2 < 90%, uses cpap at night.   - pulm following, apprec, d/w Dr. Helms   - CPAP w/ sleep      PNA  - blood cx NG x1d/sputum cx  - viral neg  - IV CTX/azitrho; cefdinir at discharge   - urine strep/legionella neg      # GERD  -H2B     BPH  - finasteride        # Obesity  - d/t excess calories  - encourage diet/exercise/wt loss     # Proph  - lovenox     DIspo: PMU  -Lives c wife at home, concerned about safety/weakness  - SW c/s apprec:  They live in a senior living apartment in [place]. They have very supportive family. Their son lives near by but currently out of town and another son + grandsons a little further away that could come help them. He was going to outpatient PT prior to admission, his therapist is out of town right now. Discussed HH, she is interested in  short tem. Offered caregiver services,   Day of discharge Exam   Vitals:    03/31/25 1215   BP: 144/83   Pulse: 72   Resp: 17   Temp: 97.9 °F (36.6 °C)     Exam on day of discharge:  No sxs, no sob/cp/cough. BP fluctuates    Gen: No acute distress, alert and oriented  CV: RRR, +s1/s2  Lungs: CTAB, good respiratory effort  Abdomen: s/nt/nd  Ext: Moves all 4 extremities, no c/c/e  Neuro: CN Intact, no focal deficits  Discharge meds  START taking these medications      cefdinir 300 MG Caps  Commonly known as: Omnicef  Take 1 capsule (300 mg total) by mouth 2 (two) times daily for 3 days.            CONTINUE taking these medications      albuterol (5 MG/ML) 0.5% Nebu  Commonly known as: Ventolin     Budesonide-Formoterol Fumarate 160-4.5 MCG/ACT Aero  Commonly known as: SYMBICORT     buPROPion 75 MG Tabs  Commonly known as: Wellbutrin     celecoxib 200 MG Caps  Commonly known as: CeleBREX     cholecalciferol 50 MCG (2000 UT) Caps  Commonly known as: Vitamin D3     clobetasol 0.05 % Crea  Commonly known as:  Temovate     escitalopram 10 MG Tabs  Commonly known as: Lexapro     famotidine 20 MG Tabs  Commonly known as: Pepcid     fexofenadine 180 MG Tabs  Commonly known as: Allegra     finasteride 5 MG Tabs  Commonly known as: Proscar  Take 1 tablet (5 mg total) by mouth daily.     gabapentin 400 MG Caps  Commonly known as: Neurontin     hydrocortisone 2.5 % Crea     Mirabegron ER 50 MG Tb24  Commonly known as: Myrbetriq  Take 1 tablet (50 mg total) by mouth daily.     montelukast 10 MG Tabs  Commonly known as: Singulair     MULTIPLE VITAMIN OR     polyethylene glycol (PEG 3350) 17 g Pack  Commonly known as: Miralax     Solifenacin Succinate 10 MG Tabs  Commonly known as: VESICARE  Take 1 tablet (10 mg total) by mouth daily.     Tadalafil 20 MG Tabs  Commonly known as: Cialis  Take 1 tablet (20 mg total) by mouth daily as needed for Erectile Dysfunction.     tamsulosin 0.4 MG Caps  Commonly known as: Flomax     triamcinolone 55 MCG/ACT Aero  Commonly known as: Nasacort            STOP taking these medications      azithromycin 250 MG Tabs  Commonly known as: Zithromax         Consults: IP CONSULT TO HOSPITALIST  IP CONSULT TO PULMONOLOGY  IP CONSULT TO SOCIAL WORK  IP CONSULT TO SOCIAL WORK  Radiology: XR CHEST AP PORTABLE  (CPT=71045)  Result Date: 3/28/2025  CONCLUSION:  There are dependent densities in lower lungs bilaterally.  This is increased on the right and could represent worsening atelectasis with superimposed pneumonia not excluded.     CT BRAIN OR HEAD (CPT=70450)  Result Date: 3/28/2025  CONCLUSION:  Stable chronic changes.  No acute disease.           Code Status: No Order    Darrick Treviño MD  3/31/2025  2:24 PM      REVIEWER COMMENTS    FOR FINAL REVIEW/APPROVAL OF ALL INPT DAYS

## 2025-04-01 NOTE — HOME CARE LIAISON
[facility]. Spoke w/ patients spouse who is agreeable with [facility]. Contact information placed on AVS.

## 2025-04-01 NOTE — CM/SW NOTE
04/01/25 1416   Choice of Post-Acute Provider   Informed patient of right to choose their preferred provider Yes   List of appropriate post-acute services provided to patient/family with quality data Yes   Patient/family choice [facility]   Information given to Spouse/Significant other     Spoke with spouse (Jennifer) they were agreeable with H. Reserved on aidin and updated liaison.    SW/CM to remain available for support and/or discharge planning.    Augusta SANCHEZ LCSW  Discharge Planner

## 2025-05-02 ENCOUNTER — HOSPITAL ENCOUNTER (INPATIENT)
Facility: HOSPITAL | Age: 80
LOS: 3 days | Discharge: HOME | End: 2025-05-05
Attending: EMERGENCY MEDICINE | Admitting: INTERNAL MEDICINE
Payer: MEDICARE

## 2025-05-02 ENCOUNTER — APPOINTMENT (OUTPATIENT)
Dept: GENERAL RADIOLOGY | Facility: HOSPITAL | Age: 80
End: 2025-05-02
Attending: EMERGENCY MEDICINE
Payer: MEDICARE

## 2025-05-02 DIAGNOSIS — J44.1 COPD EXACERBATION (HCC): Primary | ICD-10-CM

## 2025-05-02 DIAGNOSIS — A41.9 SEPSIS SECONDARY TO UTI (HCC): ICD-10-CM

## 2025-05-02 DIAGNOSIS — N39.0 SEPSIS SECONDARY TO UTI (HCC): ICD-10-CM

## 2025-05-02 PROBLEM — R73.9 HYPERGLYCEMIA: Status: ACTIVE | Noted: 2025-05-02

## 2025-05-02 LAB
ALBUMIN SERPL-MCNC: 4.4 G/DL (ref 3.2–4.8)
ALBUMIN/GLOB SERPL: 1.7 {RATIO} (ref 1–2)
ALP LIVER SERPL-CCNC: 94 U/L (ref 45–117)
ALT SERPL-CCNC: 13 U/L (ref 10–49)
ANION GAP SERPL CALC-SCNC: 9 MMOL/L (ref 0–18)
APTT PPP: 27.3 SECONDS (ref 23–36)
AST SERPL-CCNC: 32 U/L (ref ?–34)
BASOPHILS # BLD AUTO: 0.02 X10(3) UL (ref 0–0.2)
BASOPHILS NFR BLD AUTO: 0.2 %
BILIRUB SERPL-MCNC: 1.7 MG/DL (ref 0.2–1.1)
BILIRUB UR QL CFM: NEGATIVE
BUN BLD-MCNC: 17 MG/DL (ref 9–23)
CALCIUM BLD-MCNC: 9.4 MG/DL (ref 8.7–10.6)
CHLORIDE SERPL-SCNC: 102 MMOL/L (ref 98–112)
CO2 SERPL-SCNC: 26 MMOL/L (ref 21–32)
CREAT BLD-MCNC: 0.97 MG/DL (ref 0.7–1.3)
D DIMER PPP FEU-MCNC: 0.76 UG/ML FEU (ref ?–0.82)
EGFRCR SERPLBLD CKD-EPI 2021: 78 ML/MIN/1.73M2 (ref 60–?)
EOSINOPHIL # BLD AUTO: 0.06 X10(3) UL (ref 0–0.7)
EOSINOPHIL NFR BLD AUTO: 0.5 %
ERYTHROCYTE [DISTWIDTH] IN BLOOD BY AUTOMATED COUNT: 13.1 %
FLUAV + FLUBV RNA SPEC NAA+PROBE: NEGATIVE
FLUAV + FLUBV RNA SPEC NAA+PROBE: NEGATIVE
GLOBULIN PLAS-MCNC: 2.6 G/DL (ref 2–3.5)
GLUCOSE BLD-MCNC: 130 MG/DL (ref 70–99)
GLUCOSE UR STRIP.AUTO-MCNC: NORMAL MG/DL
HCT VFR BLD AUTO: 39.5 % (ref 39–53)
HGB BLD-MCNC: 13.7 G/DL (ref 13–17.5)
IMM GRANULOCYTES # BLD AUTO: 0.07 X10(3) UL (ref 0–1)
IMM GRANULOCYTES NFR BLD: 0.6 %
INR BLD: 1.11 (ref 0.8–1.2)
KETONES UR STRIP.AUTO-MCNC: 10 MG/DL
LACTATE SERPL-SCNC: 1.1 MMOL/L (ref 0.5–2)
LEUKOCYTE ESTERASE UR QL STRIP.AUTO: 500
LYMPHOCYTES # BLD AUTO: 0.36 X10(3) UL (ref 1–4)
LYMPHOCYTES NFR BLD AUTO: 3.2 %
MCH RBC QN AUTO: 35.4 PG (ref 26–34)
MCHC RBC AUTO-ENTMCNC: 34.7 G/DL (ref 31–37)
MCV RBC AUTO: 102.1 FL (ref 80–100)
MONOCYTES # BLD AUTO: 0.6 X10(3) UL (ref 0.1–1)
MONOCYTES NFR BLD AUTO: 5.3 %
NEUTROPHILS # BLD AUTO: 10.19 X10 (3) UL (ref 1.5–7.7)
NEUTROPHILS # BLD AUTO: 10.19 X10(3) UL (ref 1.5–7.7)
NEUTROPHILS NFR BLD AUTO: 90.2 %
NT-PROBNP SERPL-MCNC: 693 PG/ML (ref ?–450)
OSMOLALITY SERPL CALC.SUM OF ELEC: 287 MOSM/KG (ref 275–295)
PH UR STRIP.AUTO: 7 [PH] (ref 5–8)
PLATELET # BLD AUTO: 147 10(3)UL (ref 150–450)
POTASSIUM SERPL-SCNC: 4.3 MMOL/L (ref 3.5–5.1)
PROT SERPL-MCNC: 7 G/DL (ref 5.7–8.2)
PROT UR STRIP.AUTO-MCNC: 20 MG/DL
PROTHROMBIN TIME: 14.5 SECONDS (ref 11.6–14.8)
RBC # BLD AUTO: 3.87 X10(6)UL (ref 3.8–5.8)
RBC UR QL AUTO: NEGATIVE
RSV RNA SPEC NAA+PROBE: NEGATIVE
SARS-COV-2 RNA RESP QL NAA+PROBE: NOT DETECTED
SODIUM SERPL-SCNC: 137 MMOL/L (ref 136–145)
SP GR UR STRIP.AUTO: 1.02 (ref 1–1.03)
TROPONIN I SERPL HS-MCNC: 5 NG/L (ref ?–53)
UROBILINOGEN UR STRIP.AUTO-MCNC: 6 MG/DL
WBC # BLD AUTO: 11.3 X10(3) UL (ref 4–11)
WBC #/AREA URNS AUTO: >50 /HPF

## 2025-05-02 PROCEDURE — 83605 ASSAY OF LACTIC ACID: CPT | Performed by: EMERGENCY MEDICINE

## 2025-05-02 PROCEDURE — 85730 THROMBOPLASTIN TIME PARTIAL: CPT

## 2025-05-02 PROCEDURE — 36415 COLL VENOUS BLD VENIPUNCTURE: CPT

## 2025-05-02 PROCEDURE — 87040 BLOOD CULTURE FOR BACTERIA: CPT | Performed by: EMERGENCY MEDICINE

## 2025-05-02 PROCEDURE — 80053 COMPREHEN METABOLIC PANEL: CPT

## 2025-05-02 PROCEDURE — 87086 URINE CULTURE/COLONY COUNT: CPT | Performed by: EMERGENCY MEDICINE

## 2025-05-02 PROCEDURE — 96361 HYDRATE IV INFUSION ADD-ON: CPT

## 2025-05-02 PROCEDURE — 83880 ASSAY OF NATRIURETIC PEPTIDE: CPT

## 2025-05-02 PROCEDURE — 83880 ASSAY OF NATRIURETIC PEPTIDE: CPT | Performed by: EMERGENCY MEDICINE

## 2025-05-02 PROCEDURE — 85025 COMPLETE CBC W/AUTO DIFF WBC: CPT

## 2025-05-02 PROCEDURE — 84484 ASSAY OF TROPONIN QUANT: CPT | Performed by: EMERGENCY MEDICINE

## 2025-05-02 PROCEDURE — 85025 COMPLETE CBC W/AUTO DIFF WBC: CPT | Performed by: EMERGENCY MEDICINE

## 2025-05-02 PROCEDURE — 93010 ELECTROCARDIOGRAM REPORT: CPT

## 2025-05-02 PROCEDURE — 94644 CONT INHLJ TX 1ST HOUR: CPT

## 2025-05-02 PROCEDURE — 93005 ELECTROCARDIOGRAM TRACING: CPT

## 2025-05-02 PROCEDURE — 85610 PROTHROMBIN TIME: CPT

## 2025-05-02 PROCEDURE — 0241U SARS-COV-2/FLU A AND B/RSV BY PCR (GENEXPERT): CPT | Performed by: EMERGENCY MEDICINE

## 2025-05-02 PROCEDURE — 71045 X-RAY EXAM CHEST 1 VIEW: CPT | Performed by: EMERGENCY MEDICINE

## 2025-05-02 PROCEDURE — 96375 TX/PRO/DX INJ NEW DRUG ADDON: CPT

## 2025-05-02 PROCEDURE — 84484 ASSAY OF TROPONIN QUANT: CPT

## 2025-05-02 PROCEDURE — 85610 PROTHROMBIN TIME: CPT | Performed by: EMERGENCY MEDICINE

## 2025-05-02 PROCEDURE — 96365 THER/PROPH/DIAG IV INF INIT: CPT

## 2025-05-02 PROCEDURE — 99285 EMERGENCY DEPT VISIT HI MDM: CPT

## 2025-05-02 PROCEDURE — 81001 URINALYSIS AUTO W/SCOPE: CPT | Performed by: EMERGENCY MEDICINE

## 2025-05-02 PROCEDURE — 80053 COMPREHEN METABOLIC PANEL: CPT | Performed by: EMERGENCY MEDICINE

## 2025-05-02 PROCEDURE — 85379 FIBRIN DEGRADATION QUANT: CPT | Performed by: EMERGENCY MEDICINE

## 2025-05-02 PROCEDURE — 85730 THROMBOPLASTIN TIME PARTIAL: CPT | Performed by: EMERGENCY MEDICINE

## 2025-05-02 RX ORDER — SENNOSIDES 8.6 MG
17.2 TABLET ORAL NIGHTLY PRN
Status: DISCONTINUED | OUTPATIENT
Start: 2025-05-02 | End: 2025-05-05

## 2025-05-02 RX ORDER — SODIUM CHLORIDE 9 MG/ML
INJECTION, SOLUTION INTRAVENOUS ONCE
Status: COMPLETED | OUTPATIENT
Start: 2025-05-02 | End: 2025-05-02

## 2025-05-02 RX ORDER — BISACODYL 10 MG
10 SUPPOSITORY, RECTAL RECTAL
Status: DISCONTINUED | OUTPATIENT
Start: 2025-05-02 | End: 2025-05-05

## 2025-05-02 RX ORDER — BENZONATATE 200 MG/1
200 CAPSULE ORAL 3 TIMES DAILY PRN
Status: DISCONTINUED | OUTPATIENT
Start: 2025-05-02 | End: 2025-05-05

## 2025-05-02 RX ORDER — ACETAMINOPHEN 500 MG
500 TABLET ORAL EVERY 4 HOURS PRN
Status: DISCONTINUED | OUTPATIENT
Start: 2025-05-02 | End: 2025-05-05

## 2025-05-02 RX ORDER — METOCLOPRAMIDE HYDROCHLORIDE 5 MG/ML
5 INJECTION INTRAMUSCULAR; INTRAVENOUS EVERY 8 HOURS PRN
Status: DISCONTINUED | OUTPATIENT
Start: 2025-05-02 | End: 2025-05-05

## 2025-05-02 RX ORDER — ONDANSETRON 2 MG/ML
4 INJECTION INTRAMUSCULAR; INTRAVENOUS EVERY 6 HOURS PRN
Status: DISCONTINUED | OUTPATIENT
Start: 2025-05-02 | End: 2025-05-05

## 2025-05-02 RX ORDER — ALBUTEROL SULFATE 5 MG/ML
10 SOLUTION RESPIRATORY (INHALATION) ONCE
Status: COMPLETED | OUTPATIENT
Start: 2025-05-02 | End: 2025-05-02

## 2025-05-02 RX ORDER — POLYETHYLENE GLYCOL 3350 17 G/17G
17 POWDER, FOR SOLUTION ORAL DAILY PRN
Status: DISCONTINUED | OUTPATIENT
Start: 2025-05-02 | End: 2025-05-05

## 2025-05-02 RX ORDER — IPRATROPIUM BROMIDE AND ALBUTEROL SULFATE 2.5; .5 MG/3ML; MG/3ML
3 SOLUTION RESPIRATORY (INHALATION) EVERY 6 HOURS
Status: DISCONTINUED | OUTPATIENT
Start: 2025-05-02 | End: 2025-05-05

## 2025-05-02 RX ORDER — SODIUM CHLORIDE 9 MG/ML
INJECTION, SOLUTION INTRAVENOUS CONTINUOUS
Status: ACTIVE | OUTPATIENT
Start: 2025-05-02 | End: 2025-05-03

## 2025-05-02 RX ORDER — METHYLPREDNISOLONE SODIUM SUCCINATE 40 MG/ML
40 INJECTION INTRAMUSCULAR; INTRAVENOUS EVERY 8 HOURS
Status: DISCONTINUED | OUTPATIENT
Start: 2025-05-03 | End: 2025-05-05

## 2025-05-02 RX ORDER — NITROFURANTOIN 25; 75 MG/1; MG/1
100 CAPSULE ORAL 2 TIMES DAILY
COMMUNITY
End: 2025-05-05

## 2025-05-02 RX ORDER — HEPARIN SODIUM 5000 [USP'U]/ML
5000 INJECTION, SOLUTION INTRAVENOUS; SUBCUTANEOUS EVERY 8 HOURS SCHEDULED
Status: DISCONTINUED | OUTPATIENT
Start: 2025-05-02 | End: 2025-05-05

## 2025-05-02 RX ORDER — METHYLPREDNISOLONE SODIUM SUCCINATE 125 MG/2ML
125 INJECTION INTRAMUSCULAR; INTRAVENOUS ONCE
Status: COMPLETED | OUTPATIENT
Start: 2025-05-02 | End: 2025-05-02

## 2025-05-02 RX ORDER — SODIUM PHOSPHATE, DIBASIC AND SODIUM PHOSPHATE, MONOBASIC 7; 19 G/230ML; G/230ML
1 ENEMA RECTAL ONCE AS NEEDED
Status: DISCONTINUED | OUTPATIENT
Start: 2025-05-02 | End: 2025-05-05

## 2025-05-02 NOTE — ED INITIAL ASSESSMENT (HPI)
Pt to ER for hu. Pt has COPD but only wears home o2 at night. Pt states he checked is Sp02 today and was 83%. Pt 90% on 3L o2 NC in triage. Pt A&O x 4 in triage. Pt denies dizziness or cp

## 2025-05-03 LAB
ANION GAP SERPL CALC-SCNC: 10 MMOL/L (ref 0–18)
ATRIAL RATE: 99 BPM
BASOPHILS # BLD AUTO: 0.01 X10(3) UL (ref 0–0.2)
BASOPHILS NFR BLD AUTO: 0.1 %
BUN BLD-MCNC: 14 MG/DL (ref 9–23)
CALCIUM BLD-MCNC: 9.2 MG/DL (ref 8.7–10.6)
CHLORIDE SERPL-SCNC: 104 MMOL/L (ref 98–112)
CO2 SERPL-SCNC: 26 MMOL/L (ref 21–32)
CREAT BLD-MCNC: 0.95 MG/DL (ref 0.7–1.3)
EGFRCR SERPLBLD CKD-EPI 2021: 80 ML/MIN/1.73M2 (ref 60–?)
EOSINOPHIL # BLD AUTO: 0 X10(3) UL (ref 0–0.7)
EOSINOPHIL NFR BLD AUTO: 0 %
ERYTHROCYTE [DISTWIDTH] IN BLOOD BY AUTOMATED COUNT: 12.9 %
GLUCOSE BLD-MCNC: 189 MG/DL (ref 70–99)
HCT VFR BLD AUTO: 36.5 % (ref 39–53)
HGB BLD-MCNC: 12.8 G/DL (ref 13–17.5)
IMM GRANULOCYTES # BLD AUTO: 0.09 X10(3) UL (ref 0–1)
IMM GRANULOCYTES NFR BLD: 0.8 %
LYMPHOCYTES # BLD AUTO: 0.49 X10(3) UL (ref 1–4)
LYMPHOCYTES NFR BLD AUTO: 4.5 %
MAGNESIUM SERPL-MCNC: 1.9 MG/DL (ref 1.6–2.6)
MCH RBC QN AUTO: 36 PG (ref 26–34)
MCHC RBC AUTO-ENTMCNC: 35.1 G/DL (ref 31–37)
MCV RBC AUTO: 102.5 FL (ref 80–100)
MONOCYTES # BLD AUTO: 0.1 X10(3) UL (ref 0.1–1)
MONOCYTES NFR BLD AUTO: 0.9 %
NEUTROPHILS # BLD AUTO: 10.26 X10 (3) UL (ref 1.5–7.7)
NEUTROPHILS # BLD AUTO: 10.26 X10(3) UL (ref 1.5–7.7)
NEUTROPHILS NFR BLD AUTO: 93.7 %
OSMOLALITY SERPL CALC.SUM OF ELEC: 296 MOSM/KG (ref 275–295)
P AXIS: 99 DEGREES
P-R INTERVAL: 148 MS
PLATELET # BLD AUTO: 139 10(3)UL (ref 150–450)
POTASSIUM SERPL-SCNC: 4.1 MMOL/L (ref 3.5–5.1)
Q-T INTERVAL: 340 MS
QRS DURATION: 72 MS
QTC CALCULATION (BEZET): 436 MS
R AXIS: 53 DEGREES
RBC # BLD AUTO: 3.56 X10(6)UL (ref 3.8–5.8)
SODIUM SERPL-SCNC: 140 MMOL/L (ref 136–145)
T AXIS: 31 DEGREES
VENTRICULAR RATE: 99 BPM
WBC # BLD AUTO: 11 X10(3) UL (ref 4–11)

## 2025-05-03 PROCEDURE — 80048 BASIC METABOLIC PNL TOTAL CA: CPT | Performed by: INTERNAL MEDICINE

## 2025-05-03 PROCEDURE — 94799 UNLISTED PULMONARY SVC/PX: CPT

## 2025-05-03 PROCEDURE — 94760 N-INVAS EAR/PLS OXIMETRY 1: CPT

## 2025-05-03 PROCEDURE — 94640 AIRWAY INHALATION TREATMENT: CPT

## 2025-05-03 PROCEDURE — 97161 PT EVAL LOW COMPLEX 20 MIN: CPT

## 2025-05-03 PROCEDURE — 83735 ASSAY OF MAGNESIUM: CPT | Performed by: INTERNAL MEDICINE

## 2025-05-03 PROCEDURE — 97165 OT EVAL LOW COMPLEX 30 MIN: CPT

## 2025-05-03 PROCEDURE — 97116 GAIT TRAINING THERAPY: CPT

## 2025-05-03 PROCEDURE — 85025 COMPLETE CBC W/AUTO DIFF WBC: CPT | Performed by: INTERNAL MEDICINE

## 2025-05-03 PROCEDURE — 94660 CPAP INITIATION&MGMT: CPT

## 2025-05-03 PROCEDURE — 97535 SELF CARE MNGMENT TRAINING: CPT

## 2025-05-03 RX ORDER — ESCITALOPRAM OXALATE 10 MG/1
10 TABLET ORAL DAILY
Status: DISCONTINUED | OUTPATIENT
Start: 2025-05-03 | End: 2025-05-03

## 2025-05-03 RX ORDER — OXYBUTYNIN CHLORIDE 5 MG/1
10 TABLET, EXTENDED RELEASE ORAL DAILY
Status: DISCONTINUED | OUTPATIENT
Start: 2025-05-03 | End: 2025-05-05

## 2025-05-03 RX ORDER — TAMSULOSIN HYDROCHLORIDE 0.4 MG/1
0.4 CAPSULE ORAL DAILY
Status: DISCONTINUED | OUTPATIENT
Start: 2025-05-03 | End: 2025-05-03

## 2025-05-03 RX ORDER — MIRABEGRON 50 MG/1
50 TABLET, FILM COATED, EXTENDED RELEASE ORAL DAILY
Status: DISCONTINUED | OUTPATIENT
Start: 2025-05-03 | End: 2025-05-05

## 2025-05-03 RX ORDER — FAMOTIDINE 20 MG/1
20 TABLET, FILM COATED ORAL DAILY
Status: DISCONTINUED | OUTPATIENT
Start: 2025-05-03 | End: 2025-05-05

## 2025-05-03 RX ORDER — GABAPENTIN 400 MG/1
800 CAPSULE ORAL 2 TIMES DAILY
Status: DISCONTINUED | OUTPATIENT
Start: 2025-05-03 | End: 2025-05-05

## 2025-05-03 RX ORDER — FAMOTIDINE 20 MG/1
20 TABLET, FILM COATED ORAL 2 TIMES DAILY
Status: DISCONTINUED | OUTPATIENT
Start: 2025-05-03 | End: 2025-05-03

## 2025-05-03 RX ORDER — CETIRIZINE HYDROCHLORIDE 10 MG/1
10 TABLET ORAL DAILY
Status: DISCONTINUED | OUTPATIENT
Start: 2025-05-03 | End: 2025-05-05

## 2025-05-03 RX ORDER — BUPROPION HYDROCHLORIDE 75 MG/1
75 TABLET ORAL DAILY
Status: DISCONTINUED | OUTPATIENT
Start: 2025-05-03 | End: 2025-05-05

## 2025-05-03 RX ORDER — MONTELUKAST SODIUM 10 MG/1
10 TABLET ORAL EVERY MORNING
Status: DISCONTINUED | OUTPATIENT
Start: 2025-05-03 | End: 2025-05-05

## 2025-05-03 RX ORDER — FLUTICASONE PROPIONATE AND SALMETEROL 250; 50 UG/1; UG/1
1 POWDER RESPIRATORY (INHALATION) 2 TIMES DAILY
Status: DISCONTINUED | OUTPATIENT
Start: 2025-05-03 | End: 2025-05-05

## 2025-05-03 RX ORDER — FINASTERIDE 5 MG/1
5 TABLET, FILM COATED ORAL DAILY
Status: DISCONTINUED | OUTPATIENT
Start: 2025-05-03 | End: 2025-05-05

## 2025-05-03 NOTE — OCCUPATIONAL THERAPY NOTE
OCCUPATIONAL THERAPY EVALUATION - INPATIENT    Room Number: 501/501-A  Evaluation Date: 5/3/2025     Type of Evaluation: Initial  Presenting Problem: UTI, sepsis, COPD exacerbation    Physician Order: IP Consult to Occupational Therapy  Reason for Therapy:  ADL/IADL Dysfunction and Discharge Planning      OCCUPATIONAL THERAPY ASSESSMENT   Patient met all OT goals at supervision-mod (I) level.    Patient reports no further questions/concerns at this time.   Discharge OT in hospital.          History: Patient is a 80 year old male admitted on 5/2/2025 with Presenting Problem: UTI, sepsis, COPD exacerbation. Co-Morbidities : COPD, hospitalization March 2025 for PNA, falls    WEIGHT BEARING RESTRICTION                   Recommendations for nursing staff:   Transfers: supervision with RW  Toileting location: Toilet    EVALUATION SESSION:  ACTIVITY TOLERANCE   SBP stable supine and EOB. HR 110s with activity. O2 96% on RA at rest, 92% ambulating on RA. C/o SOB more than norm with ambulation.     COGNITION  Overall Cognitive Status:  WFL - within functional limits    UPPER EXTREMITY:   ROM: within functional limits   Strength: is within functional limits except for the following;  L shoulder 4/5    EDUCATION PROVIDED  Patient Education : Role of Occupational Therapy; Plan of Care; Discharge Recommendations; Functional Transfer Techniques  Patient's Response to Education: Verbalized Understanding    PATIENT START OF SESSION: semi supine  FUNCTIONAL TRANSFER ASSESSMENT  Sit to Stand: Edge of Bed  Edge of Bed: Supervision    BED MOBILITY  Supine to Sit : Supervision    BALANCE ASSESSMENT     FUNCTIONAL ADL ASSESSMENT  Eating: Independent  LB Dressing Seated: Supervision (slip on shoes, min cueing to ensure R heel is all the way in)      EQUIPMENT USED: RW  Demonstrates functional use    THERAPIST COMMENTS: ADL/mobility as noted. Ambulates in harding Supervision with RW. C/o SOB. Educated on energy conservation, breathing  techniques. Chair transfer supervision.     Patient End of Session: Up in chair, Needs met, Call light within reach, All patient questions and concerns addressed, RN aware of session/findings, Hospital anti-slip socks    OCCUPATIONAL PROFILE    HOME SITUATION  Type of Home: Apartment  Home Layout: One level  Lives With: Spouse    Toilet and Equipment: Standard height toilet, Grab bar  Shower/Tub and Equipment: Walk-in shower, Grab bar (wife's shower has a chair, but pt doesn't use one)             Drives: No  Patient Regularly Uses: Glasses, Home O2 (2 L/min at night)    Prior Level of Function: Pt reports independence with ADL and ambulation with rollator prior to admit. Pt states prior to PNA (March 2025) he had no falls. He has had several since then.       SUBJECTIVE  Pt states he was in OP PT working on his L shoulder, overall strength, and balance, but he just finished.     PAIN ASSESSMENT  Rating: Unable to rate  Location: L shoulder with ROM  Management Techniques: Relaxation    OBJECTIVE  Precautions: Bed/chair alarm  Fall Risk: High fall risk    WEIGHT BEARING RESTRICTION                   AM-PAC ‘6-Clicks’ Inpatient Daily Activity Short Form  -   Putting on and taking off regular lower body clothing?: A Little  -   Bathing (including washing, rinsing, drying)?: A Little  -   Toileting, which includes using toilet, bedpan or urinal? : A Little  -   Putting on and taking off regular upper body clothing?: None  -   Taking care of personal grooming such as brushing teeth?: None  -   Eating meals?: None    AM-PAC Score:  Score: 21  Approx Degree of Impairment: 32.79%  Standardized Score (AM-PAC Scale): 44.27      ADDITIONAL TESTS     NEUROLOGICAL FINDINGS        PLAN   Patient has been evaluated and presents with no skilled Occupational Therapy needs at this time.  Patient discharged from Occupational Therapy services.  Please re-order if a new functional limitation presents during this admission.      Patient  Evaluation Complexity Level:   Occupational Profile/Medical History LOW   Specific performance deficits impacting engagement in ADL/IADL LOW   Client Assessment/Performance Deficits LOW   Clinical Decision Making LOW   Overall Complexity LOW     OT Session Time: 25 minutes  Self-Care Home Management: 15 minutes  Therapeutic Activity:  minutes  Neuromuscular Re-education:  minutes  Therapeutic Exercise:  minutes

## 2025-05-03 NOTE — ED PROVIDER NOTES
Patient Seen in: [facility] Emergency Department      History     Chief Complaint   Patient presents with    Fatigue    Difficulty Breathing     Stated Complaint: hu, uti fatigue    Subjective:   HPI    Patient is a pleasant 80-year-old male with a history of COPD, past smoking, interstitial lung disease who wears oxygen or mentally at home who presents with multiple complaints.  Wife helps with his history.  He was in the hospital in March for pneumonia.  Wife says he has not completely recovered from that.  He is increasing weakness and some shortness of breath this week.  Today he slid to the floor and was unable to get off the ground secondary to weakness.  Had been seen at the immediate care for urinary symptoms and diagnosed with a UTI yesterday.  Increasing cough.  Satting 83% in triage and 90% on 3 L.  Denies chest pain.  No other specific complaints.  Patient is otherwise at his baseline.  Wife is at bedside and supportive.  Patient's echocardiogram from 5 2/24 reviewed.  Normal ejection fraction.  65 to 70%.  History of Present Illness               Objective:     Past Medical History:    Arthritis    Asthma (HCC)    Back problem    Cataract    COPD (chronic obstructive pulmonary disease) ([facility])    Degenerative disc disease, cervical    Eczema    Esophageal reflux    Essential hypertension    Hearing impairment    bilateral hearing aids    High blood pressure    Interstitial lung disease (HCC)    Muscle weakness    Osteoarthritis    Osteopenia    Problems with swallowing    Pulmonary hypertension (HCC)    Scoliosis    Sleep apnea    cpap    Thoracic kyphosis    Visual impairment              Past Surgical History:   Procedure Laterality Date    Appendectomy      Hernia surgery      Hip replacement surgery Left     Repair ing hernia,5+y/o,reducibl      Spine surgery procedure unlisted  2019    lumbar fusion    Spine surgery procedure unlisted      LUMBAR SURGERY X 2                Social History      Socioeconomic History    Marital status:    Tobacco Use    Smoking status: Former     Types: Cigarettes    Smokeless tobacco: Never   Vaping Use    Vaping status: Never Used   Substance and Sexual Activity    Alcohol use: Yes     Alcohol/week: 14.0 standard drinks of alcohol     Types: 14 Standard drinks or equivalent per week    Drug use: Never     Social Drivers of Health     Food Insecurity: No Food Insecurity (3/29/2025)    NCSS - Food Insecurity     Worried About Running Out of Food in the Last Year: No     Ran Out of Food in the Last Year: No   Transportation Needs: No Transportation Needs (3/29/2025)    NCSS - Transportation     Lack of Transportation: No   Housing Stability: Not At Risk (3/29/2025)    NCSS - Housing/Utilities     Has Housing: Yes     Worried About Losing Housing: No     Unable to Get Utilities: No                                Physical Exam     ED Triage Vitals [05/02/25 1837]   /71   Pulse 102   Resp 18   Temp 99.6 °F (37.6 °C)   Temp src Temporal   SpO2 91 %   O2 Device Nasal cannula       Current Vitals:   Vital Signs  BP: 117/71  Pulse: 52  Resp: 26  Temp: 99.6 °F (37.6 °C)  Temp src: Temporal  MAP (mmHg): 86    Oxygen Therapy  SpO2: 99 %  O2 Device: Nasal cannula  O2 Flow Rate (L/min): 3 L/min        Physical Exam  General: Somewhat fatigued appearing 89-year-old male  HEENT: Normal cephalic atraumatic.  Nonicteric sclera.  Moist mucous membranes.  No meningismus.  No adenopathy  Lungs: Prolonged expiratory phase.  No focal rhonchi.  Some expiratory wheezing.  Cardiac: No tachycardia.  No murmurs.  Regular rate and rhythm.  Abdomen: Soft and nontender throughout.  No rebound or guarding  Extremities: Trace edema.  Skin: No rashes, no pallor  Neuro: Awake oriented ×3.  Nonfocal.  Good strength throughout  Physical Exam                ED Course     Labs Reviewed   COMP METABOLIC PANEL (14) - Abnormal; Notable for the following components:       Result Value    Glucose  130 (*)     Bilirubin, Total 1.7 (*)     All other components within normal limits   CBC WITH DIFFERENTIAL WITH PLATELET - Abnormal; Notable for the following components:    WBC 11.3 (*)     .0 (*)     .1 (*)     MCH 35.4 (*)     Neutrophil Absolute Prelim 10.19 (*)     Neutrophil Absolute 10.19 (*)     Lymphocyte Absolute 0.36 (*)     All other components within normal limits   PRO BETA NATRIURETIC PEPTIDE - Abnormal; Notable for the following components:    Pro-Beta Natriuretic Peptide 693 (*)     All other components within normal limits   TROPONIN I HIGH SENSITIVITY - Normal   PROTHROMBIN TIME (PT) - Normal   PTT, ACTIVATED - Normal   LACTIC ACID, PLASMA - Normal   D-DIMER - Normal   SARS-COV-2/FLU A AND B/RSV BY PCR (GENEXPERT) - Normal    Narrative:     This test is intended for the qualitative detection and differentiation of SARS-CoV-2, influenza A, influenza B, and respiratory syncytial virus (RSV) viral RNA in nasopharyngeal or nares swabs from individuals suspected of respiratory viral infection consistent with COVID-19 by their healthcare provider. Signs and symptoms of respiratory viral infection due to SARS-CoV-2, influenza, and RSV can be similar.    Test performed using the Xpert Xpress SARS-CoV-2/FLU/RSV (real time RT-PCR)  assay on the [employer] instrument, [employer], [place] [address].   This test is being used under the Food and Drug Administration's Emergency Use Authorization.    The authorized Fact Sheet for Healthcare Providers for this assay is available upon request from the laboratory.   URINALYSIS WITH CULTURE REFLEX   BLOOD CULTURE   BLOOD CULTURE     EKG    Rate, intervals and axes as noted on EKG Report.  Rate: 99  Rhythm: Sinus Rhythm  Reading: Normal sinus rhythm with ventricular rate of 99 nonspecific ST-T wave changes.  Axis/intervals are noted.  Otherwise, agree with EKG report              Results            Chest x-ray: Improved aeration of lung base.  Otherwise  negative.  Report reviewed     Lactic 1.1.  D-dimer negative.  Electrolytes noted.  .  Troponin 5.  White count 11.3 with platelet count of 147.  Somewhat of a left shift.   Troponin of 5.  BNP mildly elevated in the 600s.  White count 11.3.  Sodium 137.  Creatinine 0.97.  Bilirubin 1.7.       [facility]      Patient presents with generalized weakness, cough, hypoxia.  Differential includes pneumonia, PE, COPD exacerbation, urosepsis, other.  Is on Macrobid for a UTI.  Will send blood and urine cultures.  Will treat with an hour-long neb along with IV steroids for COPD exacerbation.  Will send D-dimer and consider CT if positive.  Will check chest x-ray to evaluate for pneumonia.  Does have a low-grade temp..  Will send COVID, flu.  Will reassess after blood work and imaging    Admission disposition: 5/2/2025  9:34 PM       Patient with urinary tract infection symptoms now weakness.  Cannot get off the ground.  History of COPD with hypoxia here.  Patient's chest x-ray without new pneumonia.  D-dimer negative making thromboembolic disease unlikely.  Was treated with an hour-long neb and Solu-Medrol here for COPD exasperation.  Maintained on oxygen.  Given his weakness inability get off the ground suspect sepsis from his UTI.  Urine reviewed from yesterday consistent with infection.  Will send for culture today along with blood cultures.  Will start Rocephin.  Will admit for further breathing treatments, care and treatment.    Medical Decision Making      Disposition and Plan     Clinical Impression:  1. COPD exacerbation (HCC)    2. Sepsis secondary to UTI (HCC)         Disposition:  Admit  5/2/2025  9:34 pm    Follow-up:  No follow-up provider specified.        Medications Prescribed:  Current Discharge Medication List          Supplementary Documentation:         Hospital Problems       Present on Admission  Date Reviewed: 3/21/2025          ICD-10-CM Noted POA    * (Principal) COPD exacerbation (HCC) J44.1  5/2/2025 Unknown    Hyperglycemia R73.9 5/2/2025 Yes                             Sepsis dx documented at 5/2/2025  9:14 PM

## 2025-05-03 NOTE — PLAN OF CARE
Pt is a&ox3-4, forgetful at times. RA. MAL/CPAP on nocs. Nebs. NSR, ST on tele. Med rec updated and completed with pt and son- MD notified. IV Solumedrol. IV abx. Heparin. Voids per BRP, up SBA w/ walker. Tolerating a regular diet. Pt and son updated on poc. No further needs at this time.    Problem: Patient/Family Goals  Goal: Patient/Family Long Term Goal  Description: Patient's Long Term Goal: Discharge with proper resources   Interventions:- Follow POC- See additional Care Plan goals for specific interventions  Outcome: Progressing  Goal: Patient/Family Short Term Goal  Description: Patient's Short Term Goal: 5/2 NOC: Get rest   Interventions: - Cluster care- See additional Care Plan goals for specific interventions  Outcome: Progressing

## 2025-05-03 NOTE — H&P
[facility]   part of [facility]    History and Physical     Duke Garcia III Patient Status:  Inpatient    1945 MRN [id number]   Location [facility] 5NW-A Attending Justin Berg MD   Hosp Day # 1 PCP Roberto Henderson MD     Chief Complaint: Weakness    History of Present Illness: Duke Garcia III is a     Patient says that yesterday he was at home when he said his legs just gave out on him due to weakness.  Denied any focal symptoms.  Denied any dizziness or chest pain or palpitations prior to the event.  Denied any bowel or urinary incontinence.  Patient also says he has been having shortness of breath.  He appears that this is somewhat chronic in nature.  Patient denies any significant productive cough, fevers or chills.  Patient denies any increase in lower extremity edema.  Patient denies any change in medications.  Patient was recently admitted and treated for pneumonia earlier this year.  Patient did recently get seen in the immediate care and was diagnosed with UTI and started antibiotics.  Patient denies any other significant positive review of systems at this time.    Past Medical History:Past Medical History[1]     Past Surgical History: Past Surgical History[2]    Social History:  reports that he quit smoking about 30 years ago. His smoking use included cigarettes. He has never used smokeless tobacco. He reports current alcohol use of about 14.0 standard drinks of alcohol per week. He reports that he does not use drugs.no illegal drugs, , 4 children, walker use    Family History: Family History[3]  Mother passed  Father passed    Allergies: Allergies[4]    Medications:  Medications Ordered Prior to Encounter[5]    Review of Systems:   A comprehensive 14 point review of systems was completed.    Pertinent positives and negatives noted in the HPI.    Physical Exam:    /76 (BP Location: Right arm)   Pulse 102   Temp 98.4 °F (36.9 °C) (Oral)   Resp 19    Wt 190 lb (86.2 kg)   SpO2 95%   BMI 30.67 kg/m²   General: No acute distress. Alert and oriented  HEENT: Normocephalic atraumatic. Moist mucous membranes. EOM-I  Neck: No JVD. No carotid bruits.  Respiratory: decreased BS left base, No wheezes. No crackles  Cardiovascular: S1, S2. Regular rate and rhythm. No murmurs  Chest and Back: No tenderness or deformity.  Abdomen: Soft, nontender, nondistended.  Positive bowel sounds. No rebound, guarding  Neurologic: No focal neurological deficits. CNII-XII grossly intact. Sensation and strength intact  Musculoskeletal: Moves all extremities.  Extremities: No edema or tenderness of the LE  Integument: No new rashes or lesions.   Psychiatric: Appropriate mood and affect.      Diagnostic Data:      Labs:  Recent Labs   Lab 05/02/25 1918 05/03/25  0614   WBC 11.3* 11.0   HGB 13.7 12.8*   .1* 102.5*   .0* 139.0*   INR 1.11  --        Recent Labs   Lab 05/02/25 1918 05/03/25  0614   * 189*   BUN 17 14   CREATSERUM 0.97 0.95   CA 9.4 9.2   ALB 4.4  --     140   K 4.3 4.1    104   CO2 26.0 26.0   ALKPHO 94  --    AST 32  --    ALT 13  --    BILT 1.7*  --    TP 7.0  --        Estimated Creatinine Clearance: 54.1 mL/min (based on SCr of 0.95 mg/dL).    Recent Labs   Lab 05/02/25 1918   PTP 14.5   INR 1.11       No results for input(s): \"TROP\", \"CK\" in the last 168 hours.    Imaging: Imaging data reviewed in Epic.      ASSESSMENT / PLAN:   80 year old male with history of asthma, COPD, GERD, hypertension, interstitial lung disease, osteoarthritis, osteopenia, pulmonary hypertension, sleep apnea using CPAP presenting with generalized weakness and shortness of breath.      COPD exacerbation   -iv steroids  -neb  -consult pulm    UTI  -iv abx  -cx pending    Weakness  -secondary to to above  -PT/OT    Asthma  COPD  Pulm HTN  -incruse  -Advair    Anxiety  Depression  -bupropion    GERD  -famotidine     BPH  -finasteride    MAL  -cpap    Quality:  DVT  Prophylaxis: scd, heparin sq  CODE status:   Thakur: none    Plan of care discussed with patient and staff    Dispo: no discharge    Justin Berg MD  [facility] Hospitalist  [phone]               [1]   Past Medical History:   Arthritis    Asthma (HCC)    Back problem    Cataract    COPD (chronic obstructive pulmonary disease) (HCC)    Degenerative disc disease, cervical    Eczema    Esophageal reflux    Essential hypertension    Hearing impairment    bilateral hearing aids    High blood pressure    Interstitial lung disease (HCC)    Muscle weakness    Osteoarthritis    Osteopenia    Problems with swallowing    Pulmonary hypertension (HCC)    Scoliosis    Sleep apnea    cpap    Thoracic kyphosis    Visual impairment   [2]   Past Surgical History:  Procedure Laterality Date    Appendectomy      Hernia surgery      Hip replacement surgery Left     Repair ing hernia,5+y/o,reducibl      Spine surgery procedure unlisted  2019    lumbar fusion    Spine surgery procedure unlisted      LUMBAR SURGERY X 2   [3] History reviewed. No pertinent family history.  [4]   Allergies  Allergen Reactions    Lisinopril ANGIOEDEMA and UNKNOWN    Tramadol ANGIOEDEMA and UNKNOWN    Codeine UNKNOWN   [5]   Current Facility-Administered Medications on File Prior to Encounter   Medication Dose Route Frequency Provider Last Rate Last Admin    [COMPLETED] azithromycin (Zithromax) 500 mg in sodium chloride 0.9% 250mL IVPB premix  500 mg Intravenous Once Nabil Miles  mL/hr at 03/28/25 2307 500 mg at 03/28/25 2307    [COMPLETED] cefTRIAXone (Rocephin) 2 g in sodium chloride 0.9% 100 mL IVPB-ADDV  2 g Intravenous Once Nabil Miles MD   Stopped at 03/28/25 2300    [COMPLETED] azithromycin (Zithromax) tab 500 mg  500 mg Oral Daily Anand Helms IV, MD   500 mg at 03/30/25 2223     Current Outpatient Medications on File Prior to Encounter   Medication Sig Dispense Refill    nitrofurantoin monohydrate macro 100 MG Oral Cap Take 1  capsule (100 mg total) by mouth 2 (two) times daily.      tamsulosin 0.4 MG Oral Cap Take 1 capsule (0.4 mg total) by mouth in the morning.      finasteride 5 MG Oral Tab Take 1 tablet (5 mg total) by mouth daily. 90 tablet 6    Mirabegron ER (MYRBETRIQ) 50 MG Oral Tablet 24 Hr Take 1 tablet (50 mg total) by mouth daily. 90 each 5    Solifenacin Succinate 10 MG Oral Tab Take 1 tablet (10 mg total) by mouth daily. 90 tablet 5    buPROPion 75 MG Oral Tab Take 1 tablet (75 mg total) by mouth in the morning.      famotidine 20 MG Oral Tab Take 1 tablet (20 mg total) by mouth in the morning and 1 tablet (20 mg total) before bedtime.      gabapentin 400 MG Oral Cap Take 2 capsules (800 mg total) by mouth 2 (two) times daily.      Tadalafil (CIALIS) 20 MG Oral Tab Take 1 tablet (20 mg total) by mouth daily as needed for Erectile Dysfunction. 30 tablet 5    montelukast 10 MG Oral Tab Take 1 tablet (10 mg total) by mouth nightly. (Patient taking differently: Take 1 tablet (10 mg total) by mouth every morning.)      celecoxib 200 MG Oral Cap Take 1 capsule (200 mg total) by mouth in the morning and 1 capsule (200 mg total) before bedtime.      escitalopram 10 MG Oral Tab Take 1 tablet (10 mg total) by mouth in the morning.      polyethylene glycol, PEG 3350, 17 g Oral Powd Pack Take 17 g by mouth daily as needed.      cholecalciferol 50 MCG (2000 UT) Oral Cap Take 500 Units by mouth in the morning.      albuterol (2.5 MG/3ML) 0.083% Inhalation Nebu Soln Take 3 mL (2.5 mg total) by nebulization every 6 (six) hours as needed for Wheezing.      MULTIPLE VITAMIN OR Take 1 tablet by mouth in the morning.      fexofenadine 180 MG Oral Tab Take 1 tablet (180 mg total) by mouth daily as needed.

## 2025-05-03 NOTE — PLAN OF CARE
NURSING ADMISSION NOTE      Patient admitted via Cart  Oriented to room.  Safety precautions initiated.  Bed in low position.  Call light in reach    Pt from home with spouse  Aox4  VSS on 2L O2  afebrile  NSR/ST on tele, receiving Heparin  Electrolyte Non-cardiac replacement  Continent  Up with SBA and walker, bed alarm on and call light within reach  Regular diet  Receiving IV rocephin  Paged admitting MD about med rec, awaiting orders  Pt/family updated on current POC, no questions at this time      Problem: Patient/Family Goals  Goal: Patient/Family Long Term Goal  Description: Patient's Long Term Goal: Discharge with proper resources   Interventions:- Follow POC- See additional Care Plan goals for specific interventions  Outcome: Progressing  Goal: Patient/Family Short Term Goal  Description: Patient's Short Term Goal: 5/2 NOC: Get rest   Interventions: - Cluster care- See additional Care Plan goals for specific interventions  Outcome: Progressing     Problem: RESPIRATORY - ADULT  Goal: Achieves optimal ventilation and oxygenation  Description: INTERVENTIONS:- Assess for changes in respiratory status- Assess for changes in mentation and behavior- Position to facilitate oxygenation and minimize respiratory effort- Oxygen supplementation based on oxygen saturation or ABGs- Provide Smoking Cessation handout, if applicable- Encourage broncho-pulmonary hygiene including cough, deep breathe, Incentive Spirometry- Assess the need for suctioning and perform as needed- Assess and instruct to report SOB or any respiratory difficulty- Respiratory Therapy support as indicated- Manage/alleviate anxiety- Monitor for signs/symptoms of CO2 retention  Outcome: Progressing

## 2025-05-03 NOTE — ED QUICK NOTES
Orders for admission, patient is aware of plan and ready to go upstairs. Any questions, please call ED RN nithin at extension apod.     Patient Covid vaccination status: Fully vaccinated     COVID Test Ordered in ED: SARS-CoV-2/Flu A and B/RSV by PCR (GeneXpert)    COVID Suspicion at Admission: N/A    Running Infusions: see mar     Mental Status/LOC at time of transport:a/ox3    Other pertinent information:   CIWA score: N/A   NIH score:  N/A

## 2025-05-03 NOTE — PHYSICAL THERAPY NOTE
PHYSICAL THERAPY EVALUATION - INPATIENT     Room Number: 501/501-A  Evaluation Date: 5/3/2025  Type of Evaluation: Initial  Physician Order: PT Eval and Treat    Presenting Problem: COPD Exacerbation  Co-Morbidities : COPD, hospitalization March 2025 for PNA, falls, spinal stimulator, left rotator cuff tear  Reason for Therapy: Mobility Dysfunction and Discharge Planning    PHYSICAL THERAPY ASSESSMENT   Patient is a 80 year old male admitted 5/2/2025 for COPD Exacerbation, sepsis with UTI.  Prior to admission, patient's baseline is mod I with walker.  Patient is currently functioning below baseline with transfers, gait, and standing prolonged periods.  Patient is requiring contact guard assist as a result of the following impairments: decreased endurance/aerobic capacity, impaired dynamic standing balance, and medical status.  Physical Therapy will continue to follow for duration of hospitalization.    Patient will benefit from continued skilled PT Services at discharge to promote prior level of function.  Anticipate patient will return home with OP PT for increasing balance/endurance.    PLAN DURING HOSPITALIZATION  Nursing Mobility Recommendation : 1 Assist  PT Device Recommendation: Rolling walker, Gait belt  PT Treatment Plan: Endurance, Energy conservation, Patient education, Gait training, Strengthening, Balance training, Transfer training  Rehab Potential : Good  Frequency (Obs): 3-5x/week     CURRENT GOALS    Goal #1 Patient is able to demonstrate supine - sit EOB @ level: modified independent  MET   Goal #2 Patient is able to demonstrate transfers EOB to/from Chair/Wheelchair at assistance level: modified independent     Goal #3 Patient is able to ambulate 300 feet (to simulate distance to dining harding)with assist device: walker - rolling at assistance level: supervision     Goal #4    Goal #5    Goal #6    Goal Comments: Goals established on 5/3/2025      PHYSICAL THERAPY MEDICAL/SOCIAL HISTORY  History  related to current admission: Patient is a 80 year old male admitted on 5/2/2025 from home for difficulty breathing, fatigue, fall.  Pt diagnosed with COPD Exacerbation, sepsis. Pt reports has not been able to regain strength, nl mobility since PNA 3/2025.    Recent admission:  3/29-3/31/2025 with PNA with dc to home with HHPT    HOME SITUATION  Type of Home: Independent living facility  Home Layout: One level  Stairs to Enter :  (elevator)                  Lives With: Spouse    Drives: No   Patient Regularly Uses: Glasses, Home O2 (2 L/min at night)      Prior Level of [place]:   Pt recently finished OP PT for left shoulder rotator cuff tear and unsteadiness, reports he does not feel like he was done, but insurance determined no longer needed. Spouse assists as needed with adl's. Ambulates to dining harding for meals, with increasing difficulties. Reports frequent falls.  .   SUBJECTIVE  \"I'm breathing harder now\", \"I still haven't recovered from the pnuemonia\"    OBJECTIVE  Precautions: Bed/chair alarm  Fall Risk: High fall risk    WEIGHT BEARING RESTRICTION     PAIN ASSESSMENT  Rating:  (no c/o)          COGNITION  Overall Cognitive Status:  WFL - within functional limits    RANGE OF MOTION AND STRENGTH ASSESSMENT  Upper extremity ROM and strength : see OT note    Lower extremity ROM is within functional limits     Lower extremity strength is within functional limits     BALANCE  Static Sitting: Good  Dynamic Sitting: Not tested  Static Standing: Fair -  Dynamic Standing: Fair -    ADDITIONAL TESTS                                    ACTIVITY TOLERANCE  Pulse: 115  Heart Rate Source: Monitor                   O2 WALK  Oxygen Therapy  SPO2% on Room Air at Rest: 93  SPO2% on Oxygen at Rest: 96  At rest oxygen flow (liters per minute): 2  SPO2% Ambulation on Room Air: 94    NEUROLOGICAL FINDINGS  Neurological Findings: Sensation           Sensation: no c/o numbness/tingling         AM-PAC '6-Clicks' INPATIENT SHORT  FORM - BASIC MOBILITY  How much difficulty does the patient currently have...  Patient Difficulty: Turning over in bed (including adjusting bedclothes, sheets and blankets)?: None   Patient Difficulty: Sitting down on and standing up from a chair with arms (e.g., wheelchair, bedside commode, etc.): None   Patient Difficulty: Moving from lying on back to sitting on the side of the bed?: None   How much help from another person does the patient currently need...   Help from Another: Moving to and from a bed to a chair (including a wheelchair)?: None   Help from Another: Need to walk in hospital room?: A Little   Help from Another: Climbing 3-5 steps with a railing?: A Little     AM-PAC Score:  Raw Score: 22   Approx Degree of Impairment: 20.91%   Standardized Score (AM-PAC Scale): 53.28   CMS Modifier (G-Code): CJ    FUNCTIONAL ABILITY STATUS  Gait Assessment   Functional Mobility/Gait Assessment  Gait Assistance: Supervision  Distance (ft): 125  Assistive Device: Rolling walker  Pattern: Shuffle (flexed posture)    Skilled Therapy Provided     Bed Mobility:  Rolling: mod I  Supine to sit: mod I   Sit to supine: NT     Transfer Mobility:  Sit to stand: sba   Stand to sit: sba  Gait = cga/sba, stooped posture, no overt lob noted during session.     Therapist's Comments: Pt presents mayra-reclined in bed, agreeable to PT. RN approval for session noted. Pt mobility as above. O2 sats remain wfl on room air throughout session. Pt educated on energy conservation techniques during activity of walking, cues for posture and walker use. Pt notes increased fatigue and increased SOB with gait.    Exercise/Education Provided:  Bed mobility  Energy conservation  Functional activity tolerated  Gait training  Posture  Transfer training    Patient End of Session: Up in chair, Needs met, Call light within reach, RN aware of session/findings, All patient questions and concerns addressed, Alarm set (wearing crocs)      Patient Evaluation  Complexity Level:  History Low - no personal factors and/or co-morbidities   Examination of body systems Low -  addressing 1-2 elements   Clinical Presentation Low- Stable   Clinical Decision Making Low Complexity       PT Session Time: 25 minutes  Gait Training: 10 minutes  Therapeutic Activity: 5 minutes

## 2025-05-03 NOTE — CONSULTS
[facility]    Duke Fletcher Jose III Patient Status:  Inpatient    1945 MRN [id number]   Location 501/501-A PCP Roberto Henderson MD     Date of Admission: 2025    History of Present Illness: Pt is a 80 year old male consulted for dyspnea with h/o mild ILD, pulmonary HTN, asthma, rx'd home O2 which he uses at night and with his CPAP. Pt tells me that he had a pneumonia 3/2025 and has not felt good since then. Noted weakness at that time and had felt a little better but has had some dyspnea and intermittent weakness and worsened yesterday when he slid out of a chair and could not get up. Also notes brain fog. Cough is better than before but is now dry. Otherwise pt denies cough, chest pain, nausea, vomitting or diarrhea.       PMH:  Past Medical History[1]  Past Surgical History[2]    Allergies: Allergies[3]    Medications:  Outpatient Medications:  Medications Ordered Prior to Encounter[4]    Inpatient Medications:  Scheduled Medications:  Scheduled Medications[5]  Infusing Medications:  Medication Infusions[6]  PRN Medications:  PRN Medications[7]    Social History:    History   Smoking Status    Former    Types: Cigarettes   Smokeless Tobacco    Never       History   Alcohol Use    14.0 standard drinks of alcohol/week    14 Standard drinks or equivalent per week       History   Drug Use Unknown     Work:Retired [employer] sales.    TB: none  Asbestos: none    Family History[8]   REVIEW OF SYSTEMS:   A comprehensive 10 point review of systems was completed.  Pertinent positives and negatives noted in the the HPI.    OBJECTIVE:  Temp (24hrs), Av.6 °F (37 °C), Min:98.1 °F (36.7 °C), Max:99.6 °F (37.6 °C)   BP 99/50 (BP Location: Right arm)   Pulse 101   Temp 98.1 °F (36.7 °C) (Oral)   Resp 18   Wt 190 lb (86.2 kg)   SpO2 92%   BMI 30.67 kg/m²     Intake/Output Summary (Last 24 hours) at 5/3/2025 0915  Last data filed at 5/3/2025 0557  Gross per 24 hour   Intake 430 ml    Output 401 ml   Net 29 ml     O2: RA  General: NAD.   Neuro: Alert, no focal deficits.    HEENT: PERRL  Neck : No LAD  CV: RRR, nl S1, S2, no S4, S3 or murmur.   Lungs: Clear but decreased.   Abd: Nontender, non distended.   Ext: No edema.   Skin: No rashes.     Labs:  Recent Labs   Lab 05/02/25 1918 05/03/25 0614   WBC 11.3* 11.0   HGB 13.7 12.8*   HCT 39.5 36.5*   .0* 139.0*     Recent Labs   Lab 05/02/25 1918 05/03/25  0614   * 189*   BUN 17 14   CREATSERUM 0.97 0.95   CA 9.4 9.2    140   K 4.3 4.1    104   CO2 26.0 26.0   AST 32  --    ALT 13  --    ALB 4.4  --      Recent Labs   Lab 05/02/25 1918   INR 1.11   PTT 27.3     No results for input(s): \"ABGPHT\", \"JDAGMC6C\", \"WWKEE5R\", \"ABGHCO3\", \"SITE\", \"DEV\", \"THGB\" in the last 168 hours.  Recent Labs   Lab 05/02/25 1918   TROPHS 5     No results for input(s): \"BNP\" in the last 168 hours.  COVID-19 Lab Results    COVID-19  Lab Results   Component Value Date    COVID19 Not Detected 05/02/2025    COVID19 Not Detected 03/28/2025    COVID19 Not Detected 05/27/2022       Pro-Calcitonin  No results for input(s): \"PCT\" in the last 168 hours.    Cardiac  Recent Labs   Lab 05/02/25 1918   PBNP 693*       Creatinine Kinase  No results for input(s): \"CK\" in the last 168 hours.    Inflammatory Markers  Recent Labs   Lab 05/02/25 1918   DDIMER 0.76       Imaging:  CXR 5/2/25:  FINDINGS:  Lung volumes are satisfactory.  Decreasing opacities at the right lung base with minimal residual.  The right CP angle remains sharp.  Left base pleural parenchymal changes appear similar to the prior without improvement or worsening.  This  may be related to passive atelectasis due to an elevated left diaphragm.  Heart and pulmonary vessels appear stable, normal caliber.  Deformities from old right rib fractures.  Right humeral head bone anchors.     Impression   CONCLUSION:  Improved aeration to the right lung base.  Other findings appear stable.       Prior:    PFT 6/6/22, FEV1 1.92 liters (74%), ratio 69, TLC 89%, RV/%, DLCO 48%  PFT 8/24/23, FEV1 1.70 liters (72%), ratio 68, TLC 90%, RV/%, DLCO 49%  PFT 9/17/24, FEV1 1.65 liters (73%), ratio 69, TLC 93%, RV/%, DLCO 53%    CT Chest 2/11/23:  Lungs and large airways: There is scarring and atelectasis in both lung bases. Scattered calcified   granulomas are present. There is bronchial wall thickening bilaterally. Mild diffuse emphysematous   changes are present. There is platelike atelectasis or scarring in the lower lungs, particularly in   the left lower lobe and lingula.     Pleura:  Normal. No pleural effusion or thickening.     Heart and pericardium:  There is mild prominence of the left atrium. Mitral valve annulus   calcifications are present..     Mediastinum and logan: There are multiple calcified lymph nodes, compatible with remote granulomatous   infection.     Chest wall and lower neck:  There is a spinal stimulator device noted.     Vessels:  Atherosclerotic changes in the aorta and coronary arteries.     Bones:  Multilevel degenerative changes are seen in the spine. There are also degenerative changes   noted at both sternoclavicular joints.     Upper abdomen: Calcified splenic granulomas are present. There are tiny hypodensities in the liver,   incompletely evaluated on this exam. There is mild elevation of the left hemidiaphragm.     Echo 5/2024:      1. Left ventricle: The cavity size was normal. Wall thickness was at the      upper limits of normal. Systolic function was normal. The estimated      ejection fraction was 65-70%, by visual assessment. No diagnostic      evidence for regional wall motion abnormalities. Left ventricular      diastolic function parameters were normal for the patient's age.   2. Left atrium: The left atrial volume was moderately increased.   3. Right atrium: The atrium was mildly dilated.   4. Pulmonary arteries: Systolic pressure was at the upper limits of  normal      to, at most, mildly increased; in the range of 30 mm Hg to 35 mm Hg,      estimated to be 35mm Hg. The peak systolic pressure is 35mm Hg.   Impressions:  This study is compared with previous dated 03-14-23: No   significant change since prior study.   Assessment/Plan   Dyspnea secondary to pneumonia with h/o asthma, CPFE, pulmonary HTN>   O2 protocol and use with CPAP.   Steroids IV  Asthma, likely COPD with emphysematous changes, ILD c/w CPFE with pseudonormalization of lung volumes.   - Steroids as above.   - Advair 250 in lieu of symbicort.   - Add incruse given worsening sx.   - Cosndier daliresp or azithro.   ID: Pneumonia  - Rocephin 5/2 started.   Deescalate pending culture.   Proph  Subcutaneous heparin.   My best regards,         Jeramy Granda MD  [facility] Pul[facility], Critical Care [facility] Sleep Medicine                     [1]   Past Medical History:   Arthritis    Asthma (HCC)    Back problem    Cataract    COPD (chronic obstructive pulmonary disease) (HCC)    Degenerative disc disease, cervical    Eczema    Esophageal reflux    Essential hypertension    Hearing impairment    bilateral hearing aids    High blood pressure    Interstitial lung disease (HCC)    Muscle weakness    Osteoarthritis    Osteopenia    Problems with swallowing    Pulmonary hypertension (HCC)    Scoliosis    Sleep apnea    cpap    Thoracic kyphosis    Visual impairment   [2]   Past Surgical History:  Procedure Laterality Date    Appendectomy      Hernia surgery      Hip replacement surgery Left     Repair ing hernia,5+y/o,reducibl      Spine surgery procedure unlisted  2019    lumbar fusion    Spine surgery procedure unlisted      LUMBAR SURGERY X 2   [3]   Allergies  Allergen Reactions    Lisinopril ANGIOEDEMA and UNKNOWN    Tramadol ANGIOEDEMA and UNKNOWN    Codeine UNKNOWN   [4]   Current Facility-Administered Medications on File Prior to Encounter   Medication Dose Route Frequency Provider Last Rate Last Admin     [COMPLETED] azithromycin (Zithromax) 500 mg in sodium chloride 0.9% 250mL IVPB premix  500 mg Intravenous Once Nabil Miles  mL/hr at 03/28/25 2307 500 mg at 03/28/25 2307    [COMPLETED] cefTRIAXone (Rocephin) 2 g in sodium chloride 0.9% 100 mL IVPB-ADDV  2 g Intravenous Once Nabil Miles MD   Stopped at 03/28/25 2300    [COMPLETED] azithromycin (Zithromax) tab 500 mg  500 mg Oral Daily Anand Helms IV, MD   500 mg at 03/30/25 2223     Current Outpatient Medications on File Prior to Encounter   Medication Sig Dispense Refill    nitrofurantoin monohydrate macro 100 MG Oral Cap Take 1 capsule (100 mg total) by mouth 2 (two) times daily.      tamsulosin 0.4 MG Oral Cap Take 1 capsule (0.4 mg total) by mouth in the morning.      finasteride 5 MG Oral Tab Take 1 tablet (5 mg total) by mouth daily. 90 tablet 6    Mirabegron ER (MYRBETRIQ) 50 MG Oral Tablet 24 Hr Take 1 tablet (50 mg total) by mouth daily. 90 each 5    Solifenacin Succinate 10 MG Oral Tab Take 1 tablet (10 mg total) by mouth daily. 90 tablet 5    buPROPion 75 MG Oral Tab Take 1 tablet (75 mg total) by mouth in the morning.      famotidine 20 MG Oral Tab Take 1 tablet (20 mg total) by mouth in the morning and 1 tablet (20 mg total) before bedtime.      gabapentin 400 MG Oral Cap Take 2 capsules (800 mg total) by mouth 2 (two) times daily.      Tadalafil (CIALIS) 20 MG Oral Tab Take 1 tablet (20 mg total) by mouth daily as needed for Erectile Dysfunction. 30 tablet 5    montelukast 10 MG Oral Tab Take 1 tablet (10 mg total) by mouth nightly. (Patient taking differently: Take 1 tablet (10 mg total) by mouth every morning.)      celecoxib 200 MG Oral Cap Take 1 capsule (200 mg total) by mouth in the morning and 1 capsule (200 mg total) before bedtime.      escitalopram 10 MG Oral Tab Take 1 tablet (10 mg total) by mouth in the morning.      polyethylene glycol, PEG 3350, 17 g Oral Powd Pack Take 17 g by mouth daily as needed.       cholecalciferol 50 MCG (2000 UT) Oral Cap Take 500 Units by mouth in the morning.      albuterol (2.5 MG/3ML) 0.083% Inhalation Nebu Soln Take 3 mL (2.5 mg total) by nebulization every 6 (six) hours as needed for Wheezing.      MULTIPLE VITAMIN OR Take 1 tablet by mouth in the morning.      fexofenadine 180 MG Oral Tab Take 1 tablet (180 mg total) by mouth daily as needed.     [5]    buPROPion  75 mg Oral Daily    escitalopram  10 mg Oral Daily    cetirizine  10 mg Oral Daily    finasteride  5 mg Oral Daily    gabapentin  800 mg Oral BID    [Held by provider] Mirabegron ER  50 mg Oral Daily    montelukast  10 mg Oral QAM    oxybutynin ER  10 mg Oral Daily    tamsulosin  0.4 mg Oral Daily    famotidine  20 mg Oral Daily    methylPREDNISolone  40 mg Intravenous Q8H    ipratropium-albuterol  3 mL Nebulization q6h    cefTRIAXone  1 g Intravenous Q24H    heparin  5,000 Units Subcutaneous Q8H PENG   [6] [7]   acetaminophen    polyethylene glycol (PEG 3350)    sennosides    bisacodyl    fleet enema    ondansetron    metoclopramide    benzonatate    guaiFENesin  [8] History reviewed. No pertinent family history.

## 2025-05-04 LAB
ANION GAP SERPL CALC-SCNC: 11 MMOL/L (ref 0–18)
BASOPHILS # BLD AUTO: 0.02 X10(3) UL (ref 0–0.2)
BASOPHILS NFR BLD AUTO: 0.1 %
BUN BLD-MCNC: 19 MG/DL (ref 9–23)
CALCIUM BLD-MCNC: 9.7 MG/DL (ref 8.7–10.6)
CHLORIDE SERPL-SCNC: 104 MMOL/L (ref 98–112)
CO2 SERPL-SCNC: 27 MMOL/L (ref 21–32)
CREAT BLD-MCNC: 1.02 MG/DL (ref 0.7–1.3)
EGFRCR SERPLBLD CKD-EPI 2021: 73 ML/MIN/1.73M2 (ref 60–?)
EOSINOPHIL # BLD AUTO: 0.01 X10(3) UL (ref 0–0.7)
EOSINOPHIL NFR BLD AUTO: 0.1 %
ERYTHROCYTE [DISTWIDTH] IN BLOOD BY AUTOMATED COUNT: 13.2 %
GLUCOSE BLD-MCNC: 147 MG/DL (ref 70–99)
HCT VFR BLD AUTO: 36.6 % (ref 39–53)
HGB BLD-MCNC: 12.7 G/DL (ref 13–17.5)
IMM GRANULOCYTES # BLD AUTO: 0.11 X10(3) UL (ref 0–1)
IMM GRANULOCYTES NFR BLD: 0.8 %
LYMPHOCYTES # BLD AUTO: 0.87 X10(3) UL (ref 1–4)
LYMPHOCYTES NFR BLD AUTO: 6.4 %
MAGNESIUM SERPL-MCNC: 2.2 MG/DL (ref 1.6–2.6)
MCH RBC QN AUTO: 35.8 PG (ref 26–34)
MCHC RBC AUTO-ENTMCNC: 34.7 G/DL (ref 31–37)
MCV RBC AUTO: 103.1 FL (ref 80–100)
MONOCYTES # BLD AUTO: 0.47 X10(3) UL (ref 0.1–1)
MONOCYTES NFR BLD AUTO: 3.5 %
NEUTROPHILS # BLD AUTO: 12.06 X10 (3) UL (ref 1.5–7.7)
NEUTROPHILS # BLD AUTO: 12.06 X10(3) UL (ref 1.5–7.7)
NEUTROPHILS NFR BLD AUTO: 89.1 %
OSMOLALITY SERPL CALC.SUM OF ELEC: 299 MOSM/KG (ref 275–295)
PLATELET # BLD AUTO: 171 10(3)UL (ref 150–450)
POTASSIUM SERPL-SCNC: 4.8 MMOL/L (ref 3.5–5.1)
RBC # BLD AUTO: 3.55 X10(6)UL (ref 3.8–5.8)
SODIUM SERPL-SCNC: 142 MMOL/L (ref 136–145)
WBC # BLD AUTO: 13.5 X10(3) UL (ref 4–11)

## 2025-05-04 PROCEDURE — 94799 UNLISTED PULMONARY SVC/PX: CPT

## 2025-05-04 PROCEDURE — 94640 AIRWAY INHALATION TREATMENT: CPT

## 2025-05-04 PROCEDURE — 85025 COMPLETE CBC W/AUTO DIFF WBC: CPT | Performed by: INTERNAL MEDICINE

## 2025-05-04 PROCEDURE — 83735 ASSAY OF MAGNESIUM: CPT | Performed by: INTERNAL MEDICINE

## 2025-05-04 PROCEDURE — 80048 BASIC METABOLIC PNL TOTAL CA: CPT | Performed by: INTERNAL MEDICINE

## 2025-05-04 NOTE — PROGRESS NOTES
05/04/25 1747   Mobility   O2 walk? Yes   SPO2% on Room Air at Rest 92   SPO2% Ambulation on Room Air 87   SPO2% Ambulation on Oxygen 90   Ambulation oxygen flow (liters per minute) 2

## 2025-05-04 NOTE — PROGRESS NOTES
[facility]    Duke Garcia III Patient Status:  Inpatient    1945 MRN [id number]   Location [facility] 5NW-A Attending Justin Berg MD   Hosp Day # 2 PCP Roberto Henderson MD     Date of Admission: 2025  7:48 PM    Admission Diagnosis: COPD exacerbation (HCC) [J44.1]  Sepsis secondary to UTI (HCC) [A41.9, N39.0]    S: Feeling better.     Scheduled Medications:  Scheduled Medications[1]    Infusing Medications:  Medication Infusions[2]    PRN Medications:  PRN Medications[3]    OBJECTIVE:  /83 (BP Location: Left arm)   Pulse 98   Temp 98.3 °F (36.8 °C) (Oral)   Resp 16   Wt 190 lb (86.2 kg)   SpO2 91%   BMI 30.67 kg/m²    Temp (24hrs), Av.1 °F (36.7 °C), Min:97.9 °F (36.6 °C), Max:98.3 °F (36.8 °C)         Wt Readings from Last 3 Encounters:   25 190 lb (86.2 kg)   25 188 lb (85.3 kg)   24 190 lb (86.2 kg)       Intake/Output Summary (Last 24 hours) at 2025 1006  Last data filed at 5/3/2025 1130  Gross per 24 hour   Intake 240 ml   Output --   Net 240 ml     O2: RA  General: NAD.   Neuro: Alert, no focal deficits.    HEENT: PERRL  Neck : No LAD  CV: RRR, nl S1, S2, no S4, S3 or murmur.   Lungs: Clear bilaterally.   Abd: Nontender, non distended.   Ext: No edema.   Skin: No rashes.     Recent Labs   Lab 25  0614 25  0621   WBC 11.3* 11.0 13.5*   HGB 13.7 12.8* 12.7*   HCT 39.5 36.5* 36.6*   .0* 139.0* 171.0     Recent Labs   Lab 25  0614 25  0621   * 189* 147*   BUN 17 14 19   CREATSERUM 0.97 0.95 1.02   CA 9.4 9.2 9.7    140 142   K 4.3 4.1 4.8    104 104   CO2 26.0 26.0 27.0   AST 32  --   --    ALT 13  --   --    ALB 4.4  --   --      Recent Labs   Lab 25  1918   INR 1.11   PTT 27.3     No results for input(s): \"ABGPHT\", \"HGPLQH7K\", \"VYWJE1X\", \"ABGHCO3\", \"SITE\", \"DEV\", \"THGB\" in the last 168 hours.    COVID-19 Lab Results    COVID-19  Lab  Results   Component Value Date    COVID19 Not Detected 05/02/2025    COVID19 Not Detected 03/28/2025    COVID19 Not Detected 05/27/2022       Pro-Calcitonin  No results for input(s): \"PCT\" in the last 168 hours.    Cardiac  Recent Labs   Lab 05/02/25 1918   PBNP 693*       Creatinine Kinase  No results for input(s): \"CK\" in the last 168 hours.    Inflammatory Markers  Recent Labs   Lab 05/02/25 1918   DDIMER 0.76       Imaging:     CXR 5/2/25:  FINDINGS:  Lung volumes are satisfactory.  Decreasing opacities at the right lung base with minimal residual.  The right CP angle remains sharp.  Left base pleural parenchymal changes appear similar to the prior without improvement or worsening.  This  may be related to passive atelectasis due to an elevated left diaphragm.  Heart and pulmonary vessels appear stable, normal caliber.  Deformities from old right rib fractures.  Right humeral head bone anchors.      Impression   CONCLUSION:  Improved aeration to the right lung base.  Other findings appear stable.       Prior:   PFT 6/6/22, FEV1 1.92 liters (74%), ratio 69, TLC 89%, RV/%, DLCO 48%  PFT 8/24/23, FEV1 1.70 liters (72%), ratio 68, TLC 90%, RV/%, DLCO 49%  PFT 9/17/24, FEV1 1.65 liters (73%), ratio 69, TLC 93%, RV/%, DLCO 53%     CT Chest 2/11/23:  Lungs and large airways: There is scarring and atelectasis in both lung bases. Scattered calcified   granulomas are present. There is bronchial wall thickening bilaterally. Mild diffuse emphysematous   changes are present. There is platelike atelectasis or scarring in the lower lungs, particularly in   the left lower lobe and lingula.     Pleura:  Normal. No pleural effusion or thickening.     Heart and pericardium:  There is mild prominence of the left atrium. Mitral valve annulus   calcifications are present..     Mediastinum and logan: There are multiple calcified lymph nodes, compatible with remote granulomatous   infection.     Chest wall and  lower neck:  There is a spinal stimulator device noted.     Vessels:  Atherosclerotic changes in the aorta and coronary arteries.     Bones:  Multilevel degenerative changes are seen in the spine. There are also degenerative changes   noted at both sternoclavicular joints.     Upper abdomen: Calcified splenic granulomas are present. There are tiny hypodensities in the liver,   incompletely evaluated on this exam. There is mild elevation of the left hemidiaphragm.     Echo 5/2024:  1. Left ventricle: The cavity size was normal. Wall thickness was at the      upper limits of normal. Systolic function was normal. The estimated      ejection fraction was 65-70%, by visual assessment. No diagnostic      evidence for regional wall motion abnormalities. Left ventricular      diastolic function parameters were normal for the patient's age.   2. Left atrium: The left atrial volume was moderately increased.   3. Right atrium: The atrium was mildly dilated.   4. Pulmonary arteries: Systolic pressure was at the upper limits of normal      to, at most, mildly increased; in the range of 30 mm Hg to 35 mm Hg,      estimated to be 35mm Hg. The peak systolic pressure is 35mm Hg.   Impressions:  This study is compared with previous dated 03-14-23: No   significant change since prior study.   Assessment/Plan   Dyspnea secondary to pneumonia with h/o asthma, CPFE, pulmonary HTN  O2 protocol and use with CPAP.   Steroids IV  Asthma, likely COPD with emphysematous changes, ILD c/w CPFE with pseudonormalization of lung volumes.   - Steroids as above.   - Advair 250 in lieu of symbicort.   - Add incruse given worsening sx.   - Cosndier daliresp or azithro.   ID: Pneumonia  - Rocephin 5/2 started.   Deescalate pending culture. NGTD and legionella negative.   Proph  Subcutaneous heparin.     My best regards,         Jeramy Granda MD  [facility], Critical Care and Sleep Medicine               [1]    buPROPion  75 mg Oral  Daily    cetirizine  10 mg Oral Daily    finasteride  5 mg Oral Daily    gabapentin  800 mg Oral BID    [Held by provider] Mirabegron ER  50 mg Oral Daily    montelukast  10 mg Oral QAM    oxybutynin ER  10 mg Oral Daily    famotidine  20 mg Oral Daily    fluticasone-salmeterol  1 puff Inhalation BID    umeclidinium bromide  1 puff Inhalation Daily    cefTRIAXone  2 g Intravenous Q24H    methylPREDNISolone  40 mg Intravenous Q8H    ipratropium-albuterol  3 mL Nebulization q6h    heparin  5,000 Units Subcutaneous Q8H PENG   [2] [3]   acetaminophen    polyethylene glycol (PEG 3350)    sennosides    bisacodyl    fleet enema    ondansetron    metoclopramide    benzonatate    guaiFENesin

## 2025-05-04 NOTE — PLAN OF CARE
Patient A&O x 4. MAL on CPAP. NSR on tele.  Heparin for DVT prophylactic   PIV to the right AC. Saline locked. IV Abx. IV steroid.   Continent x 2. Regular diet. No complaints of pain. PRN cough medicine provided. SBA with walker   Consults: Hosp/Pulm-[facility]   Plan of care updated. Patient continuously rounded on           Problem: Patient/Family Goals  Goal: Patient/Family Long Term Goal  Description: Patient's Long Term Goal: Discharge with proper resources   Interventions:- Follow POC- See additional Care Plan goals for specific interventions  Outcome: Progressing  Goal: Patient/Family Short Term Goal  Description: Patient's Short Term Goal: 5/2 NOC: Get rest                                              5/3 NOC: Maintain O2 stats   Interventions: - Cluster care- See additional Care Plan goals for specific interventions  Outcome: Progressing

## 2025-05-04 NOTE — PROGRESS NOTES
[facility] Hospitalist Progress Note                                                                     [facility]   [facility] of [facility]      Duke Garcia III  2/25/1945    SUBJECTIVE:no chest pain, palpitations, nausea, vomiting, abdominal pain. SOB improved. No cough.    OBJECTIVE:  Temp:  [97.9 °F (36.6 °C)-98.1 °F (36.7 °C)] 98.1 °F (36.7 °C)  Pulse:  [] 90  Resp:  [16-18] 16  BP: ()/(50-83) 105/57  SpO2:  [90 %-96 %] 96 %  Exam  Gen: No acute distress, alert and oriented  Pulm: Lungs clear bilaterally, normal respiratory effort, no crackles, no wheezing  CV: Heart with regular rate and rhythm, no murmur.   Abd: Abdomen soft, nontender, nondistended, bowel sounds present  MSK: no significant pitting edema or tenderness of the LE  Skin: no rashes or lesions    Labs:   Recent Labs   Lab 05/02/25 1918 05/03/25 0614 05/04/25  0621   WBC 11.3* 11.0 13.5*   HGB 13.7 12.8* 12.7*   .1* 102.5* 103.1*   .0* 139.0* 171.0   INR 1.11  --   --        Recent Labs   Lab 05/02/25 1918 05/03/25  0614 05/04/25  0621    140 142   K 4.3 4.1 4.8    104 104   CO2 26.0 26.0 27.0   BUN 17 14 19   CREATSERUM 0.97 0.95 1.02   CA 9.4 9.2 9.7   MG  --  1.9 2.2   * 189* 147*       Recent Labs   Lab 05/02/25 1918   ALT 13   AST 32   ALB 4.4       No results for input(s): \"PGLU\" in the last 168 hours.    Meds:   Scheduled: Scheduled Medications[1]  Continuous Infusions: Medication Infusions[2]  PRN: PRN Medications[3]  ASSESSMENT / PLAN:   80 year old male with history of asthma, COPD, GERD, hypertension, interstitial lung disease, osteoarthritis, osteopenia, pulmonary hypertension, sleep apnea using CPAP presenting with generalized weakness and shortness of breath.       COPD exacerbation   -iv steroids  -neb  -pulm following     UTI  -iv abx  -cx pending     Weakness  -secondary to to above  -PT/OT---> home with pT/oT      Asthma  COPD  Pulm HTN  -incruse  -Advair     Anxiety  Depression  -bupropion     GERD  -famotidine      BPH  -finasteride     MAL  -cpap     Quality:  DVT Prophylaxis: scd, heparin sq  CODE status:   Thakur: none     Plan of care discussed with patient and staff     Dispo: possible discharge     MD Gemma Lyons Hospitalist  [phone]           [1]    buPROPion  75 mg Oral Daily    cetirizine  10 mg Oral Daily    finasteride  5 mg Oral Daily    gabapentin  800 mg Oral BID    [Held by provider] Mirabegron ER  50 mg Oral Daily    montelukast  10 mg Oral QAM    oxybutynin ER  10 mg Oral Daily    famotidine  20 mg Oral Daily    fluticasone-salmeterol  1 puff Inhalation BID    umeclidinium bromide  1 puff Inhalation Daily    cefTRIAXone  2 g Intravenous Q24H    methylPREDNISolone  40 mg Intravenous Q8H    ipratropium-albuterol  3 mL Nebulization q6h    heparin  5,000 Units Subcutaneous Q8H [facility]   [2] [3]   acetaminophen    polyethylene glycol (PEG 3350)    sennosides    bisacodyl    fleet enema    ondansetron    metoclopramide    benzonatate    guaiFENesin

## 2025-05-04 NOTE — PLAN OF CARE
Pt is a&ox3-4, forgetful at times. RA. Requiring 2L with ambulation.MAL/CPAP on nocs. Nebs. NSR, ST on tele. IV Solumedrol. IV abx. Heparin. Voids per BRP, up SBA w/ walker. Tolerating a regular diet. Pt and wife updated on poc. No further needs at this time.    Problem: Patient/Family Goals  Goal: Patient/Family Long Term Goal  Description: Patient's Long Term Goal: Discharge with proper resources   Interventions:- Follow POC- See additional Care Plan goals for specific interventions  Outcome: Progressing  Goal: Patient/Family Short Term Goal  Description: Patient's Short Term Goal: 5/2 NOC: Get rest                                              5/3 NOC: Maintain O2 stats   Interventions: - Cluster care- See additional Care Plan goals for specific interventions  Outcome: Progressing

## 2025-05-05 VITALS
BODY MASS INDEX: 31 KG/M2 | WEIGHT: 190 LBS | TEMPERATURE: 98 F | RESPIRATION RATE: 18 BRPM | OXYGEN SATURATION: 91 % | SYSTOLIC BLOOD PRESSURE: 137 MMHG | HEART RATE: 92 BPM | DIASTOLIC BLOOD PRESSURE: 89 MMHG

## 2025-05-05 LAB
ANION GAP SERPL CALC-SCNC: 10 MMOL/L (ref 0–18)
BASOPHILS # BLD AUTO: 0.01 X10(3) UL (ref 0–0.2)
BASOPHILS NFR BLD AUTO: 0.1 %
BUN BLD-MCNC: 16 MG/DL (ref 9–23)
CALCIUM BLD-MCNC: 9.2 MG/DL (ref 8.7–10.6)
CHLORIDE SERPL-SCNC: 104 MMOL/L (ref 98–112)
CO2 SERPL-SCNC: 28 MMOL/L (ref 21–32)
CREAT BLD-MCNC: 0.91 MG/DL (ref 0.7–1.3)
EGFRCR SERPLBLD CKD-EPI 2021: 84 ML/MIN/1.73M2 (ref 60–?)
EOSINOPHIL # BLD AUTO: 0 X10(3) UL (ref 0–0.7)
EOSINOPHIL NFR BLD AUTO: 0 %
ERYTHROCYTE [DISTWIDTH] IN BLOOD BY AUTOMATED COUNT: 13.4 %
GLUCOSE BLD-MCNC: 144 MG/DL (ref 70–99)
HCT VFR BLD AUTO: 35.5 % (ref 39–53)
HGB BLD-MCNC: 12.3 G/DL (ref 13–17.5)
IMM GRANULOCYTES # BLD AUTO: 0.06 X10(3) UL (ref 0–1)
IMM GRANULOCYTES NFR BLD: 0.8 %
LYMPHOCYTES # BLD AUTO: 0.95 X10(3) UL (ref 1–4)
LYMPHOCYTES NFR BLD AUTO: 11.9 %
MAGNESIUM SERPL-MCNC: 2.2 MG/DL (ref 1.6–2.6)
MCH RBC QN AUTO: 35.5 PG (ref 26–34)
MCHC RBC AUTO-ENTMCNC: 34.6 G/DL (ref 31–37)
MCV RBC AUTO: 102.6 FL (ref 80–100)
MONOCYTES # BLD AUTO: 0.39 X10(3) UL (ref 0.1–1)
MONOCYTES NFR BLD AUTO: 4.9 %
NEUTROPHILS # BLD AUTO: 6.59 X10 (3) UL (ref 1.5–7.7)
NEUTROPHILS # BLD AUTO: 6.59 X10(3) UL (ref 1.5–7.7)
NEUTROPHILS NFR BLD AUTO: 82.3 %
OSMOLALITY SERPL CALC.SUM OF ELEC: 298 MOSM/KG (ref 275–295)
PLATELET # BLD AUTO: 161 10(3)UL (ref 150–450)
POTASSIUM SERPL-SCNC: 4.2 MMOL/L (ref 3.5–5.1)
RBC # BLD AUTO: 3.46 X10(6)UL (ref 3.8–5.8)
SODIUM SERPL-SCNC: 142 MMOL/L (ref 136–145)
WBC # BLD AUTO: 8 X10(3) UL (ref 4–11)

## 2025-05-05 PROCEDURE — 94640 AIRWAY INHALATION TREATMENT: CPT

## 2025-05-05 PROCEDURE — 80048 BASIC METABOLIC PNL TOTAL CA: CPT | Performed by: INTERNAL MEDICINE

## 2025-05-05 PROCEDURE — 94760 N-INVAS EAR/PLS OXIMETRY 1: CPT

## 2025-05-05 PROCEDURE — 94799 UNLISTED PULMONARY SVC/PX: CPT

## 2025-05-05 PROCEDURE — 85025 COMPLETE CBC W/AUTO DIFF WBC: CPT | Performed by: INTERNAL MEDICINE

## 2025-05-05 PROCEDURE — 83735 ASSAY OF MAGNESIUM: CPT | Performed by: INTERNAL MEDICINE

## 2025-05-05 RX ORDER — PREDNISONE 20 MG/1
TABLET ORAL
Qty: 15 TABLET | Refills: 0 | Status: SHIPPED | OUTPATIENT
Start: 2025-05-05

## 2025-05-05 RX ORDER — PREDNISONE 20 MG/1
40 TABLET ORAL
Status: DISCONTINUED | OUTPATIENT
Start: 2025-05-05 | End: 2025-05-05

## 2025-05-05 RX ORDER — CEFUROXIME AXETIL 500 MG/1
500 TABLET ORAL 2 TIMES DAILY
Qty: 8 TABLET | Refills: 0 | Status: SHIPPED | OUTPATIENT
Start: 2025-05-05 | End: 2025-05-09

## 2025-05-05 NOTE — CM/SW NOTE
Department  notified of request for HHC, aidin referrals started. Assigned CM/SW to follow up with pt/family on further discharge planning.     Wendie Landeros, DSC

## 2025-05-05 NOTE — PROGRESS NOTES
[facility]    Duke Garcia III Patient Status:  Inpatient    1945 MRN [id number]   Location [facility] 5NW-A Attending Justin Berg MD   Hosp Day # 3 PCP Roberto Henderson MD     SUBJECTIVE: Pt feels well, wants to go home.  Denies SOB currently.    OBJECTIVE:  /76 (BP Location: Right arm)   Pulse 82   Temp 97.4 °F (36.3 °C) (Axillary)   Resp 16   Wt 190 lb (86.2 kg)   SpO2 94%   BMI 30.67 kg/m²   O2 requirement: RA     No intake/output data recorded.  No intake/output data recorded.     Current Medications: Current Hospital Medications[1]     Physical Exam:                          General: alert, cooperative, in NAD                          HEENT: oropharynx clear without erythema or exudates, moist mucous membranes                          Lungs: Clear to auscultation bilaterally, no wheezes or crackles                           Chest wall: No tenderness or deformity.                          Heart: Regular rate and rhythm, normal S1S2,                          Abdomen: soft, non-tender, non-distended, positive BS.                          Extremity: No clubbing or cyanosis. no edema                          Skin: No rashes or lesions.         Lab Results   Component Value Date    WBC 8.0 2025    RBC 3.46 2025    HGB 12.3 2025    HCT 35.5 2025    .6 2025    MCH 35.5 2025    MCHC 34.6 2025    RDW 13.4 2025    .0 2025     Lab Results   Component Value Date     2025    K 4.2 2025     2025    CO2 28.0 2025    BUN 16 2025    CREATSERUM 0.91 2025     2025    CA 9.2 2025     Lab Results   Component Value Date    INR 1.11 2025          Imaging: I have independently visualized all relevant chest imaging in PACS.  I agree with the radiology interpretation except where noted.       PFT 22, FEV1 1.92 liters (74%), ratio 69, TLC 89%, RV/%,  DLCO 48%  PFT 8/24/23, FEV1 1.70 liters (72%), ratio 68, TLC 90%, RV/%, DLCO 49%  PFT 9/17/24, FEV1 1.65 liters (73%), ratio 69, TLC 93%, RV/%, DLCO 53%     ASSESSMENT/PLAN:  Dyspnea secondary to pneumonia with h/o asthma, CPFE, pulmonary HTN  -O2 protocol and use with CPAP.   -will transition IV steroids to PO  Asthma, likely COPD with emphysematous changes, ILD c/w CPFE with pseudonormalization of lung volumes.   - Steroids as above.   - Advair 250 in lieu of home symbicort  - Add incruse given worsening sx  - Consider daliresp or azithro as OP  ID: Pneumonia  - Rocephin (5/2 -  ), will transition to cefuroxime on discharge  -Deescalate pending culture. NGTD and legionella negative  Proph  -Subcutaneous heparin.   Dispo:  -stable from pulm perspective for discharge home  -pt to follow up with Dr. Helms IV in 1-2 weeks    Yvette Keane MD  5/5/2025  8:32 AM     ADDENDUM:  I had a face to face visit with this patient and I evaluated the patient's O2 saturations.  The patient is mobile in their home and is in need of a portable oxygen tank.  The home oxygen will improve the patient's condition.     Darwin CARRION         [1]   Current Facility-Administered Medications:     buPROPion (Wellbutrin) tab 75 mg, 75 mg, Oral, Daily    cetirizine (ZyrTEC) tab 10 mg, 10 mg, Oral, Daily    finasteride (Proscar) tab 5 mg, 5 mg, Oral, Daily    gabapentin (Neurontin) cap 800 mg, 800 mg, Oral, BID    [Held by provider] Mirabegron ER (Myrbetriq) 50 MG tablet TB24 50 mg [Patient supplied med], 50 mg, Oral, Daily    montelukast (Singulair) tab 10 mg, 10 mg, Oral, QAM    oxybutynin ER (Ditropan-XL) 24 hr tab 10 mg, 10 mg, Oral, Daily    famotidine (Pepcid) tab 20 mg, 20 mg, Oral, Daily    fluticasone-salmeterol (Advair Diskus) 250-50 MCG/ACT inhaler 1 puff, 1 puff, Inhalation, BID    umeclidinium bromide (Incruse Ellipta) 62.5 MCG/ACT inhaler 1 puff, 1 puff, Inhalation, Daily    cefTRIAXone (Rocephin) 2 g in  sodium chloride 0.9% 100 mL IVPB-ADDV, 2 g, Intravenous, Q24H    methylPREDNISolone sodium succinate (Solu-MEDROL) injection 40 mg, 40 mg, Intravenous, Q8H    ipratropium-albuterol (Duoneb) 0.5-2.5 (3) MG/3ML inhalation solution 3 mL, 3 mL, Nebulization, q6h    heparin (Porcine) 5000 UNIT/ML injection 5,000 Units, 5,000 Units, Subcutaneous, Q8H PENG    acetaminophen (Tylenol Extra Strength) tab 500 mg, 500 mg, Oral, Q4H PRN    polyethylene glycol (PEG 3350) (Miralax) 17 g oral packet 17 g, 17 g, Oral, Daily PRN    sennosides (Senokot) tab 17.2 mg, 17.2 mg, Oral, Nightly PRN    bisacodyl (Dulcolax) 10 MG rectal suppository 10 mg, 10 mg, Rectal, Daily PRN    fleet enema (Fleet) rectal enema 133 mL, 1 enema, Rectal, Once PRN    ondansetron (Zofran) 4 MG/2ML injection 4 mg, 4 mg, Intravenous, Q6H PRN    metoclopramide (Reglan) 5 mg/mL injection 5 mg, 5 mg, Intravenous, Q8H PRN    benzonatate (Tessalon) cap 200 mg, 200 mg, Oral, TID PRN    guaiFENesin (Robitussin) 100 MG/5 ML oral liquid 200 mg, 200 mg, Oral, Q4H PRN

## 2025-05-05 NOTE — DISCHARGE INSTRUCTIONS
Your home oxygen is through:  [facility]  Phone: [phone]  Fax: [phone]      Monitor your oxygen levels. Wear 3L of oxygen with activity when your oxygen saturation falls below 88%.

## 2025-05-05 NOTE — PROGRESS NOTES
[facility] Hospitalist Progress Note                                                                     [facility]   part of [facility]      Duke Garcia III  2/25/1945    SUBJECTIVE:no chest pain, palpitations, nausea, vomiting, abdominal pain. SOB improved. No cough.    OBJECTIVE:  Temp:  [97.4 °F (36.3 °C)-98.3 °F (36.8 °C)] 97.4 °F (36.3 °C)  Pulse:  [82-97] 82  Resp:  [16] 16  BP: (132-136)/(70-89) 136/76  SpO2:  [94 %-95 %] 94 %  Exam  Gen: No acute distress, alert and oriented  Pulm: Lungs clear bilaterally, normal respiratory effort, no crackles, no wheezing  CV: Heart with regular rate and rhythm, no murmur.   Abd: Abdomen soft, nontender, nondistended, bowel sounds present  MSK: no significant pitting edema or tenderness of the LE  Skin: no rashes or lesions    Labs:   Recent Labs   Lab 05/02/25 1918 05/03/25 0614 05/04/25 0621 05/05/25  0654   WBC 11.3* 11.0 13.5* 8.0   HGB 13.7 12.8* 12.7* 12.3*   .1* 102.5* 103.1* 102.6*   .0* 139.0* 171.0 161.0   INR 1.11  --   --   --        Recent Labs   Lab 05/02/25 1918 05/03/25 0614 05/04/25 0621 05/05/25  0654    140 142 142   K 4.3 4.1 4.8 4.2    104 104 104   CO2 26.0 26.0 27.0 28.0   BUN 17 14 19 16   CREATSERUM 0.97 0.95 1.02 0.91   CA 9.4 9.2 9.7 9.2   MG  --  1.9 2.2 2.2   * 189* 147* 144*       Recent Labs   Lab 05/02/25 1918   ALT 13   AST 32   ALB 4.4       No results for input(s): \"PGLU\" in the last 168 hours.    Meds:   Scheduled: Scheduled Medications[1]  Continuous Infusions: Medication Infusions[2]  PRN: PRN Medications[3]  ASSESSMENT / PLAN:   80 year old male with history of asthma, COPD, GERD, hypertension, interstitial lung disease, osteoarthritis, osteopenia, pulmonary hypertension, sleep apnea using CPAP presenting with generalized weakness and shortness of breath.       COPD exacerbation  PNA   -iv steroids  -IV abx  -possible transition to po  today pending pulm eval  -neb  -pulm following     UTI  -iv abx  -cx pending--> ngtd     Weakness  -secondary to to above  -PT/OT---> home with pT/oT     Asthma  COPD  Pulm HTN  -incruse  -Advair     Anxiety  Depression  -bupropion     GERD  -famotidine      BPH  -finasteride     MAL  -cpap     Quality:  DVT Prophylaxis: scd, heparin sq  CODE status:   Thakur: none     Plan of care discussed with patient and staff     Dispo: possible discharge     Justin Berg MD  [facility] Hospitalist  [phone]           [1]    buPROPion  75 mg Oral Daily    cetirizine  10 mg Oral Daily    finasteride  5 mg Oral Daily    gabapentin  800 mg Oral BID    [Held by provider] Mirabegron ER  50 mg Oral Daily    montelukast  10 mg Oral QAM    oxybutynin ER  10 mg Oral Daily    famotidine  20 mg Oral Daily    fluticasone-salmeterol  1 puff Inhalation BID    umeclidinium bromide  1 puff Inhalation Daily    cefTRIAXone  2 g Intravenous Q24H    methylPREDNISolone  40 mg Intravenous Q8H    ipratropium-albuterol  3 mL Nebulization q6h    heparin  5,000 Units Subcutaneous Q8H PENG   [2] [3]   acetaminophen    polyethylene glycol (PEG 3350)    sennosides    bisacodyl    fleet enema    ondansetron    metoclopramide    benzonatate    guaiFENesin

## 2025-05-05 NOTE — PROGRESS NOTES
SP02% on room air at rest: 93%    Spo2 Ambulation on room air: 88%    Spo2 Ambulation on o2:  90%           On: 3 Liters Per Minute

## 2025-05-05 NOTE — PLAN OF CARE
Patient A&O x 4. MAL on CPAP. NSR on tele.  Heparin for DVT prophylactic   PIV to the right AC. Saline locked. IV Abx. IV steroid. O2 needed during day while walking   Continent x 2. Regular diet. No complaints of pain. PRN cough medicine provided. SBA with walker   Consults: Hosp/Pulm-[facility]   Plan of care updated. Patient continuously rounded on     Problem: Patient/Family Goals  Goal: Patient/Family Long Term Goal  Description: Patient's Long Term Goal: Discharge with proper resources   Interventions:- Follow POC- See additional Care Plan goals for specific interventions  Outcome: Progressing  Goal: Patient/Family Short Term Goal  Description: Patient's Short Term Goal: 5/2 NOC: Get rest                                              5/3 NOC: Maintain O2 stats                                              5/4 NOC: Maintain safety    Interventions: - Cluster care- See additional Care Plan goals for specific interventions  Outcome: Progressing

## 2025-05-05 NOTE — CM/SW NOTE
Department  notified of request for DME, aidin referrals started. Assigned CM/SW to follow up with pt/family on further discharge planning.     Wendie Landeros, DSC

## 2025-05-05 NOTE — CM/SW NOTE
Informed by RN that patient will need home oxygen at discharge.  DSC to send referral on aidin.     SW/CM to remain available for support and/or discharge planning.    Addendum 3:40pm: Patient actually already has home o2 (concentrator and 2 POCs) and CPAP through [facility]. Offered patient tank for dc, they declined.       Augusta MONTIEL LCSW  Discharge Planner      Oxygen:  I had a face to face visit with Duke Garcia III and I reviewed the patient's walking O2 study completed by nursing on 5/5/2025. The patient is mobile in their home and is in need of a portable oxygen tank. The home oxygen will improve the Duke Garcia III's condition.

## 2025-05-06 NOTE — PROGRESS NOTES
NURSING DISCHARGE NOTE    Discharged Home via Wheelchair.  Accompanied by Family member and Support staff  Belongings Taken by patient/family.    IV discontinued. Went over discharge instructions and prescriptions with pt and family. They verbalized understanding. All discharge paperwork was sent with the pt.

## 2025-05-07 NOTE — DISCHARGE SUMMARY
[facility]   part of [facility]    DISCHARGE SUMMARY     Duke Garcia III Patient Status:  Inpatient    1945 MRN [id number]   Location [facility] 5NW-A Attending No att. providers found   Hosp Day # 3 PCP Roberto Henderson MD     Date of Admission: 2025  Date of Discharge: 2025  Discharge Disposition: Home or Self Care    Discharge Diagnosis: COPD exacerbation, pneumonia, UTI    History of Present Illness: 80 year old male with history of asthma, COPD, GERD, hypertension, interstitial lung disease, osteoarthritis, osteopenia, pulmonary hypertension, sleep apnea using CPAP presenting with generalized weakness and shortness of breath.  Patient was seen in consultation with pulmonary.  Patient was started on IV antibiotics as well as IV steroids for COPD exacerbation and pneumonia.  Patient's UA was abnormal.  Culture did come back negative.  Patient already on antibiotics for pneumonia.  Patient clinically improved from respiratory standpoint.  Patient was cleared by pulmonary for discharge on oral steroids and oral antibiotics.  Patient clinically stable, vital stable, labs stable for discharge.  Patient and family agreeable discharge.    Discharge Medication List:     Discharge Medications        START taking these medications        Instructions Prescription details   cefuroxime 500 MG Tabs  Commonly known as: Ceftin      Take 1 tablet (500 mg total) by mouth 2 (two) times daily for 4 days.   Stop taking on: May 9, 2025  Quantity: 8 tablet  Refills: 0     predniSONE 20 MG Tabs  Commonly known as: Deltasone      Take 2 tabs daily x5 days, then one tab daily x5 days   Quantity: 15 tablet  Refills: 0     umeclidinium bromide 62.5 MCG/ACT Aepb  Commonly known as: Incruse Ellipta      Inhale 1 puff into the lungs daily.   Quantity: 1 each  Refills: 0            CHANGE how you take these medications        Instructions Prescription details   montelukast 10 MG Tabs  Commonly known as:  Singulair  What changed: when to take this      Take 1 tablet (10 mg total) by mouth nightly.   Refills: 0            CONTINUE taking these medications        Instructions Prescription details   albuterol (2.5 MG/3ML) 0.083% Nebu  Commonly known as: Ventolin      Take 3 mL (2.5 mg total) by nebulization every 6 (six) hours as needed for Wheezing.   Refills: 0     buPROPion 75 MG Tabs  Commonly known as: Wellbutrin      Take 1 tablet (75 mg total) by mouth in the morning.   Refills: 0     celecoxib 200 MG Caps  Commonly known as: CeleBREX      Take 1 capsule (200 mg total) by mouth in the morning and 1 capsule (200 mg total) before bedtime.   Refills: 0     cholecalciferol 50 MCG (2000 UT) Caps  Commonly known as: Vitamin D3      Take 500 Units by mouth in the morning.   Refills: 0     famotidine 20 MG Tabs  Commonly known as: Pepcid      Take 1 tablet (20 mg total) by mouth in the morning and 1 tablet (20 mg total) before bedtime.   Refills: 0     fexofenadine 180 MG Tabs  Commonly known as: Allegra      Take 1 tablet (180 mg total) by mouth daily as needed.   Refills: 0     finasteride 5 MG Tabs  Commonly known as: Proscar      Take 1 tablet (5 mg total) by mouth daily.   Quantity: 90 tablet  Refills: 6     gabapentin 400 MG Caps  Commonly known as: Neurontin      Take 2 capsules (800 mg total) by mouth 2 (two) times daily.   Refills: 0     Mirabegron ER 50 MG Tb24  Commonly known as: Myrbetriq      Take 1 tablet (50 mg total) by mouth daily.   Quantity: 90 each  Refills: 5     MULTIPLE VITAMIN OR      Take 1 tablet by mouth in the morning.   Refills: 0     polyethylene glycol (PEG 3350) 17 g Pack  Commonly known as: Miralax      Take 17 g by mouth daily as needed.   Refills: 0     Solifenacin Succinate 10 MG Tabs  Commonly known as: VESICARE      Take 1 tablet (10 mg total) by mouth daily.   Quantity: 90 tablet  Refills: 5            STOP taking these medications      escitalopram 10 MG Tabs  Commonly known as:  Lexapro        nitrofurantoin monohydrate macro 100 MG Caps  Commonly known as: Macrobid        Tadalafil 20 MG Tabs  Commonly known as: Cialis        tamsulosin 0.4 MG Caps  Commonly known as: Flomax                  Where to Get Your Medications        These medications were sent to [facility] [address] [facility] - [place], [place] - [address] [address] [phone], [phone]  [address] [facility] [place], [facility] [address]      Phone: [phone]   cefuroxime 500 MG Tabs  predniSONE 20 MG Tabs  umeclidinium bromide 62.5 MCG/ACT Aepb         Follow-up appointment:   Roberto Henderson MD  [address]  [facility] [id number] [phone]    Follow up in 3 day(s)      Anand Hlems IV, MD  [address]  [facility] [address] [phone]    Follow up in 1 week(s)      Appointments for Next 30 Days 5/7/2025 - 6/6/2025      None            OBJECTIVE:  Temp:  [97.4 °F (36.3 °C)-98.3 °F (36.8 °C)] 97.4 °F (36.3 °C)  Pulse:  [82-97] 82  Resp:  [16] 16  BP: (132-136)/(70-89) 136/76  SpO2:  [94 %-95 %] 94 %  Exam  Gen: No acute distress, alert and oriented  Pulm: Lungs clear bilaterally, normal respiratory effort, no crackles, no wheezing  CV: Heart with regular rate and rhythm, no murmur.   Abd: Abdomen soft, nontender, nondistended, bowel sounds present  MSK: no significant pitting edema or tenderness of the LE  Skin: no rashes or lesions  -----------------------------------------------------------------------------------------------  PATIENT DISCHARGE INSTRUCTIONS: See electronic chart    ASSESSMENT / PLAN:   80 year old male with history of asthma, COPD, GERD, hypertension, interstitial lung disease, osteoarthritis, osteopenia, pulmonary hypertension, sleep apnea using CPAP presenting with generalized weakness and shortness of breath.       COPD exacerbation  PNA   -iv steroids--> PO  -IV abx--> PO  -possible transition to po today pending pulm eval  -neb  -pulm following--> ok for dc     UTI  -iv abx  -cx  pending--> ngtd     Weakness  -secondary to to above  -PT/OT---> home with pT/oT     Asthma  COPD  Pulm HTN  -incruse  -Advair     Anxiety  Depression  -bupropion     GERD  -famotidine      BPH  -finasteride     MAL  -cpap     Plan of care discussed with patient and staff     Dispo: discharge     Justin Berg MD  [facility] Hospitalist  [phone]    Time spent:  > 35 minutes

## 2025-05-09 NOTE — PAYOR COMM NOTE
--------------  ADMISSION REVIEW     Payor: [employer]  Subscriber #:  [id number]  Authorization Number: [id number]    Admit date: 5/2/25  Admit time: 11:09 PM       REVIEW DOCUMENTATION:     ED Provider Notes        ED Provider Notes signed by Victor M Perez MD at 5/2/2025  9:35 PM       Author: Victor M Perez MD Service: -- Author Type: Physician    Filed: 5/2/2025  9:35 PM Date of Service: 5/2/2025  8:05 PM Status: Signed    : Victor M Perez MD (Physician)           Patient Seen in: [facility] Emergency Department      History     Chief Complaint   Patient presents with    Fatigue    Difficulty Breathing     Stated Complaint: hu, uti fatigue    Subjective:   HPI    Patient is a pleasant 80-year-old male with a history of COPD, past smoking, interstitial lung disease who wears oxygen or mentally at home who presents with multiple complaints.  Wife helps with his history.  He was in the hospital in March for pneumonia.  Wife says he has not completely recovered from that.  He is increasing weakness and some shortness of breath this week.  Today he slid to the floor and was unable to get off the ground secondary to weakness.  Had been seen at the immediate care for urinary symptoms and diagnosed with a UTI yesterday.  Increasing cough.  Satting 83% in triage and 90% on 3 L.  Denies chest pain.  No other specific complaints.  Patient is otherwise at his baseline.  Wife is at bedside and supportive.  Patient's echocardiogram from 5 2/24 reviewed.  Normal ejection fraction.  65 to 70%.  History of Present Illness               Objective:     Past Medical History:    Arthritis    Asthma (HCC)    Back problem    Cataract    COPD (chronic obstructive pulmonary disease) (HCC)    Degenerative disc disease, cervical    Eczema    Esophageal reflux    Essential hypertension    Hearing impairment    bilateral hearing aids    High blood pressure    Interstitial lung disease (HCC)    Muscle  weakness    Osteoarthritis    Osteopenia    Problems with swallowing    Pulmonary hypertension (HCC)    Scoliosis    Sleep apnea    cpap    Thoracic kyphosis    Visual impairment              Past Surgical History:   Procedure Laterality Date    Appendectomy      Hernia surgery      Hip replacement surgery Left     Repair ing hernia,5+y/o,reducibl      Spine surgery procedure unlisted  2019    lumbar fusion    Spine surgery procedure unlisted      LUMBAR SURGERY X 2       Physical Exam     ED Triage Vitals [05/02/25 1837]   /71   Pulse 102   Resp 18   Temp 99.6 °F (37.6 °C)   Temp src Temporal   SpO2 91 %   O2 Device Nasal cannula       Current Vitals:   Vital Signs  BP: 117/71  Pulse: 52  Resp: 26  Temp: 99.6 °F (37.6 °C)  Temp src: Temporal  MAP (mmHg): 86    Oxygen Therapy  SpO2: 99 %  O2 Device: Nasal cannula  O2 Flow Rate (L/min): 3 L/min        Physical Exam  General: Somewhat fatigued appearing 89-year-old male  HEENT: Normal cephalic atraumatic.  Nonicteric sclera.  Moist mucous membranes.  No meningismus.  No adenopathy  Lungs: Prolonged expiratory phase.  No focal rhonchi.  Some expiratory wheezing.  Cardiac: No tachycardia.  No murmurs.  Regular rate and rhythm.  Abdomen: Soft and nontender throughout.  No rebound or guarding  Extremities: Trace edema.  Skin: No rashes, no pallor  Neuro: Awake oriented ×3.  Nonfocal.  Good strength throughout  Physical Exam                ED Course     Labs Reviewed   COMP METABOLIC PANEL (14) - Abnormal; Notable for the following components:       Result Value    Glucose 130 (*)     Bilirubin, Total 1.7 (*)     All other components within normal limits   CBC WITH DIFFERENTIAL WITH PLATELET - Abnormal; Notable for the following components:    WBC 11.3 (*)     .0 (*)     .1 (*)     MCH 35.4 (*)     Neutrophil Absolute Prelim 10.19 (*)     Neutrophil Absolute 10.19 (*)     Lymphocyte Absolute 0.36 (*)     All other components within normal limits   PRO  BETA NATRIURETIC PEPTIDE - Abnormal; Notable for the following components:    Pro-Beta Natriuretic Peptide 693 (*)     All other components within normal limits   TROPONIN I HIGH SENSITIVITY - Normal   PROTHROMBIN TIME (PT) - Normal   PTT, ACTIVATED - Normal   LACTIC ACID, PLASMA - Normal   D-DIMER - Normal   SARS-COV-2/FLU A AND B/RSV BY PCR (GENEXPERT) - Normal    Narrative:     This test is intended for the qualitative detection and differentiation of SARS-CoV-2, influenza A, influenza B, and respiratory syncytial virus (RSV) viral RNA in nasopharyngeal or nares swabs from individuals suspected of respiratory viral infection consistent with COVID-19 by their healthcare provider. Signs and symptoms of respiratory viral infection due to SARS-CoV-2, influenza, and RSV can be similar.    Test performed using the Xpert Xpress SARS-CoV-2/FLU/RSV (real time RT-PCR)  assay on the [employer] instrument, [employer], [place] [address].   This test is being used under the Food and Drug Administration's Emergency Use Authorization.    The authorized Fact Sheet for Healthcare Providers for this assay is available upon request from the laboratory.   URINALYSIS WITH CULTURE REFLEX   BLOOD CULTURE   BLOOD CULTURE     EKG    Rate, intervals and axes as noted on EKG Report.  Rate: 99  Rhythm: Sinus Rhythm  Reading: Normal sinus rhythm with ventricular rate of 99 nonspecific ST-T wave changes.  Axis/intervals are noted.  Otherwise, agree with EKG report              Results            Chest x-ray: Improved aeration of lung base.  Otherwise negative.  Report reviewed     Lactic 1.1.  D-dimer negative.  Electrolytes noted.  .  Troponin 5.  White count 11.3 with platelet count of 147.  Somewhat of a left shift.   Troponin of 5.  BNP mildly elevated in the 600s.  White count 11.3.  Sodium 137.  Creatinine 0.97.  Bilirubin 1.7.       MDM      Patient presents with generalized weakness, cough, hypoxia.  Differential includes  pneumonia, PE, COPD exacerbation, urosepsis, other.  Is on Macrobid for a UTI.  Will send blood and urine cultures.  Will treat with an hour-long neb along with IV steroids for COPD exacerbation.  Will send D-dimer and consider CT if positive.  Will check chest x-ray to evaluate for pneumonia.  Does have a low-grade temp..  Will send COVID, flu.  Will reassess after blood work and imaging    Admission disposition: 5/2/2025  9:34 PM       Patient with urinary tract infection symptoms now weakness.  Cannot get off the ground.  History of COPD with hypoxia here.  Patient's chest x-ray without new pneumonia.  D-dimer negative making thromboembolic disease unlikely.  Was treated with an hour-long neb and Solu-Medrol here for COPD exasperation.  Maintained on oxygen.  Given his weakness inability get off the ground suspect sepsis from his UTI.  Urine reviewed from yesterday consistent with infection.  Will send for culture today along with blood cultures.  Will start Rocephin.  Will admit for further breathing treatments, care and treatment.    Medical Decision Making      Disposition and Plan     Clinical Impression:  1. COPD exacerbation (HCC)    2. Sepsis secondary to UTI (HCC)         Disposition:  Admit  5/2/2025  9:34 pm    Follow-up:  No follow-up provider specified.        Medications Prescribed:  Current Discharge Medication List          Supplementary Documentation:         Hospital Problems       Present on Admission  Date Reviewed: 3/21/2025          ICD-10-CM Noted POA    * (Principal) COPD exacerbation (HCC) J44.1 5/2/2025 Unknown    Hyperglycemia R73.9 5/2/2025 Yes                Sepsis dx documented at 5/2/2025  9:14 PM          Signed by Victor M Perez MD on 5/2/2025  9:35 PM     5/3    History of Present Illness: Pt is a 80 year old male consulted for dyspnea with h/o mild ILD, pulmonary HTN, asthma, rx'd home O2 which he uses at night and with his CPAP. Pt tells me that he had a pneumonia 3/2025 and  has not felt good since then. Noted weakness at that time and had felt a little better but has had some dyspnea and intermittent weakness and worsened yesterday when he slid out of a chair and could not get up. Also notes brain fog. Cough is better than before but is now dry. Otherwise pt denies cough, chest pain, nausea, vomitting or diarrhea.       PMH:  [Past Medical History]  [Past Surgical History]    Allergies: [Allergies]    Medications:  Outpatient Medications:  [Medications Ordered Prior to Encounter]    Inpatient Medications:  Scheduled Medications:  [Scheduled Medications]    [Past Medical History]   Arthritis    Asthma (HCC)    Back problem    Cataract    COPD (chronic obstructive pulmonary disease) (HCC)    Degenerative disc disease, cervical    Eczema    Esophageal reflux    Essential hypertension    Hearing impairment     bilateral hearing aids    High blood pressure    Interstitial lung disease (HCC)    Muscle weakness    Osteoarthritis    Osteopenia    Problems with swallowing    Pulmonary hypertension (HCC)    Scoliosis    Sleep apnea     cpap    Thoracic kyphosis    Visual impairment       [Past Surgical History]        Procedure Laterality Date    Appendectomy        Hernia surgery        Hip replacement surgery Left      Repair ing hernia,5+y/o,reducibl        Spine surgery procedure unlisted   2019     lumbar fusion    Spine surgery procedure unlisted         LUMBAR SURGERY X 2           [Medications Ordered Prior to Encounter]            Current Facility-Administered Medications on File Prior to Encounter   Medication Dose Route Frequency Provider Last Rate Last Admin    [COMPLETED] azithromycin (Zithromax) 500 mg in sodium chloride 0.9% 250mL IVPB premix  500 mg Intravenous Once Nabil Miles  mL/hr at 03/28/25 2307 500 mg at 03/28/25 2307    [COMPLETED] cefTRIAXone (Rocephin) 2 g in sodium chloride 0.9% 100 mL IVPB-ADDV  2 g Intravenous Once Nabil Miles MD   Stopped at  03/28/25 2300    [COMPLETED] azithromycin (Zithromax) tab 500 mg  500 mg Oral Daily Anand Helms IV, MD   500 mg at 03/30/25 2223             Current Outpatient Medications on File Prior to Encounter   Medication Sig Dispense Refill    nitrofurantoin monohydrate macro 100 MG Oral Cap Take 1 capsule (100 mg total) by mouth 2 (two) times daily.        tamsulosin 0.4 MG Oral Cap Take 1 capsule (0.4 mg total) by mouth in the morning.        finasteride 5 MG Oral Tab Take 1 tablet (5 mg total) by mouth daily. 90 tablet 6    Mirabegron ER (MYRBETRIQ) 50 MG Oral Tablet 24 Hr Take 1 tablet (50 mg total) by mouth daily. 90 each 5    Solifenacin Succinate 10 MG Oral Tab Take 1 tablet (10 mg total) by mouth daily. 90 tablet 5    buPROPion 75 MG Oral Tab Take 1 tablet (75 mg total) by mouth in the morning.        famotidine 20 MG Oral Tab Take 1 tablet (20 mg total) by mouth in the morning and 1 tablet (20 mg total) before bedtime.        gabapentin 400 MG Oral Cap Take 2 capsules (800 mg total) by mouth 2 (two) times daily.        Tadalafil (CIALIS) 20 MG Oral Tab Take 1 tablet (20 mg total) by mouth daily as needed for Erectile Dysfunction. 30 tablet 5    montelukast 10 MG Oral Tab Take 1 tablet (10 mg total) by mouth nightly. (Patient taking differently: Take 1 tablet (10 mg total) by mouth every morning.)        celecoxib 200 MG Oral Cap Take 1 capsule (200 mg total) by mouth in the morning and 1 capsule (200 mg total) before bedtime.        escitalopram 10 MG Oral Tab Take 1 tablet (10 mg total) by mouth in the morning.        polyethylene glycol, PEG 3350, 17 g Oral Powd Pack Take 17 g by mouth daily as needed.        cholecalciferol 50 MCG (2000 UT) Oral Cap Take 500 Units by mouth in the morning.        albuterol (2.5 MG/3ML) 0.083% Inhalation Nebu Soln Take 3 mL (2.5 mg total) by nebulization every 6 (six) hours as needed for Wheezing.        MULTIPLE VITAMIN OR Take 1 tablet by mouth in the morning.         fexofenadine 180 MG Oral Tab Take 1 tablet (180 mg total) by mouth daily as needed.           [Scheduled Medications]   buPROPion  75 mg Oral Daily    escitalopram  10 mg Oral Daily    cetirizine  10 mg Oral Daily    finasteride  5 mg Oral Daily    gabapentin  800 mg Oral BID    [Held by provider] Mirabegron ER  50 mg Oral Daily    montelukast  10 mg Oral QAM    oxybutynin ER  10 mg Oral Daily    tamsulosin  0.4 mg Oral Daily    famotidine  20 mg Oral Daily    methylPREDNISolone  40 mg Intravenous Q8H    ipratropium-albuterol  3 mL Nebulization q6h    cefTRIAXone  1 g Intravenous Q24H    heparin  5,000 Units Subcutaneous Q8H PENG     Infusing Medications:  [Medication Infusions]    [Medication Infusions]  PRN Medications:  [PRN Medications]          History   Drug Use Unknown     Work:Retired [employer].    TB: none  Asbestos: none    [Family History]    [Family History]  History reviewed. No pertinent family history.  REVIEW OF SYSTEMS:   A comprehensive 10 point review of systems was completed.  Pertinent positives and negatives noted in the the HPI.    OBJECTIVE:  Temp (24hrs), Av.6 °F (37 °C), Min:98.1 °F (36.7 °C), Max:99.6 °F (37.6 °C)   BP 99/50 (BP Location: Right arm)   Pulse 101   Temp 98.1 °F (36.7 °C) (Oral)   Resp 18   Wt 190 lb (86.2 kg)   SpO2 92%   BMI 30.67 kg/m²     Intake/Output Summary (Last 24 hours) at 5/3/2025 0915  Last data filed at 5/3/2025 0557      Gross per 24 hour   Intake 430 ml   Output 401 ml   Net 29 ml     O2: RA  General: NAD.   Neuro: Alert, no focal deficits.    HEENT: PERRL  Neck : No LAD  CV: RRR, nl S1, S2, no S4, S3 or murmur.   Lungs: Clear but decreased.   Abd: Nontender, non distended.   Ext: No edema.   Skin: No rashes.     Labs:       Recent Labs   Lab 25  0614   WBC 11.3* 11.0   HGB 13.7 12.8*   HCT 39.5 36.5*   .0* 139.0*           Recent Labs   Lab 25  0614   * 189*   BUN 17 14   CREATSERUM 0.97 0.95    CA 9.4 9.2    140   K 4.3 4.1    104   CO2 26.0 26.0   AST 32  --    ALT 13  --    ALB 4.4  --           Recent Labs   Lab 05/02/25 1918   INR 1.11   PTT 27.3      No results for input(s): \"ABGPHT\", \"SKVGJP5V\", \"EFKOP7M\", \"ABGHCO3\", \"SITE\", \"DEV\", \"THGB\" in the last 168 hours.      Recent Labs   Lab 05/02/25 1918   TROPHS 5      No results for input(s): \"BNP\" in the last 168 hours.  COVID-19 Lab Results     COVID-19        Lab Results   Component Value Date     COVID19 Not Detected 05/02/2025     COVID19 Not Detected 03/28/2025     COVID19 Not Detected 05/27/2022         Pro-Calcitonin  No results for input(s): \"PCT\" in the last 168 hours.     Cardiac      Recent Labs   Lab 05/02/25 1918   PBNP 693*         Creatinine Kinase  No results for input(s): \"CK\" in the last 168 hours.     Inflammatory Markers      Recent Labs   Lab 05/02/25 1918   DDIMER 0.76       Imaging:  CXR 5/2/25:  FINDINGS:  Lung volumes are satisfactory.  Decreasing opacities at the right lung base with minimal residual.  The right CP angle remains sharp.  Left base pleural parenchymal changes appear similar to the prior without improvement or worsening.  This  may be related to passive atelectasis due to an elevated left diaphragm.  Heart and pulmonary vessels appear stable, normal caliber.  Deformities from old right rib fractures.  Right humeral head bone anchors.      Impression   CONCLUSION:  Improved aeration to the right lung base.  Other findings appear stable.       Prior:   PFT 6/6/22, FEV1 1.92 liters (74%), ratio 69, TLC 89%, RV/%, DLCO 48%  PFT 8/24/23, FEV1 1.70 liters (72%), ratio 68, TLC 90%, RV/%, DLCO 49%  PFT 9/17/24, FEV1 1.65 liters (73%), ratio 69, TLC 93%, RV/%, DLCO 53%     CT Chest 2/11/23:  Lungs and large airways: There is scarring and atelectasis in both lung bases. Scattered calcified   granulomas are present. There is bronchial wall thickening bilaterally. Mild diffuse  emphysematous   changes are present. There is platelike atelectasis or scarring in the lower lungs, particularly in   the left lower lobe and lingula.     Pleura:  Normal. No pleural effusion or thickening.     Heart and pericardium:  There is mild prominence of the left atrium. Mitral valve annulus   calcifications are present..     Mediastinum and logan: There are multiple calcified lymph nodes, compatible with remote granulomatous   infection.     Chest wall and lower neck:  There is a spinal stimulator device noted.     Vessels:  Atherosclerotic changes in the aorta and coronary arteries.     Bones:  Multilevel degenerative changes are seen in the spine. There are also degenerative changes   noted at both sternoclavicular joints.     Upper abdomen: Calcified splenic granulomas are present. There are tiny hypodensities in the liver,   incompletely evaluated on this exam. There is mild elevation of the left hemidiaphragm.     Echo 5/2024:     1. Left ventricle: The cavity size was normal. Wall thickness was at the      upper limits of normal. Systolic function was normal. The estimated      ejection fraction was 65-70%, by visual assessment. No diagnostic      evidence for regional wall motion abnormalities. Left ventricular      diastolic function parameters were normal for the patient's age.   2. Left atrium: The left atrial volume was moderately increased.   3. Right atrium: The atrium was mildly dilated.   4. Pulmonary arteries: Systolic pressure was at the upper limits of normal      to, at most, mildly increased; in the range of 30 mm Hg to 35 mm Hg,      estimated to be 35mm Hg. The peak systolic pressure is 35mm Hg.   Impressions:  This study is compared with previous dated 03-14-23: No   significant change since prior study.   Assessment/Plan   Dyspnea secondary to pneumonia with h/o asthma, CPFE, pulmonary HTN>   O2 protocol and use with CPAP.   Steroids IV  Asthma, likely COPD with emphysematous changes,  ILD c/w CPFE with pseudonormalization of lung volumes.   - Steroids as above.   - Advair 250 in lieu of symbicort.   - Add incruse given worsening sx.   - Cosndier daliresp or azithro.   ID: Pneumonia  - Rocephin 5/2 started.   Deescalate pending culture.   Proph  Subcutaneous heparin.   My best regards,           Jeramy Granda MD  [facility], Critical [facility] Sleep [facility]    5/4 Pulmonary  Assessment/Plan   Dyspnea secondary to pneumonia with h/o asthma, CPFE, pulmonary HTN  O2 protocol and use with CPAP.   Steroids IV  Asthma, likely COPD with emphysematous changes, ILD c/w CPFE with pseudonormalization of lung volumes.   - Steroids as above.   - Advair 250 in lieu of symbicort.   - Add incruse given worsening sx.   - Cosndier daliresp or azithro.   ID: Pneumonia  - Rocephin 5/2 started.   Deescalate pending culture. NGTD and legionella negative.   Proph  Subcutaneous heparin.     5/4    SUBJECTIVE:no chest pain, palpitations, nausea, vomiting, abdominal pain. SOB improved. No cough.     OBJECTIVE:  Temp:  [97.9 °F (36.6 °C)-98.1 °F (36.7 °C)] 98.1 °F (36.7 °C)  Pulse:  [] 90  Resp:  [16-18] 16  BP: ()/(50-83) 105/57  SpO2:  [90 %-96 %] 96 %  Exam  Gen: No acute distress, alert and oriented  Pulm: Lungs clear bilaterally, normal respiratory effort, no crackles, no wheezing  CV: Heart with regular rate and rhythm, no murmur.   Abd: Abdomen soft, nontender, nondistended, bowel sounds present  MSK: no significant pitting edema or tenderness of the LE  Skin: no rashes or lesions     Labs:         Recent Labs   Lab 05/02/25 1918 05/03/25  0614 05/04/25  0621   WBC 11.3* 11.0 13.5*   HGB 13.7 12.8* 12.7*   .1* 102.5* 103.1*   .0* 139.0* 171.0   INR 1.11  --   --                Recent Labs   Lab 05/02/25 1918 05/03/25  0614 05/04/25  0621    140 142   K 4.3 4.1 4.8    104 104   CO2 26.0 26.0 27.0   BUN 17 14 19   CREATSERUM 0.97 0.95 1.02   CA 9.4 9.2 9.7   MG   --  1.9 2.2   * 189* 147*             Recent Labs   Lab 05/02/25  1918   ALT 13   AST 32   ALB 4.4         No results for input(s): \"PGLU\" in the last 168 hours.     Meds:   Scheduled: [Scheduled Medications]    [Scheduled Medications]   buPROPion  75 mg Oral Daily    cetirizine  10 mg Oral Daily    finasteride  5 mg Oral Daily    gabapentin  800 mg Oral BID    [Held by provider] Mirabegron ER  50 mg Oral Daily    montelukast  10 mg Oral QAM    oxybutynin ER  10 mg Oral Daily    famotidine  20 mg Oral Daily    fluticasone-salmeterol  1 puff Inhalation BID    umeclidinium bromide  1 puff Inhalation Daily    cefTRIAXone  2 g Intravenous Q24H    methylPREDNISolone  40 mg Intravenous Q8H    ipratropium-albuterol  3 mL Nebulization q6h    heparin  5,000 Units Subcutaneous Q8H PENG     Continuous Infusions: [Medication Infusions]    [Medication Infusions]  PRN: [PRN Medications]    [PRN Medications]    acetaminophen    polyethylene glycol (PEG 3350)    sennosides    bisacodyl    fleet enema    ondansetron    metoclopramide    benzonatate    guaiFENesin  ASSESSMENT / PLAN:   80 year old male with history of asthma, COPD, GERD, hypertension, interstitial lung disease, osteoarthritis, osteopenia, pulmonary hypertension, sleep apnea using CPAP presenting with generalized weakness and shortness of breath.       COPD exacerbation   -iv steroids  -neb  -pulm following     UTI  -iv abx  -cx pending     Weakness  -secondary to to above  -PT/OT---> home with pT/oT     Asthma  COPD  Pulm HTN  -incruse  -Advair     Anxiety  Depression  -bupropion     GERD  -famotidine      BPH  -finasteride     MAL  -cpap     Quality:  DVT Prophylaxis: scd, heparin sq  CODE status:   Thakur: none     Plan of care discussed with patient and staff        Justin Berg MD  [facility]  [phone]                  Electronically signed by Justin Berg MD at 5/4/2025  7:03 AM

## 2025-05-09 NOTE — PAYOR COMM NOTE
--------------  DISCHARGE REVIEW    Payor: [employer]  Subscriber #:  [id number]  Authorization Number: [id number]    Admit date: 25  Admit time:  11:09 PM  Discharge Date: 2025  4:51 PM     Admitting Physician: Darrick Treviño MD  Attending Physician:  No att. providers found  Primary Care Physician: Roberto Henderson MD          Discharge Summary Notes        Discharge Summary signed by Justin Berg MD at 2025  6:16 AM       Author: Justin Berg MD Specialty: Internal Medicine, HOSPITALIST Author Type: Physician    Filed: 2025  6:16 AM Date of Service: 2025  9:13 PM Status: Signed    : Justin Berg MD (Physician)           [facility]   part of [facility]    DISCHARGE SUMMARY     Duke Garcia III Patient Status:  Inpatient    1945 MRN [id number]   Location [facility] 5NW-A Attending No att. providers found   Hosp Day # 3 PCP Roberto Henderson MD     Date of Admission: 2025  Date of Discharge: 2025  Discharge Disposition: Home or Self Care    Discharge Diagnosis: COPD exacerbation, pneumonia, UTI    History of Present Illness: 80 year old male with history of asthma, COPD, GERD, hypertension, interstitial lung disease, osteoarthritis, osteopenia, pulmonary hypertension, sleep apnea using CPAP presenting with generalized weakness and shortness of breath.  Patient was seen in consultation with pulmonary.  Patient was started on IV antibiotics as well as IV steroids for COPD exacerbation and pneumonia.  Patient's UA was abnormal.  Culture did come back negative.  Patient already on antibiotics for pneumonia.  Patient clinically improved from respiratory standpoint.  Patient was cleared by pulmonary for discharge on oral steroids and oral antibiotics.  Patient clinically stable, vital stable, labs stable for discharge.  Patient and family agreeable discharge.    Discharge Medication List:     Discharge Medications        START taking  these medications        Instructions Prescription details   cefuroxime 500 MG Tabs  Commonly known as: Ceftin      Take 1 tablet (500 mg total) by mouth 2 (two) times daily for 4 days.   Stop taking on: May 9, 2025  Quantity: 8 tablet  Refills: 0     predniSONE 20 MG Tabs  Commonly known as: Deltasone      Take 2 tabs daily x5 days, then one tab daily x5 days   Quantity: 15 tablet  Refills: 0     umeclidinium bromide 62.5 MCG/ACT Aepb  Commonly known as: Incruse Ellipta      Inhale 1 puff into the lungs daily.   Quantity: 1 each  Refills: 0            CHANGE how you take these medications        Instructions Prescription details   montelukast 10 MG Tabs  Commonly known as: Singulair  What changed: when to take this      Take 1 tablet (10 mg total) by mouth nightly.   Refills: 0            CONTINUE taking these medications        Instructions Prescription details   albuterol (2.5 MG/3ML) 0.083% Nebu  Commonly known as: Ventolin      Take 3 mL (2.5 mg total) by nebulization every 6 (six) hours as needed for Wheezing.   Refills: 0     buPROPion 75 MG Tabs  Commonly known as: Wellbutrin      Take 1 tablet (75 mg total) by mouth in the morning.   Refills: 0     celecoxib 200 MG Caps  Commonly known as: CeleBREX      Take 1 capsule (200 mg total) by mouth in the morning and 1 capsule (200 mg total) before bedtime.   Refills: 0     cholecalciferol 50 MCG (2000 UT) Caps  Commonly known as: Vitamin D3      Take 500 Units by mouth in the morning.   Refills: 0     famotidine 20 MG Tabs  Commonly known as: Pepcid      Take 1 tablet (20 mg total) by mouth in the morning and 1 tablet (20 mg total) before bedtime.   Refills: 0     fexofenadine 180 MG Tabs  Commonly known as: Allegra      Take 1 tablet (180 mg total) by mouth daily as needed.   Refills: 0     finasteride 5 MG Tabs  Commonly known as: Proscar      Take 1 tablet (5 mg total) by mouth daily.   Quantity: 90 tablet  Refills: 6     gabapentin 400 MG Caps  Commonly known  as: Neurontin      Take 2 capsules (800 mg total) by mouth 2 (two) times daily.   Refills: 0     Mirabegron ER 50 MG Tb24  Commonly known as: Myrbetriq      Take 1 tablet (50 mg total) by mouth daily.   Quantity: 90 each  Refills: 5     MULTIPLE VITAMIN OR      Take 1 tablet by mouth in the morning.   Refills: 0     polyethylene glycol (PEG 3350) 17 g Pack  Commonly known as: Miralax      Take 17 g by mouth daily as needed.   Refills: 0     Solifenacin Succinate 10 MG Tabs  Commonly known as: VESICARE      Take 1 tablet (10 mg total) by mouth daily.   Quantity: 90 tablet  Refills: 5            STOP taking these medications      escitalopram 10 MG Tabs  Commonly known as: Lexapro        nitrofurantoin monohydrate macro 100 MG Caps  Commonly known as: Macrobid        Tadalafil 20 MG Tabs  Commonly known as: Cialis        tamsulosin 0.4 MG Caps  Commonly known as: Flomax                  Where to Get Your Medications        These medications were sent to [place] [address] [place] [facility] [address] [facility] [phone], [phone]  [address] [place] [address]      Phone: [phone]   cefuroxime 500 MG Tabs  predniSONE 20 MG Tabs  umeclidinium bromide 62.5 MCG/ACT Aepb         Follow-up appointment:   Roberto Henderson MD  [address] [facility] [address]  [facility] [address] [phone]    Follow up in 3 day(s)      Anand Helms IV, MD  [address] [facility]  [facility] [address]  [facility] [address] [phone]    Follow up in 1 week(s)      Appointments for Next 30 Days 5/7/2025 - 6/6/2025      None            OBJECTIVE:  Temp:  [97.4 °F (36.3 °C)-98.3 °F (36.8 °C)] 97.4 °F (36.3 °C)  Pulse:  [82-97] 82  Resp:  [16] 16  BP: (132-136)/(70-89) 136/76  SpO2:  [94 %-95 %] 94 %  Exam  Gen: No acute distress, alert and oriented  Pulm: Lungs clear bilaterally, normal respiratory effort, no crackles, no wheezing  CV: Heart with regular rate and rhythm, no murmur.   Abd: Abdomen soft, nontender, nondistended, bowel  sounds present  MSK: no significant pitting edema or tenderness of the LE  Skin: no rashes or lesions  -----------------------------------------------------------------------------------------------  PATIENT DISCHARGE INSTRUCTIONS: See electronic chart    ASSESSMENT / PLAN:   80 year old male with history of asthma, COPD, GERD, hypertension, interstitial lung disease, osteoarthritis, osteopenia, pulmonary hypertension, sleep apnea using CPAP presenting with generalized weakness and shortness of breath.       COPD exacerbation  PNA   -iv steroids--> PO  -IV abx--> PO  -possible transition to po today pending pulm eval  -neb  -pulm following--> ok for dc     UTI  -iv abx  -cx pending--> ngtd     Weakness  -secondary to to above  -PT/OT---> home with pT/oT     Asthma  COPD  Pulm HTN  -incruse  -Advair     Anxiety  Depression  -bupropion     GERD  -famotidine      BPH  -finasteride     MAL  -cpap     Plan of care discussed with patient and staff     Dispo: discharge     Justin Berg MD  [facility]  [phone]    Time spent:  > 35 minutes      Electronically signed by Justin Berg MD on 5/7/2025  6:16 AM

## 2025-05-15 ENCOUNTER — ORDER TRANSCRIPTION (OUTPATIENT)
Dept: ADMINISTRATIVE | Facility: HOSPITAL | Age: 80
End: 2025-05-15

## 2025-05-15 DIAGNOSIS — Z13.9 SCREENING FOR CONDITION: Primary | ICD-10-CM

## 2025-05-28 ENCOUNTER — HOSPITAL ENCOUNTER (OUTPATIENT)
Dept: CT IMAGING | Age: 80
Discharge: HOME | End: 2025-05-28
Attending: INTERNAL MEDICINE

## 2025-05-28 ENCOUNTER — HOSPITAL ENCOUNTER (OUTPATIENT)
Dept: ULTRASOUND IMAGING | Age: 80
Discharge: HOME | End: 2025-05-28
Attending: INTERNAL MEDICINE

## 2025-05-28 DIAGNOSIS — Z13.6 SCREENING FOR HEART DISEASE: ICD-10-CM

## 2025-06-09 ENCOUNTER — ORDER TRANSCRIPTION (OUTPATIENT)
Age: 80
End: 2025-06-09

## 2025-06-09 DIAGNOSIS — R09.89 PULMONARY HYPERINFLATION: Primary | ICD-10-CM

## 2025-07-08 ENCOUNTER — ORDER TRANSCRIPTION (OUTPATIENT)
Age: 80
End: 2025-07-08

## 2025-07-08 DIAGNOSIS — I27.20 PULMONARY HYPERTENSION (HCC): Primary | ICD-10-CM

## 2025-07-09 ENCOUNTER — APPOINTMENT (OUTPATIENT)
Age: 80
End: 2025-07-09
Attending: INTERNAL MEDICINE

## 2025-07-28 ENCOUNTER — CARDPULM VISIT (OUTPATIENT)
Age: 80
End: 2025-07-28
Attending: INTERNAL MEDICINE
Payer: MEDICARE

## 2025-07-29 ENCOUNTER — CARDPULM VISIT (OUTPATIENT)
Age: 80
End: 2025-07-29
Attending: INTERNAL MEDICINE
Payer: MEDICARE

## 2025-07-31 ENCOUNTER — APPOINTMENT (OUTPATIENT)
Age: 80
End: 2025-07-31
Attending: INTERNAL MEDICINE

## 2025-07-31 ENCOUNTER — APPOINTMENT (OUTPATIENT)
Facility: HOSPITAL | Age: 80
End: 2025-07-31
Attending: INTERNAL MEDICINE

## 2025-07-31 ENCOUNTER — CARDPULM VISIT (OUTPATIENT)
Age: 80
End: 2025-07-31
Attending: INTERNAL MEDICINE
Payer: MEDICARE

## 2025-07-31 PROCEDURE — 93798 PHYS/QHP OP CAR RHAB W/ECG: CPT

## 2025-08-05 ENCOUNTER — CARDPULM VISIT (OUTPATIENT)
Facility: HOSPITAL | Age: 80
End: 2025-08-05
Attending: INTERNAL MEDICINE

## 2025-08-07 ENCOUNTER — APPOINTMENT (OUTPATIENT)
Facility: HOSPITAL | Age: 80
End: 2025-08-07
Attending: INTERNAL MEDICINE

## 2025-08-12 ENCOUNTER — CARDPULM VISIT (OUTPATIENT)
Facility: HOSPITAL | Age: 80
End: 2025-08-12
Attending: INTERNAL MEDICINE

## 2025-08-14 ENCOUNTER — CARDPULM VISIT (OUTPATIENT)
Facility: HOSPITAL | Age: 80
End: 2025-08-14
Attending: INTERNAL MEDICINE

## 2025-08-19 ENCOUNTER — CARDPULM VISIT (OUTPATIENT)
Facility: HOSPITAL | Age: 80
End: 2025-08-19
Attending: INTERNAL MEDICINE

## 2025-08-21 ENCOUNTER — CARDPULM VISIT (OUTPATIENT)
Facility: HOSPITAL | Age: 80
End: 2025-08-21
Attending: INTERNAL MEDICINE

## 2025-08-26 ENCOUNTER — CARDPULM VISIT (OUTPATIENT)
Facility: HOSPITAL | Age: 80
End: 2025-08-26
Attending: INTERNAL MEDICINE

## 2025-08-28 ENCOUNTER — CARDPULM VISIT (OUTPATIENT)
Facility: HOSPITAL | Age: 80
End: 2025-08-28
Attending: INTERNAL MEDICINE

## 2025-10-02 ENCOUNTER — APPOINTMENT (OUTPATIENT)
Facility: HOSPITAL | Age: 80
End: 2025-10-02
Attending: INTERNAL MEDICINE

## 2025-10-07 ENCOUNTER — APPOINTMENT (OUTPATIENT)
Facility: HOSPITAL | Age: 80
End: 2025-10-07
Attending: INTERNAL MEDICINE

## 2025-10-09 ENCOUNTER — APPOINTMENT (OUTPATIENT)
Facility: HOSPITAL | Age: 80
End: 2025-10-09
Attending: INTERNAL MEDICINE

## 2025-10-14 ENCOUNTER — APPOINTMENT (OUTPATIENT)
Facility: HOSPITAL | Age: 80
End: 2025-10-14
Attending: INTERNAL MEDICINE

## 2025-10-16 ENCOUNTER — APPOINTMENT (OUTPATIENT)
Facility: HOSPITAL | Age: 80
End: 2025-10-16
Attending: INTERNAL MEDICINE

## 2025-10-21 ENCOUNTER — APPOINTMENT (OUTPATIENT)
Facility: HOSPITAL | Age: 80
End: 2025-10-21
Attending: INTERNAL MEDICINE

## 2025-10-23 ENCOUNTER — APPOINTMENT (OUTPATIENT)
Facility: HOSPITAL | Age: 80
End: 2025-10-23
Attending: INTERNAL MEDICINE

## 2025-10-28 ENCOUNTER — APPOINTMENT (OUTPATIENT)
Facility: HOSPITAL | Age: 80
End: 2025-10-28
Attending: INTERNAL MEDICINE

## 2025-10-30 ENCOUNTER — APPOINTMENT (OUTPATIENT)
Facility: HOSPITAL | Age: 80
End: 2025-10-30
Attending: INTERNAL MEDICINE

## (undated) DEVICE — SLEEVE KENDALL SCD EXPRESS MED

## (undated) DEVICE — BITEBLOCK ENDOSCP 60FR MAXI STRP

## (undated) DEVICE — 1200CC GUARDIAN II: Brand: GUARDIAN

## (undated) DEVICE — STERILE POLYISOPRENE POWDER-FREE SURGICAL GLOVES: Brand: PROTEXIS

## (undated) DEVICE — DEVICE INFL 60ML 15ATM PRSS COOK SPHR

## (undated) DEVICE — SPLINT PRECUT ORTHOGLASS 3X12

## (undated) DEVICE — GIJAW SINGLE-USE BIOPSY FORCEPS WITH NEEDLE: Brand: GIJAW

## (undated) DEVICE — OCCLUSIVE GAUZE STRIP OVERWRAP,3% BISMUTH TRIBROMOPHENATE IN PETROLATUM BLEND: Brand: XEROFORM

## (undated) DEVICE — STERIS KITS

## (undated) DEVICE — GOWN,SIRUS,FABRIC-REINFORCED,X-LARGE: Brand: MEDLINE

## (undated) DEVICE — SUT ETHILON 4-0 PS-2 1667H

## (undated) DEVICE — UPPER EXTREMITY CDS-LF: Brand: MEDLINE INDUSTRIES, INC.

## (undated) DEVICE — STERILE SYNTHETIC POLYISOPRENE POWDER-FREE SURGICAL GLOVES WITH HYDROGEL COATING: Brand: PROTEXIS

## (undated) DEVICE — HERCULES 3 STAGE BALLOON ESOPHAGEAL: Brand: HERCULES

## (undated) DEVICE — SUT VICRYL 3-0 SH J416H

## (undated) DEVICE — KIT VLV 5 PC AIR H2O SUCT BX ENDOGATOR CONN

## (undated) DEVICE — DISPOSABLE TOURNIQUET CUFF SINGLE BLADDER, DUAL PORT AND QUICK CONNECT CONNECTOR: Brand: COLOR CUFF

## (undated) DEVICE — SOLUTION  .9 1000ML BTL

## (undated) DEVICE — 3M™ RED DOT™ MONITORING ELECTRODE WITH FOAM TAPE AND STICKY GEL, 50/BAG, 20/CASE, 72/PLT 2570: Brand: RED DOT™

## (undated) DEVICE — PADDING CAST SOFT ROLL 3IN

## (undated) DEVICE — 10FT COMBINED O2 DELIVERY/CO2 MONITORING. FILTER WITH MICROSTREAM TYPE LUER: Brand: DUAL ADULT NASAL CANNULA

## (undated) NOTE — Clinical Note
FYI: TCM completed. Patient has New pt appt scheduled on 6/6/22. TE to PCP office re: changing visit type and condition update. Thank you.